# Patient Record
Sex: FEMALE | Race: WHITE | Employment: OTHER
[De-identification: names, ages, dates, MRNs, and addresses within clinical notes are randomized per-mention and may not be internally consistent; named-entity substitution may affect disease eponyms.]

---

## 2023-03-13 ENCOUNTER — OFFICE VISIT (OUTPATIENT)
Dept: INTERNAL MEDICINE CLINIC | Facility: CLINIC | Age: 83
End: 2023-03-13
Payer: COMMERCIAL

## 2023-03-13 VITALS
DIASTOLIC BLOOD PRESSURE: 70 MMHG | BODY MASS INDEX: 39.54 KG/M2 | HEIGHT: 60 IN | SYSTOLIC BLOOD PRESSURE: 130 MMHG | WEIGHT: 201.4 LBS | HEART RATE: 63 BPM

## 2023-03-13 DIAGNOSIS — K21.9 GASTROESOPHAGEAL REFLUX DISEASE, UNSPECIFIED WHETHER ESOPHAGITIS PRESENT: ICD-10-CM

## 2023-03-13 DIAGNOSIS — M81.0 AGE-RELATED OSTEOPOROSIS WITHOUT CURRENT PATHOLOGICAL FRACTURE: ICD-10-CM

## 2023-03-13 DIAGNOSIS — K29.40 CHRONIC EROSIVE GASTRITIS: ICD-10-CM

## 2023-03-13 DIAGNOSIS — G25.81 RLS (RESTLESS LEGS SYNDROME): ICD-10-CM

## 2023-03-13 DIAGNOSIS — I10 HYPERTENSION, ESSENTIAL: ICD-10-CM

## 2023-03-13 DIAGNOSIS — I35.0 AORTIC VALVE STENOSIS, ETIOLOGY OF CARDIAC VALVE DISEASE UNSPECIFIED: ICD-10-CM

## 2023-03-13 DIAGNOSIS — M15.9 PRIMARY OSTEOARTHRITIS INVOLVING MULTIPLE JOINTS: ICD-10-CM

## 2023-03-13 DIAGNOSIS — K63.5 POLYP OF COLON, UNSPECIFIED PART OF COLON, UNSPECIFIED TYPE: ICD-10-CM

## 2023-03-13 DIAGNOSIS — F33.42 RECURRENT MAJOR DEPRESSIVE DISORDER, IN FULL REMISSION (HCC): ICD-10-CM

## 2023-03-13 DIAGNOSIS — J45.51 SEVERE PERSISTENT ASTHMA WITH ACUTE EXACERBATION: Primary | ICD-10-CM

## 2023-03-13 DIAGNOSIS — E66.01 SEVERE OBESITY (HCC): ICD-10-CM

## 2023-03-13 DIAGNOSIS — E03.9 HYPOTHYROIDISM (ACQUIRED): ICD-10-CM

## 2023-03-13 DIAGNOSIS — D50.0 IRON DEFICIENCY ANEMIA DUE TO CHRONIC BLOOD LOSS: ICD-10-CM

## 2023-03-13 PROBLEM — D50.9 IRON DEFICIENCY ANEMIA: Status: ACTIVE | Noted: 2023-03-13

## 2023-03-13 PROBLEM — M19.90 OSTEOARTHRITIS: Status: ACTIVE | Noted: 2023-03-13

## 2023-03-13 PROBLEM — F32.A DEPRESSION: Status: ACTIVE | Noted: 2023-03-13

## 2023-03-13 PROBLEM — M15.0 PRIMARY OSTEOARTHRITIS INVOLVING MULTIPLE JOINTS: Status: ACTIVE | Noted: 2023-03-13

## 2023-03-13 PROBLEM — J45.909 ASTHMA: Status: ACTIVE | Noted: 2023-03-13

## 2023-03-13 PROCEDURE — 99205 OFFICE O/P NEW HI 60 MIN: CPT | Performed by: INTERNAL MEDICINE

## 2023-03-13 PROCEDURE — 3075F SYST BP GE 130 - 139MM HG: CPT | Performed by: INTERNAL MEDICINE

## 2023-03-13 PROCEDURE — 3078F DIAST BP <80 MM HG: CPT | Performed by: INTERNAL MEDICINE

## 2023-03-13 PROCEDURE — 1123F ACP DISCUSS/DSCN MKR DOCD: CPT | Performed by: INTERNAL MEDICINE

## 2023-03-13 RX ORDER — LISINOPRIL AND HYDROCHLOROTHIAZIDE 20; 12.5 MG/1; MG/1
1 TABLET ORAL DAILY
COMMUNITY
End: 2023-03-13 | Stop reason: SDUPTHER

## 2023-03-13 RX ORDER — OMEPRAZOLE 40 MG/1
40 CAPSULE, DELAYED RELEASE ORAL DAILY
Qty: 90 CAPSULE | Refills: 3 | Status: SHIPPED | OUTPATIENT
Start: 2023-03-13

## 2023-03-13 RX ORDER — CITALOPRAM 40 MG/1
40 TABLET ORAL DAILY
COMMUNITY
End: 2023-03-13 | Stop reason: SDUPTHER

## 2023-03-13 RX ORDER — LEVOTHYROXINE SODIUM 137 UG/1
137 TABLET ORAL DAILY
Qty: 90 TABLET | Refills: 1 | Status: SHIPPED | OUTPATIENT
Start: 2023-03-13

## 2023-03-13 RX ORDER — FLUTICASONE PROPIONATE AND SALMETEROL 500; 50 UG/1; UG/1
1 POWDER RESPIRATORY (INHALATION) EVERY 12 HOURS
COMMUNITY
End: 2023-03-13

## 2023-03-13 RX ORDER — LEVOTHYROXINE SODIUM 137 UG/1
137 TABLET ORAL DAILY
COMMUNITY
End: 2023-03-13 | Stop reason: SDUPTHER

## 2023-03-13 RX ORDER — GABAPENTIN 100 MG/1
CAPSULE ORAL
COMMUNITY
Start: 2023-02-22 | End: 2023-03-13

## 2023-03-13 RX ORDER — PREDNISONE 20 MG/1
TABLET ORAL
Qty: 18 TABLET | Refills: 1 | Status: SHIPPED | OUTPATIENT
Start: 2023-03-13

## 2023-03-13 RX ORDER — PREDNISONE 20 MG/1
20 TABLET ORAL DAILY PRN
COMMUNITY
End: 2023-03-13

## 2023-03-13 RX ORDER — SOFT LENS DISINFECTANT
SOLUTION, NON-ORAL MISCELLANEOUS
COMMUNITY

## 2023-03-13 RX ORDER — OMEPRAZOLE 40 MG/1
40 CAPSULE, DELAYED RELEASE ORAL DAILY
COMMUNITY
End: 2023-03-13 | Stop reason: SDUPTHER

## 2023-03-13 RX ORDER — CITALOPRAM 40 MG/1
40 TABLET ORAL DAILY
Qty: 90 TABLET | Refills: 1 | Status: SHIPPED | OUTPATIENT
Start: 2023-03-13

## 2023-03-13 RX ORDER — GABAPENTIN 300 MG/1
CAPSULE ORAL
Qty: 60 CAPSULE | Refills: 2 | Status: SHIPPED | OUTPATIENT
Start: 2023-03-13 | End: 2024-03-13

## 2023-03-13 RX ORDER — PRAMIPEXOLE DIHYDROCHLORIDE 0.75 MG/1
0.75 TABLET ORAL NIGHTLY
COMMUNITY
End: 2023-03-13

## 2023-03-13 RX ORDER — LISINOPRIL AND HYDROCHLOROTHIAZIDE 20; 12.5 MG/1; MG/1
1 TABLET ORAL DAILY
Qty: 90 TABLET | Refills: 1 | Status: SHIPPED | OUTPATIENT
Start: 2023-03-13

## 2023-03-13 SDOH — ECONOMIC STABILITY: INCOME INSECURITY: HOW HARD IS IT FOR YOU TO PAY FOR THE VERY BASICS LIKE FOOD, HOUSING, MEDICAL CARE, AND HEATING?: NOT HARD AT ALL

## 2023-03-13 SDOH — ECONOMIC STABILITY: FOOD INSECURITY: WITHIN THE PAST 12 MONTHS, THE FOOD YOU BOUGHT JUST DIDN'T LAST AND YOU DIDN'T HAVE MONEY TO GET MORE.: NEVER TRUE

## 2023-03-13 SDOH — ECONOMIC STABILITY: HOUSING INSECURITY
IN THE LAST 12 MONTHS, WAS THERE A TIME WHEN YOU DID NOT HAVE A STEADY PLACE TO SLEEP OR SLEPT IN A SHELTER (INCLUDING NOW)?: NO

## 2023-03-13 SDOH — ECONOMIC STABILITY: FOOD INSECURITY: WITHIN THE PAST 12 MONTHS, YOU WORRIED THAT YOUR FOOD WOULD RUN OUT BEFORE YOU GOT MONEY TO BUY MORE.: NEVER TRUE

## 2023-03-13 ASSESSMENT — PATIENT HEALTH QUESTIONNAIRE - PHQ9
SUM OF ALL RESPONSES TO PHQ9 QUESTIONS 1 & 2: 2
SUM OF ALL RESPONSES TO PHQ QUESTIONS 1-9: 2
1. LITTLE INTEREST OR PLEASURE IN DOING THINGS: 0
2. FEELING DOWN, DEPRESSED OR HOPELESS: 2
SUM OF ALL RESPONSES TO PHQ QUESTIONS 1-9: 2

## 2023-03-13 ASSESSMENT — ENCOUNTER SYMPTOMS
VOICE CHANGE: 0
RECTAL PAIN: 0
EYE PAIN: 0
STRIDOR: 0

## 2023-03-13 NOTE — PROGRESS NOTES
FOLLOWUP VISIT    Subjective:    Ms. Emmanuel Bragg is a [de-identified] y.o., female,   Chief Complaint   Patient presents with    [facility]    Asthma       HPI:    Ms. Emmanuel Bragg is a [de-identified] y.o., female who presents today for a new patient appointment. Their previous primary provider was Dr. Van Preston in [place]. Old records will be requested. The patient has severe persistent asthma. Triggers include upper respiratory illnesses and pollen and exposure to dogs and cats. Despite her current Qvar and frequent albuterol she is experiencing wheezing. Her former primary care physician gave her fairly large dose prednisone to use on an as-needed basis tapered over 10 to 14 days. She has not seen an asthma specialist.    She also has had chronic or recurrent iron deficiency anemia felt secondary to chronic GI blood loss due to chronic erosive gastritis. She has had both upper and lower endoscopies performed in [place]. A 2020 colonoscopy revealed 1 small tubular adenoma which was removed. She did not have a small bowel capsule endoscopy. She states she is intolerant of oral iron and instead receives IV iron infusions as needed. Problems as outlined below. The patient has hypertension. The patient has been on an attempted low sodium diet and has been trying to exercise and maintain a healthy weight. The patient reports good compliance with the blood pressure medications. The patient has GERD. The patient has been on attempted therapeutic lifestyle change including smaller more frequent meals and avoiding caffeine. The patient states that the GERD symptoms have been well controlled on current treatment and denies any dysphagia. The patient has hypothyroidism. The patient denies any symptoms of hypo- or hyperthyroidism on the current dose of levothyroxine. Her depression is controlled on current citalopram.      She has restless leg syndrome. Mirapex did not help.   Gabapentin 100 mg at bedtime did not help.      She reports a history of iron deficiency. Cannot tolerate oral iron. Has received IV iron infusions in the past.      Other chronic problems as outlined below. The following portions of the patient's history were reviewed and updated as appropriate:      Past Medical History:   Diagnosis Date    Aortic stenosis     Asthma     Chronic erosive gastritis     Colon polyps     Depression     GERD (gastroesophageal reflux disease)     Hypertension     Hypothyroidism     Iron deficiency anemia     Osteoarthritis     Osteoporosis     RLS (restless legs syndrome)     Severe obesity (HCC)        Past Surgical History:   Procedure Laterality Date    CATARACT EXTRACTION, BILATERAL      CHOLECYSTECTOMY      SHOULDER ARTHROPLASTY      UMBILICAL HERNIA REPAIR         Family History   Problem Relation Age of Onset    Alcohol Abuse Mother     Heart Disease Father        Social History     Socioeconomic History    Marital status:      Spouse name: Not on file    Number of children: Not on file    Years of education: Not on file    Highest education level: Not on file   Occupational History     Comment: retired realtor   Tobacco Use    Smoking status: Former     Packs/day: 0.50     Years: 4.00     Pack years: 2.00     Types: Cigarettes     Quit date: 1965     Years since quittin.2    Smokeless tobacco: Never   Substance and Sexual Activity    Alcohol use: Yes     Comment: occasional social    Drug use: Never    Sexual activity: Not on file   Other Topics Concern    Not on file   Social History Narrative    Not on file     Social Determinants of Health     Financial Resource Strain: Low Risk     Difficulty of Paying Living Expenses: Not hard at all   Food Insecurity: No Food Insecurity    Worried About Running Out of Food in the Last Year: Never true    Ran Out of Food in the Last Year: Never true   Transportation Needs: Unknown    Lack of Transportation (Medical):  Not on file    Lack of Transportation (Non-Medical): No   Physical Activity: Not on file   Stress: Not on file   Social Connections: Not on file   Intimate Partner Violence: Not on file   Housing Stability: Unknown    Unable to Pay for Housing in the Last Year: Not on file    Number of Places Lived in the Last Year: Not on file    Unstable Housing in the Last Year: No       Current Outpatient Medications   Medication Sig Dispense Refill    albuterol sulfate (PROAIR RESPICLICK) 796 (90 Base) MCG/ACT aerosol powder inhalation Inhale 2 puffs into the lungs every 4 hours as needed for Wheezing or Shortness of Breath      beclomethasone (QVAR REDIHALER) 80 MCG/ACT AERB inhaler Inhale 1 puff into the lungs 2 times daily      omeprazole (PRILOSEC) 40 MG delayed release capsule Take 40 mg by mouth daily      citalopram (CELEXA) 40 MG tablet Take 40 mg by mouth daily      lisinopril-hydroCHLOROthiazide (PRINZIDE;ZESTORETIC) 20-12.5 MG per tablet Take 1 tablet by mouth daily      levothyroxine (SYNTHROID) 137 MCG tablet Take 137 mcg by mouth Daily      Respiratory Therapy Supplies (NEBULIZER) HOMER by Does not apply route      Calcium Carbonate-Vitamin D (CALTRATE 600+D PO) Take by mouth 2 times daily                     No current facility-administered medications for this visit. Allergies as of 03/13/2023    (No Known Allergies)       Review of Systems   Constitutional:  Negative for activity change and appetite change. HENT:  Negative for drooling and voice change. Eyes:  Negative for pain. Respiratory:  Negative for stridor. Gastrointestinal:  Negative for rectal pain. Endocrine: Negative for polydipsia and polyphagia. Genitourinary:  Negative for dyspareunia and enuresis. Musculoskeletal:  Negative for gait problem and neck stiffness. Skin:  Negative for pallor. Neurological:  Negative for facial asymmetry and speech difficulty. Hematological:  Does not bruise/bleed easily. Psychiatric/Behavioral:  Negative for self-injury.  The patient is not hyperactive. Patient Care Team:  Liu Mario MD as PCP - General (Internal Medicine)    Objective:    /70 (Site: Left Upper Arm, Position: Sitting)   Pulse 63   Ht 5' (1.524 m)   Wt 201 lb 6.4 oz (91.4 kg)   BMI 39.33 kg/m²     Physical Exam  Vitals reviewed. Constitutional:       General: She is not in acute distress. Appearance: She is not toxic-appearing. HENT:      Head: Normocephalic and atraumatic. Right Ear: Tympanic membrane, ear canal and external ear normal.      Left Ear: Tympanic membrane, ear canal and external ear normal.      Nose: Nose normal.      Mouth/Throat:      Mouth: Mucous membranes are moist.      Pharynx: Oropharynx is clear. Eyes:      General: No scleral icterus. Extraocular Movements: Extraocular movements intact. Pupils: Pupils are equal, round, and reactive to light. Cardiovascular:      Rate and Rhythm: Normal rate and regular rhythm. Pulses: Normal pulses. Heart sounds: Normal heart sounds. Pulmonary:      Effort: Pulmonary effort is normal. No respiratory distress. Breath sounds: No stridor. Comments: Faint diffuse expiratory wheezes present. No dullness or egophony. Abdominal:      General: Abdomen is flat. Bowel sounds are normal.      Palpations: Abdomen is soft. There is no mass. Tenderness: There is no guarding or rebound. Musculoskeletal:         General: Normal range of motion. Cervical back: Normal range of motion and neck supple. Skin:     General: Skin is warm and dry. Coloration: Skin is not jaundiced. Neurological:      Mental Status: She is alert and oriented to person, place, and time. Mental status is at baseline. Psychiatric:         Behavior: Behavior normal.         Thought Content: Thought content normal.            No results found for any previous visit. Assessent & Plan:        1. Severe persistent asthma with acute exacerbation  Overview:   The patient has severe persistent asthma and is currently having a mild exacerbation. Continue Qvar and albuterol. She states her physicians in [place] would administer prednisone starting at 60 mg daily and taper down over 10 to 14 days. We will give her prednisone 60 mg daily x3 days then 40 mg daily x3 days then 20 mg daily x3 days then stop. Will refer to pulmonary. Old records state that she was previously considered a candidate for Nucala but did not obtain due to expense. Orders:  -     predniSONE (DELTASONE) 20 MG tablet; Take 60 mg p.o. daily x3 days then 40 mg p.o. daily x3 days then 20 mg p.o. daily x3 days. , Disp-18 tablet, R-1Normal  -     [facility] Pulmonary and Critical Care  2. Severe obesity ([address]  Overview:  Reviewed the patient's BMI. Discussed the need to lose weight in order to avoid many preventable illnesses. Discussed diet, exercise, and weight loss strategies. 3. RLS (restless legs syndrome)  Overview:  The patient has had restless leg syndrome apparently even despite iron replacement. Mirapex 0.75 mg p.o. nightly as needed did not help. Gabapentin 100 mg p.o. nightly did not help. Will increase dose gabapentin to 300 mg take 1 or 2 tablets daily at bedtime as needed. Orders:  -     gabapentin (NEURONTIN) 300 MG capsule; Take 1 or 2 capsules at bedtime as needed for restless leg syndrome., Disp-60 capsule, R-2Normal  4. Age-related osteoporosis without current pathological fracture  Overview:  Patient was previously treated with various medications over period of years including IV Reclast and possibly Prolia. She is currently on a \"drug holiday. \"  She may continue over-the-counter calcium and vitamin D as well as weightbearing exercise as tolerated and fall avoidance. 5. Primary osteoarthritis involving multiple joints  Overview:  May use over-the-counter Tylenol as needed.   She has been advised to avoid all nonsteroidal anti-inflammatory medications due to her erosive gastritis. 6. Iron deficiency anemia due to chronic blood loss  Overview:  Patient has had chronic/recurrent iron deficiency anemia due to chronic bleeding due to chronic erosive gastritis. She cannot tolerate oral iron. Continue omeprazole and IV iron as needed. Per old records from [place] her previous evaluation included an EGD which confirmed chronic erosive gastritis. Colonoscopy revealed 1 small tubular adenoma which was not felt to be the cause of her iron deficiency. She did not have small bowel capsule endoscopy. Update labs. Orders:  -     CBC with Auto Differential; Future  -     Ferritin; Future  7. Hypothyroidism (acquired)  Overview:  Continue levothyroxine 137 mcg daily. Update labs. Orders:  -     TSH; Future  8. Hypertension, essential  Overview:  Blood pressure is well controlled on current lisinopril HCTZ. We will continue current medication. Orders:  -     Comprehensive Metabolic Panel; Future  9. Gastroesophageal reflux disease, unspecified whether esophagitis present  10. Recurrent major depressive disorder, in full remission ([facility] [address]  Overview:  Symptoms in remission on current citalopram.  She is on a fairly large dose of citalopram and typically experts suggest avoiding doses greater than 20 mg daily in patients older than 65. However she appears to be doing well and tolerating this dose so we will continue for the time being. 11. Polyp of colon, unspecified part of colon, unspecified type  Overview:  2020 colonoscopy reportedly revealed 1 small tubular adenoma which was removed. Given age and severe asthma will likely defer repeat colonoscopy. 12. Chronic erosive gastritis  Overview:  Per old records, prior EGD while living in [place] revealed chronic erosive gastritis. Patient has been maintained on chronic PPI therapy with omeprazole 40 mg daily.   13. Aortic valve stenosis, etiology of cardiac valve disease unspecified  Overview:  3/10/22 echo [facility]) -normal LV size and function with ejection fraction 60 to 65%. Normal RV size and function. Moderate to severe left atrial enlargement. Mild to moderate aortic stenosis. Mild to moderate mitral stenosis. Mild mitral regurgitation. The patient and/or patient representative voiced understanding and agreement with the current diagnoses, recommendations, and possible side effects. Return in about 1 month (around 4/13/2023) for follow up of chronic medical problems, review labs. Spent greater than 60 minutes on this patient's care today.

## 2023-04-27 DIAGNOSIS — J45.51 SEVERE PERSISTENT ASTHMA WITH ACUTE EXACERBATION: Primary | ICD-10-CM

## 2023-06-20 NOTE — PROGRESS NOTES
Name:  Alonzo Tirado  YOB: 1940   MRN: [id number]      Office Visit: 2023        ASSESSMENT AND PLAN:  (Medical Decision Making)    Impression: [de-identified] y.o. female with severe persistent asthma, diagnosed at [facility] in [place] about 20 years ago. Has had worsening asthma symptoms since moving to the [facility] in .      1. Severe persistent asthma without complication  --Spirometry reveals moderately severe obstruction and FeNO is elevated consistent with poorly controlled asthma. This is undertreated. We will stop her Qvar and changed to Trelegy 100, 1 puff once daily followed by mouth care. Patient is given verbal instructions on proper use of prescribed inhaler and is able to give adequate return demonstration. Inhaler education was indicated due to new medication. -- Add Singulair nightly. -- I will give her a prescription for prednisone to keep on hand. She is to call us or message me in my chart to let us know if she has a flareup and has to start this. -- Eosinophil count was elevated at 0.4K/L in . Biologics were recommended in the past at [facility] but at the time these were not covered under her insurance. We may need to consider these in the future if she cannot be controlled with maximum therapy. - Spirometry Without Bronchodilator  - NITRIC OXIDE  GAS DETERMINATION  - DEMO &/OR EVAL,PT USE,AEROSOL DEVICE  - fluticasone-umeclidin-vilant (TRELEGY ELLIPTA) 100-62.5-25 MCG/ACT AEPB inhaler; Inhale 1 puff into the lungs daily  Dispense: 1 each; Refill: 11  - montelukast (SINGULAIR) 10 MG tablet; Take 1 tablet by mouth daily  Dispense: 30 tablet; Refill: 11  - predniSONE (DELTASONE) 20 MG tablet; 2 tabs po qd x 3 days, 1.5 tabs po qd x 3 days, 1 tab po qd x 3 days, 1/2 tab po qd x 3 days. Dispense: 15 tablet; Refill: 0    2. Allergic rhinitis, unspecified seasonality, unspecified trigger  --Had allergy testing years ago while living in [place].

## 2023-06-21 ENCOUNTER — OFFICE VISIT (OUTPATIENT)
Dept: PULMONOLOGY | Age: 83
End: 2023-06-21
Payer: MEDICARE

## 2023-06-21 ENCOUNTER — HOSPITAL ENCOUNTER (OUTPATIENT)
Dept: GENERAL RADIOLOGY | Age: 83
Discharge: HOME | End: 2023-06-24
Payer: MEDICARE

## 2023-06-21 VITALS
BODY MASS INDEX: 39.66 KG/M2 | RESPIRATION RATE: 18 BRPM | DIASTOLIC BLOOD PRESSURE: 80 MMHG | SYSTOLIC BLOOD PRESSURE: 128 MMHG | TEMPERATURE: 97.1 F | HEART RATE: 88 BPM | HEIGHT: 60 IN | OXYGEN SATURATION: 98 % | WEIGHT: 202 LBS

## 2023-06-21 DIAGNOSIS — E66.9 OBESITY, CLASS II, BMI 35-39.9: ICD-10-CM

## 2023-06-21 DIAGNOSIS — J45.51 SEVERE PERSISTENT ASTHMA WITH ACUTE EXACERBATION: ICD-10-CM

## 2023-06-21 DIAGNOSIS — G47.33 OSA (OBSTRUCTIVE SLEEP APNEA): ICD-10-CM

## 2023-06-21 DIAGNOSIS — J30.9 ALLERGIC RHINITIS, UNSPECIFIED SEASONALITY, UNSPECIFIED TRIGGER: ICD-10-CM

## 2023-06-21 DIAGNOSIS — J45.50 SEVERE PERSISTENT ASTHMA WITHOUT COMPLICATION: Primary | ICD-10-CM

## 2023-06-21 LAB
EXPIRATORY TIME: NORMAL
FEF 25-75% %PRED-PRE: NORMAL
FEF 25-75% PRED: NORMAL
FEF 25-75%-PRE: NORMAL
FENO: 57 PPB
FEV1 %PRED-PRE: 53 %
FEV1 PRED: NORMAL
FEV1/FVC %PRED-PRE: NORMAL
FEV1/FVC PRED: NORMAL
FEV1/FVC: 58 %
FEV1: 0.84 L
FVC %PRED-PRE: 68 %
FVC PRED: NORMAL
FVC: 1.44 L
PEF %PRED-PRE: NORMAL
PEF PRED: NORMAL
PEF-PRE: NORMAL

## 2023-06-21 PROCEDURE — G8417 CALC BMI ABV UP PARAM F/U: HCPCS | Performed by: NURSE PRACTITIONER

## 2023-06-21 PROCEDURE — G8427 DOCREV CUR MEDS BY ELIG CLIN: HCPCS | Performed by: NURSE PRACTITIONER

## 2023-06-21 PROCEDURE — 3074F SYST BP LT 130 MM HG: CPT | Performed by: NURSE PRACTITIONER

## 2023-06-21 PROCEDURE — G8400 PT W/DXA NO RESULTS DOC: HCPCS | Performed by: NURSE PRACTITIONER

## 2023-06-21 PROCEDURE — 94664 DEMO&/EVAL PT USE INHALER: CPT | Performed by: NURSE PRACTITIONER

## 2023-06-21 PROCEDURE — 94010 BREATHING CAPACITY TEST: CPT | Performed by: NURSE PRACTITIONER

## 2023-06-21 PROCEDURE — 99205 OFFICE O/P NEW HI 60 MIN: CPT | Performed by: NURSE PRACTITIONER

## 2023-06-21 PROCEDURE — 1123F ACP DISCUSS/DSCN MKR DOCD: CPT | Performed by: NURSE PRACTITIONER

## 2023-06-21 PROCEDURE — 3079F DIAST BP 80-89 MM HG: CPT | Performed by: NURSE PRACTITIONER

## 2023-06-21 PROCEDURE — 1090F PRES/ABSN URINE INCON ASSESS: CPT | Performed by: NURSE PRACTITIONER

## 2023-06-21 PROCEDURE — 1036F TOBACCO NON-USER: CPT | Performed by: NURSE PRACTITIONER

## 2023-06-21 PROCEDURE — 71046 X-RAY EXAM CHEST 2 VIEWS: CPT

## 2023-06-21 RX ORDER — PREDNISONE 20 MG/1
TABLET ORAL
Qty: 15 TABLET | Refills: 0 | Status: SHIPPED | OUTPATIENT
Start: 2023-06-21

## 2023-06-21 RX ORDER — FLUTICASONE FUROATE, UMECLIDINIUM BROMIDE AND VILANTEROL TRIFENATATE 100; 62.5; 25 UG/1; UG/1; UG/1
1 POWDER RESPIRATORY (INHALATION) DAILY
Qty: 1 EACH | Refills: 11 | Status: SHIPPED | OUTPATIENT
Start: 2023-06-21

## 2023-06-21 RX ORDER — ALBUTEROL SULFATE 2.5 MG/3ML
2.5 SOLUTION RESPIRATORY (INHALATION) EVERY 4 HOURS PRN
COMMUNITY

## 2023-06-21 RX ORDER — MONTELUKAST SODIUM 10 MG/1
10 TABLET ORAL DAILY
Qty: 30 TABLET | Refills: 11 | Status: SHIPPED | OUTPATIENT
Start: 2023-06-21

## 2023-06-21 ASSESSMENT — PULMONARY FUNCTION TESTS
FEV1: 0.84
FENO: 57
FEV1/FVC: 58
FVC_PERCENT_PREDICTED_PRE: 68
FEV1_PERCENT_PREDICTED_PRE: 53
FVC: 1.44

## 2023-11-01 NOTE — PROGRESS NOTES
No results found for this or any previous visit. No results found for this or any previous visit. FeNO:   Results for orders placed or performed in visit on 23   NITRIC OXIDE  GAS DETERMINATION   Result Value Ref Range    FeNO 57 ppb     FeNO and Likelihood of Eosinophilic Asthma   Unlikely Intermediate Likely   <25 ppb 25-50 ppb >50ppb     Exercise Oximetry:    Echo: No results found for this or any previous visit from the past 3650 days.       [facility] Reference Info:                                                                                                                  Immunization History   Administered Date(s) Administered    COVID-19, MODERNA BLUE border, Primary or Immunocompromised, (age 12y+), IM, 100 mcg/0.5mL 2021, 2021, 10/31/2021    COVID-19, MODERNA Bivalent, (age 12y+), IM, 50 mcg/0.5 mL 2022    COVID-19, MODERNA Booster BLUE border, (age 18y+), IM, 50mcg/0.25mL 10/31/2021    Influenza Virus Vaccine 2018    Influenza, Naoma Patee (age 72 y+), High Dose, 0.7mL 2010, 2014, 2014, 10/01/2022    Pneumococcal, PCV-13, PREVNAR 15, (age 6w+), IM, 0.5mL 2015    Pneumococcal, PPSV23, PNEUMOVAX 23, (age 2y+), SC/IM, 0.5mL 10/21/2008    TD 5LF, Cleopatra Rudy, (age 7y+), IM, 0.5mL 1996, 2006    TDaP, ADACEL (age 6y-58y), BOOSTRIX (age 10y+), IM, 0.5mL 2016    Zoster Live (Zostavax) 2009     Past Medical History:   Diagnosis Date    Aortic stenosis     Asthma     Chronic erosive gastritis     Colon polyps     Depression     GERD (gastroesophageal reflux disease)     Hypertension     Hypothyroidism     Iron deficiency anemia     Osteoarthritis     Osteoporosis     RLS (restless legs syndrome)     Severe obesity (HCC)         Tobacco Use      Smoking status: Former        Packs/day: 0.50        Years: 4.00        Additional pack years: 0.00        Total pack years: 2.00        Types: Cigarettes        Quit date: 1965

## 2023-11-02 ENCOUNTER — OFFICE VISIT (OUTPATIENT)
Dept: PULMONOLOGY | Age: 83
End: 2023-11-02
Payer: MEDICARE

## 2023-11-02 VITALS
TEMPERATURE: 97.2 F | BODY MASS INDEX: 35.99 KG/M2 | WEIGHT: 183.3 LBS | DIASTOLIC BLOOD PRESSURE: 70 MMHG | RESPIRATION RATE: 19 BRPM | HEART RATE: 70 BPM | OXYGEN SATURATION: 98 % | SYSTOLIC BLOOD PRESSURE: 122 MMHG | HEIGHT: 60 IN

## 2023-11-02 DIAGNOSIS — J45.50 SEVERE PERSISTENT ASTHMA WITHOUT COMPLICATION: Primary | ICD-10-CM

## 2023-11-02 DIAGNOSIS — E66.9 OBESITY, CLASS II, BMI 35-39.9: ICD-10-CM

## 2023-11-02 DIAGNOSIS — G47.33 OSA (OBSTRUCTIVE SLEEP APNEA): ICD-10-CM

## 2023-11-02 DIAGNOSIS — J30.9 ALLERGIC RHINITIS, UNSPECIFIED SEASONALITY, UNSPECIFIED TRIGGER: ICD-10-CM

## 2023-11-02 PROCEDURE — 1123F ACP DISCUSS/DSCN MKR DOCD: CPT | Performed by: NURSE PRACTITIONER

## 2023-11-02 PROCEDURE — G8484 FLU IMMUNIZE NO ADMIN: HCPCS | Performed by: NURSE PRACTITIONER

## 2023-11-02 PROCEDURE — G8427 DOCREV CUR MEDS BY ELIG CLIN: HCPCS | Performed by: NURSE PRACTITIONER

## 2023-11-02 PROCEDURE — 1036F TOBACCO NON-USER: CPT | Performed by: NURSE PRACTITIONER

## 2023-11-02 PROCEDURE — 3078F DIAST BP <80 MM HG: CPT | Performed by: NURSE PRACTITIONER

## 2023-11-02 PROCEDURE — 99214 OFFICE O/P EST MOD 30 MIN: CPT | Performed by: NURSE PRACTITIONER

## 2023-11-02 PROCEDURE — G8400 PT W/DXA NO RESULTS DOC: HCPCS | Performed by: NURSE PRACTITIONER

## 2023-11-02 PROCEDURE — 3074F SYST BP LT 130 MM HG: CPT | Performed by: NURSE PRACTITIONER

## 2023-11-02 PROCEDURE — G8417 CALC BMI ABV UP PARAM F/U: HCPCS | Performed by: NURSE PRACTITIONER

## 2023-11-02 PROCEDURE — 1090F PRES/ABSN URINE INCON ASSESS: CPT | Performed by: NURSE PRACTITIONER

## 2023-11-02 RX ORDER — ALBUTEROL SULFATE 90 UG/1
2 AEROSOL, METERED RESPIRATORY (INHALATION) EVERY 4 HOURS PRN
Qty: 3 EACH | Refills: 3 | Status: SHIPPED | OUTPATIENT
Start: 2023-11-02

## 2023-11-11 ENCOUNTER — APPOINTMENT (OUTPATIENT)
Dept: GENERAL RADIOLOGY | Age: 83
DRG: 189 | End: 2023-11-11
Payer: MEDICARE

## 2023-11-11 ENCOUNTER — HOSPITAL ENCOUNTER (INPATIENT)
Age: 83
LOS: 3 days | Discharge: ANOTHER HOSPITAL | DRG: 189 | End: 2023-11-14
Attending: EMERGENCY MEDICINE | Admitting: FAMILY MEDICINE
Payer: MEDICARE

## 2023-11-11 ENCOUNTER — APPOINTMENT (OUTPATIENT)
Dept: NON INVASIVE DIAGNOSTICS | Age: 83
DRG: 189 | End: 2023-11-11
Attending: FAMILY MEDICINE
Payer: MEDICARE

## 2023-11-11 DIAGNOSIS — R09.02 HYPOXIA: ICD-10-CM

## 2023-11-11 DIAGNOSIS — R06.03 RESPIRATORY DISTRESS: ICD-10-CM

## 2023-11-11 DIAGNOSIS — R79.89 ELEVATED BRAIN NATRIURETIC PEPTIDE (BNP) LEVEL: ICD-10-CM

## 2023-11-11 DIAGNOSIS — J45.901 SEVERE ASTHMA WITH ACUTE EXACERBATION, UNSPECIFIED WHETHER PERSISTENT: Primary | ICD-10-CM

## 2023-11-11 PROBLEM — I10 HYPERTENSION, ESSENTIAL: Chronic | Status: ACTIVE | Noted: 2023-03-13

## 2023-11-11 PROBLEM — J96.01 ACUTE RESPIRATORY FAILURE WITH HYPOXIA (HCC): Status: ACTIVE | Noted: 2023-11-11

## 2023-11-11 PROBLEM — J45.51 SEVERE PERSISTENT ASTHMA WITH ACUTE EXACERBATION: Status: ACTIVE | Noted: 2023-11-11

## 2023-11-11 PROBLEM — E87.21 ACUTE LACTIC ACIDOSIS: Status: ACTIVE | Noted: 2023-11-11

## 2023-11-11 PROBLEM — J45.51 SEVERE PERSISTENT ASTHMA WITH ACUTE EXACERBATION: Status: RESOLVED | Noted: 2023-03-13 | Resolved: 2023-11-11

## 2023-11-11 PROBLEM — E66.812 CLASS 2 SEVERE OBESITY DUE TO EXCESS CALORIES WITH SERIOUS COMORBIDITY AND BODY MASS INDEX (BMI) OF 35.0 TO 35.9 IN ADULT: Chronic | Status: ACTIVE | Noted: 2023-03-13

## 2023-11-11 PROBLEM — E66.01 CLASS 2 SEVERE OBESITY DUE TO EXCESS CALORIES WITH SERIOUS COMORBIDITY AND BODY MASS INDEX (BMI) OF 35.0 TO 35.9 IN ADULT (HCC): Chronic | Status: ACTIVE | Noted: 2023-03-13

## 2023-11-11 PROBLEM — D50.9 IRON DEFICIENCY ANEMIA: Chronic | Status: ACTIVE | Noted: 2023-03-13

## 2023-11-11 LAB
ANION GAP SERPL CALC-SCNC: 8 MMOL/L (ref 2–11)
APPEARANCE UR: CLEAR
B PERT DNA SPEC QL NAA+PROBE: NOT DETECTED
BACTERIA URNS QL MICRO: NEGATIVE /HPF
BASOPHILS # BLD: 0 K/UL (ref 0–0.2)
BASOPHILS NFR BLD: 0 % (ref 0–2)
BILIRUB UR QL: NEGATIVE
BORDETELLA PARAPERTUSSIS BY PCR: NOT DETECTED
BUN SERPL-MCNC: 21 MG/DL (ref 8–23)
C PNEUM DNA SPEC QL NAA+PROBE: NOT DETECTED
CALCIUM SERPL-MCNC: 10.4 MG/DL (ref 8.3–10.4)
CASTS URNS QL MICRO: ABNORMAL /LPF
CHLORIDE SERPL-SCNC: 104 MMOL/L (ref 101–110)
CO2 SERPL-SCNC: 26 MMOL/L (ref 21–32)
COLOR UR: ABNORMAL
CREAT SERPL-MCNC: 0.92 MG/DL (ref 0.6–1)
DIFFERENTIAL METHOD BLD: ABNORMAL
ECHO AO ASC DIAM: 2.5 CM
ECHO AO ASCENDING AORTA INDEX: 1.4 CM/M2
ECHO AO ROOT DIAM: 2.7 CM
ECHO AO ROOT INDEX: 1.52 CM/M2
ECHO AV AREA PEAK VELOCITY: 1.6 CM2
ECHO AV AREA VTI: 1.5 CM2
ECHO AV AREA/BSA PEAK VELOCITY: 0.9 CM2/M2
ECHO AV AREA/BSA VTI: 0.8 CM2/M2
ECHO AV MEAN GRADIENT: 24 MMHG
ECHO AV MEAN VELOCITY: 2.3 M/S
ECHO AV PEAK GRADIENT: 46 MMHG
ECHO AV PEAK VELOCITY: 3.4 M/S
ECHO AV VELOCITY RATIO: 0.65
ECHO AV VTI: 64.7 CM
ECHO BSA: 1.86 M2
ECHO EST RA PRESSURE: 8 MMHG
ECHO IVC EXP: 2.5 CM
ECHO LA AREA 2C: 14.8 CM2
ECHO LA AREA 4C: 22.7 CM2
ECHO LA DIAMETER INDEX: 2.47 CM/M2
ECHO LA DIAMETER: 4.4 CM
ECHO LA MAJOR AXIS: 6.2 CM
ECHO LA MINOR AXIS: 5.7 CM
ECHO LA TO AORTIC ROOT RATIO: 1.63
ECHO LA VOL BP: 48 ML (ref 22–52)
ECHO LA VOL MOD A2C: 32 ML (ref 22–52)
ECHO LA VOL MOD A4C: 67 ML (ref 22–52)
ECHO LA VOL/BSA BIPLANE: 27 ML/M2 (ref 16–34)
ECHO LA VOLUME INDEX MOD A2C: 18 ML/M2 (ref 16–34)
ECHO LA VOLUME INDEX MOD A4C: 38 ML/M2 (ref 16–34)
ECHO LV E' LATERAL VELOCITY: 9 CM/S
ECHO LV E' SEPTAL VELOCITY: 7 CM/S
ECHO LV EDV A2C: 88 ML
ECHO LV EDV A4C: 58 ML
ECHO LV EDV INDEX A4C: 33 ML/M2
ECHO LV EDV NDEX A2C: 49 ML/M2
ECHO LV EJECTION FRACTION A2C: 76 %
ECHO LV EJECTION FRACTION A4C: 66 %
ECHO LV EJECTION FRACTION BIPLANE: 73 % (ref 55–100)
ECHO LV ESV A2C: 21 ML
ECHO LV ESV A4C: 20 ML
ECHO LV ESV INDEX A2C: 12 ML/M2
ECHO LV ESV INDEX A4C: 11 ML/M2
ECHO LV FRACTIONAL SHORTENING: 49 % (ref 28–44)
ECHO LV INTERNAL DIMENSION DIASTOLE INDEX: 2.3 CM/M2
ECHO LV INTERNAL DIMENSION DIASTOLIC: 4.1 CM (ref 3.9–5.3)
ECHO LV INTERNAL DIMENSION SYSTOLIC INDEX: 1.18 CM/M2
ECHO LV INTERNAL DIMENSION SYSTOLIC: 2.1 CM
ECHO LV IVSD: 1.1 CM (ref 0.6–0.9)
ECHO LV MASS 2D: 132.1 G (ref 67–162)
ECHO LV MASS INDEX 2D: 74.2 G/M2 (ref 43–95)
ECHO LV POSTERIOR WALL DIASTOLIC: 0.9 CM (ref 0.6–0.9)
ECHO LV RELATIVE WALL THICKNESS RATIO: 0.44
ECHO LVOT AREA: 2.5 CM2
ECHO LVOT AV VTI INDEX: 0.58
ECHO LVOT DIAM: 1.8 CM
ECHO LVOT MEAN GRADIENT: 11 MMHG
ECHO LVOT PEAK GRADIENT: 19 MMHG
ECHO LVOT PEAK VELOCITY: 2.2 M/S
ECHO LVOT STROKE VOLUME INDEX: 53.6 ML/M2
ECHO LVOT SV: 95.4 ML
ECHO LVOT VTI: 37.5 CM
ECHO MV A VELOCITY: 2.26 M/S
ECHO MV AREA VTI: 1.5 CM2
ECHO MV E DECELERATION TIME (DT): 544 MS
ECHO MV E VELOCITY: 1.36 M/S
ECHO MV E/A RATIO: 0.6
ECHO MV E/E' LATERAL: 15.11
ECHO MV LVOT VTI INDEX: 1.74
ECHO MV MAX VELOCITY: 2.3 M/S
ECHO MV MEAN GRADIENT: 10 MMHG
ECHO MV MEAN VELOCITY: 1.5 M/S
ECHO MV PEAK GRADIENT: 22 MMHG
ECHO MV VTI: 65.1 CM
ECHO PV ACCELERATION TIME (AT): 106 MS
ECHO PV MAX VELOCITY: 1.8 M/S
ECHO PV PEAK GRADIENT: 13 MMHG
ECHO RIGHT VENTRICULAR SYSTOLIC PRESSURE (RVSP): 51 MMHG
ECHO RV BASAL DIMENSION: 4.3 CM
ECHO RV FREE WALL PEAK S': 18 CM/S
ECHO RV TAPSE: 2.7 CM (ref 1.7–?)
ECHO TV REGURGITANT MAX VELOCITY: 3.28 M/S
ECHO TV REGURGITANT PEAK GRADIENT: 43 MMHG
EOSINOPHIL # BLD: 0 K/UL (ref 0–0.8)
EOSINOPHIL NFR BLD: 0 % (ref 0.5–7.8)
EPI CELLS #/AREA URNS HPF: ABNORMAL /HPF
ERYTHROCYTE [DISTWIDTH] IN BLOOD BY AUTOMATED COUNT: 15.2 % (ref 11.9–14.6)
FLUAV SUBTYP SPEC NAA+PROBE: NOT DETECTED
FLUBV RNA SPEC QL NAA+PROBE: NOT DETECTED
GLUCOSE SERPL-MCNC: 143 MG/DL (ref 65–100)
GLUCOSE UR STRIP.AUTO-MCNC: NEGATIVE MG/DL
HADV DNA SPEC QL NAA+PROBE: NOT DETECTED
HCOV 229E RNA SPEC QL NAA+PROBE: NOT DETECTED
HCOV HKU1 RNA SPEC QL NAA+PROBE: NOT DETECTED
HCOV NL63 RNA SPEC QL NAA+PROBE: NOT DETECTED
HCOV OC43 RNA SPEC QL NAA+PROBE: NOT DETECTED
HCT VFR BLD AUTO: 46.8 % (ref 35.8–46.3)
HGB BLD-MCNC: 14.9 G/DL (ref 11.7–15.4)
HGB UR QL STRIP: NEGATIVE
HMPV RNA SPEC QL NAA+PROBE: NOT DETECTED
HPIV1 RNA SPEC QL NAA+PROBE: NOT DETECTED
HPIV2 RNA SPEC QL NAA+PROBE: NOT DETECTED
HPIV3 RNA SPEC QL NAA+PROBE: NOT DETECTED
HPIV4 RNA SPEC QL NAA+PROBE: NOT DETECTED
IMM GRANULOCYTES # BLD AUTO: 0.1 K/UL (ref 0–0.5)
IMM GRANULOCYTES NFR BLD AUTO: 1 % (ref 0–5)
KETONES UR QL STRIP.AUTO: NEGATIVE MG/DL
LACTATE SERPL-SCNC: 0.9 MMOL/L (ref 0.4–2)
LACTATE SERPL-SCNC: 2.4 MMOL/L (ref 0.4–2)
LACTATE SERPL-SCNC: 3.1 MMOL/L (ref 0.4–2)
LACTATE SERPL-SCNC: 3.8 MMOL/L (ref 0.4–2)
LACTATE SERPL-SCNC: 4 MMOL/L (ref 0.4–2)
LEUKOCYTE ESTERASE UR QL STRIP.AUTO: ABNORMAL
LYMPHOCYTES # BLD: 2.1 K/UL (ref 0.5–4.6)
LYMPHOCYTES NFR BLD: 18 % (ref 13–44)
M PNEUMO DNA SPEC QL NAA+PROBE: NOT DETECTED
MAGNESIUM SERPL-MCNC: 2.2 MG/DL (ref 1.8–2.4)
MCH RBC QN AUTO: 30.7 PG (ref 26.1–32.9)
MCHC RBC AUTO-ENTMCNC: 31.8 G/DL (ref 31.4–35)
MCV RBC AUTO: 96.3 FL (ref 82–102)
MONOCYTES # BLD: 1.2 K/UL (ref 0.1–1.3)
MONOCYTES NFR BLD: 10 % (ref 4–12)
NEUTS SEG # BLD: 8.3 K/UL (ref 1.7–8.2)
NEUTS SEG NFR BLD: 71 % (ref 43–78)
NITRITE UR QL STRIP.AUTO: NEGATIVE
NRBC # BLD: 0 K/UL (ref 0–0.2)
NT PRO BNP: 944 PG/ML
PH UR STRIP: 6.5 (ref 5–9)
PLATELET # BLD AUTO: 280 K/UL (ref 150–450)
PMV BLD AUTO: 10.3 FL (ref 9.4–12.3)
POTASSIUM SERPL-SCNC: 3.9 MMOL/L (ref 3.5–5.1)
PROT UR STRIP-MCNC: NEGATIVE MG/DL
RBC # BLD AUTO: 4.86 M/UL (ref 4.05–5.2)
RBC #/AREA URNS HPF: ABNORMAL /HPF
RSV RNA SPEC QL NAA+PROBE: NOT DETECTED
RV+EV RNA SPEC QL NAA+PROBE: NOT DETECTED
SARS-COV-2 RNA RESP QL NAA+PROBE: NOT DETECTED
SODIUM SERPL-SCNC: 138 MMOL/L (ref 133–143)
SP GR UR REFRACTOMETRY: 1.02 (ref 1–1.02)
TROPONIN I SERPL HS-MCNC: 11.1 PG/ML (ref 0–14)
UROBILINOGEN UR QL STRIP.AUTO: 0.2 EU/DL (ref 0.2–1)
WBC # BLD AUTO: 11.8 K/UL (ref 4.3–11.1)
WBC URNS QL MICRO: ABNORMAL /HPF

## 2023-11-11 PROCEDURE — 2500000003 HC RX 250 WO HCPCS: Performed by: FAMILY MEDICINE

## 2023-11-11 PROCEDURE — 2700000000 HC OXYGEN THERAPY PER DAY

## 2023-11-11 PROCEDURE — 6360000002 HC RX W HCPCS: Performed by: EMERGENCY MEDICINE

## 2023-11-11 PROCEDURE — 94645 CONT INHLJ TX EACH ADDL HOUR: CPT

## 2023-11-11 PROCEDURE — 94644 CONT INHLJ TX 1ST HOUR: CPT

## 2023-11-11 PROCEDURE — 81001 URINALYSIS AUTO W/SCOPE: CPT

## 2023-11-11 PROCEDURE — 87040 BLOOD CULTURE FOR BACTERIA: CPT

## 2023-11-11 PROCEDURE — 87899 AGENT NOS ASSAY W/OPTIC: CPT

## 2023-11-11 PROCEDURE — 83880 ASSAY OF NATRIURETIC PEPTIDE: CPT

## 2023-11-11 PROCEDURE — 6360000002 HC RX W HCPCS: Performed by: FAMILY MEDICINE

## 2023-11-11 PROCEDURE — 97535 SELF CARE MNGMENT TRAINING: CPT

## 2023-11-11 PROCEDURE — 93306 TTE W/DOPPLER COMPLETE: CPT

## 2023-11-11 PROCEDURE — 84484 ASSAY OF TROPONIN QUANT: CPT

## 2023-11-11 PROCEDURE — 83605 ASSAY OF LACTIC ACID: CPT

## 2023-11-11 PROCEDURE — 96365 THER/PROPH/DIAG IV INF INIT: CPT

## 2023-11-11 PROCEDURE — 71045 X-RAY EXAM CHEST 1 VIEW: CPT

## 2023-11-11 PROCEDURE — 6360000002 HC RX W HCPCS

## 2023-11-11 PROCEDURE — 6370000000 HC RX 637 (ALT 250 FOR IP): Performed by: FAMILY MEDICINE

## 2023-11-11 PROCEDURE — 85025 COMPLETE CBC W/AUTO DIFF WBC: CPT

## 2023-11-11 PROCEDURE — 94762 N-INVAS EAR/PLS OXIMTRY CONT: CPT

## 2023-11-11 PROCEDURE — 97161 PT EVAL LOW COMPLEX 20 MIN: CPT

## 2023-11-11 PROCEDURE — 2000000000 HC ICU R&B

## 2023-11-11 PROCEDURE — 2580000003 HC RX 258: Performed by: FAMILY MEDICINE

## 2023-11-11 PROCEDURE — 0202U NFCT DS 22 TRGT SARS-COV-2: CPT

## 2023-11-11 PROCEDURE — 97165 OT EVAL LOW COMPLEX 30 MIN: CPT

## 2023-11-11 PROCEDURE — 6370000000 HC RX 637 (ALT 250 FOR IP): Performed by: EMERGENCY MEDICINE

## 2023-11-11 PROCEDURE — 83735 ASSAY OF MAGNESIUM: CPT

## 2023-11-11 PROCEDURE — 93306 TTE W/DOPPLER COMPLETE: CPT | Performed by: INTERNAL MEDICINE

## 2023-11-11 PROCEDURE — 99285 EMERGENCY DEPT VISIT HI MDM: CPT

## 2023-11-11 PROCEDURE — 80048 BASIC METABOLIC PNL TOTAL CA: CPT

## 2023-11-11 PROCEDURE — 94760 N-INVAS EAR/PLS OXIMETRY 1: CPT

## 2023-11-11 PROCEDURE — 86738 MYCOPLASMA ANTIBODY: CPT

## 2023-11-11 PROCEDURE — 94761 N-INVAS EAR/PLS OXIMETRY MLT: CPT

## 2023-11-11 PROCEDURE — 2580000003 HC RX 258: Performed by: EMERGENCY MEDICINE

## 2023-11-11 PROCEDURE — 94640 AIRWAY INHALATION TREATMENT: CPT

## 2023-11-11 PROCEDURE — 87449 NOS EACH ORGANISM AG IA: CPT

## 2023-11-11 PROCEDURE — 97530 THERAPEUTIC ACTIVITIES: CPT

## 2023-11-11 PROCEDURE — 93005 ELECTROCARDIOGRAM TRACING: CPT | Performed by: EMERGENCY MEDICINE

## 2023-11-11 RX ORDER — SODIUM CHLORIDE 9 MG/ML
INJECTION, SOLUTION INTRAVENOUS PRN
Status: DISCONTINUED | OUTPATIENT
Start: 2023-11-11 | End: 2023-11-14 | Stop reason: HOSPADM

## 2023-11-11 RX ORDER — ALBUTEROL SULFATE 2.5 MG/3ML
10 SOLUTION RESPIRATORY (INHALATION) ONCE
Status: COMPLETED | OUTPATIENT
Start: 2023-11-11 | End: 2023-11-11

## 2023-11-11 RX ORDER — ALBUTEROL SULFATE 2.5 MG/3ML
SOLUTION RESPIRATORY (INHALATION)
Status: COMPLETED
Start: 2023-11-11 | End: 2023-11-11

## 2023-11-11 RX ORDER — SODIUM CHLORIDE, SODIUM LACTATE, POTASSIUM CHLORIDE, AND CALCIUM CHLORIDE .6; .31; .03; .02 G/100ML; G/100ML; G/100ML; G/100ML
250 INJECTION, SOLUTION INTRAVENOUS ONCE
Status: COMPLETED | OUTPATIENT
Start: 2023-11-11 | End: 2023-11-11

## 2023-11-11 RX ORDER — ONDANSETRON 4 MG/1
4 TABLET, ORALLY DISINTEGRATING ORAL EVERY 8 HOURS PRN
Status: DISCONTINUED | OUTPATIENT
Start: 2023-11-11 | End: 2023-11-14 | Stop reason: HOSPADM

## 2023-11-11 RX ORDER — ENOXAPARIN SODIUM 100 MG/ML
40 INJECTION SUBCUTANEOUS DAILY
Status: DISCONTINUED | OUTPATIENT
Start: 2023-11-11 | End: 2023-11-12

## 2023-11-11 RX ORDER — PREDNISONE 20 MG/1
40 TABLET ORAL DAILY
Status: DISCONTINUED | OUTPATIENT
Start: 2023-11-13 | End: 2023-11-13

## 2023-11-11 RX ORDER — DILTIAZEM HYDROCHLORIDE 5 MG/ML
10 INJECTION INTRAVENOUS ONCE
Status: DISCONTINUED | OUTPATIENT
Start: 2023-11-11 | End: 2023-11-11

## 2023-11-11 RX ORDER — LANOLIN ALCOHOL/MO/W.PET/CERES
3 CREAM (GRAM) TOPICAL NIGHTLY
Status: DISCONTINUED | OUTPATIENT
Start: 2023-11-11 | End: 2023-11-14 | Stop reason: HOSPADM

## 2023-11-11 RX ORDER — ALBUTEROL SULFATE 2.5 MG/3ML
2.5 SOLUTION RESPIRATORY (INHALATION)
Status: DISCONTINUED | OUTPATIENT
Start: 2023-11-11 | End: 2023-11-14 | Stop reason: HOSPADM

## 2023-11-11 RX ORDER — ACETAMINOPHEN 325 MG/1
650 TABLET ORAL EVERY 6 HOURS PRN
Status: DISCONTINUED | OUTPATIENT
Start: 2023-11-11 | End: 2023-11-14 | Stop reason: HOSPADM

## 2023-11-11 RX ORDER — GABAPENTIN 400 MG/1
400 CAPSULE ORAL NIGHTLY
Status: DISCONTINUED | OUTPATIENT
Start: 2023-11-11 | End: 2023-11-14 | Stop reason: HOSPADM

## 2023-11-11 RX ORDER — LEVALBUTEROL INHALATION SOLUTION 0.63 MG/3ML
0.63 SOLUTION RESPIRATORY (INHALATION) EVERY 4 HOURS PRN
Status: DISCONTINUED | OUTPATIENT
Start: 2023-11-11 | End: 2023-11-12

## 2023-11-11 RX ORDER — SODIUM CHLORIDE 0.9 % (FLUSH) 0.9 %
5-40 SYRINGE (ML) INJECTION EVERY 12 HOURS SCHEDULED
Status: DISCONTINUED | OUTPATIENT
Start: 2023-11-11 | End: 2023-11-14 | Stop reason: HOSPADM

## 2023-11-11 RX ORDER — 0.9 % SODIUM CHLORIDE 0.9 %
1000 INTRAVENOUS SOLUTION INTRAVENOUS ONCE
Status: COMPLETED | OUTPATIENT
Start: 2023-11-11 | End: 2023-11-11

## 2023-11-11 RX ORDER — FLUTICASONE PROPIONATE 110 UG/1
2 AEROSOL, METERED RESPIRATORY (INHALATION)
Status: DISCONTINUED | OUTPATIENT
Start: 2023-11-11 | End: 2023-11-11 | Stop reason: ALTCHOICE

## 2023-11-11 RX ORDER — SODIUM CHLORIDE, SODIUM LACTATE, POTASSIUM CHLORIDE, CALCIUM CHLORIDE 600; 310; 30; 20 MG/100ML; MG/100ML; MG/100ML; MG/100ML
INJECTION, SOLUTION INTRAVENOUS CONTINUOUS
Status: DISCONTINUED | OUTPATIENT
Start: 2023-11-11 | End: 2023-11-12

## 2023-11-11 RX ORDER — TRAZODONE HYDROCHLORIDE 50 MG/1
50 TABLET ORAL NIGHTLY
Status: DISCONTINUED | OUTPATIENT
Start: 2023-11-11 | End: 2023-11-14 | Stop reason: HOSPADM

## 2023-11-11 RX ORDER — POLYETHYLENE GLYCOL 3350 17 G/17G
17 POWDER, FOR SOLUTION ORAL DAILY PRN
Status: DISCONTINUED | OUTPATIENT
Start: 2023-11-11 | End: 2023-11-14 | Stop reason: HOSPADM

## 2023-11-11 RX ORDER — ALBUTEROL SULFATE 2.5 MG/3ML
2.5 SOLUTION RESPIRATORY (INHALATION)
Status: DISCONTINUED | OUTPATIENT
Start: 2023-11-11 | End: 2023-11-11

## 2023-11-11 RX ORDER — SODIUM CHLORIDE 0.9 % (FLUSH) 0.9 %
5-40 SYRINGE (ML) INJECTION PRN
Status: DISCONTINUED | OUTPATIENT
Start: 2023-11-11 | End: 2023-11-14 | Stop reason: HOSPADM

## 2023-11-11 RX ORDER — BUDESONIDE AND FORMOTEROL FUMARATE DIHYDRATE 160; 4.5 UG/1; UG/1
2 AEROSOL RESPIRATORY (INHALATION)
Status: DISCONTINUED | OUTPATIENT
Start: 2023-11-11 | End: 2023-11-11 | Stop reason: ALTCHOICE

## 2023-11-11 RX ORDER — ONDANSETRON 2 MG/ML
4 INJECTION INTRAMUSCULAR; INTRAVENOUS EVERY 6 HOURS PRN
Status: DISCONTINUED | OUTPATIENT
Start: 2023-11-11 | End: 2023-11-14 | Stop reason: HOSPADM

## 2023-11-11 RX ORDER — ACETAMINOPHEN 650 MG/1
650 SUPPOSITORY RECTAL EVERY 6 HOURS PRN
Status: DISCONTINUED | OUTPATIENT
Start: 2023-11-11 | End: 2023-11-14 | Stop reason: HOSPADM

## 2023-11-11 RX ORDER — DILTIAZEM HYDROCHLORIDE 5 MG/ML
INJECTION INTRAVENOUS
Status: DISCONTINUED
Start: 2023-11-11 | End: 2023-11-11 | Stop reason: WASHOUT

## 2023-11-11 RX ORDER — MAGNESIUM SULFATE IN WATER 40 MG/ML
2000 INJECTION, SOLUTION INTRAVENOUS ONCE
Status: COMPLETED | OUTPATIENT
Start: 2023-11-11 | End: 2023-11-11

## 2023-11-11 RX ORDER — IPRATROPIUM BROMIDE AND ALBUTEROL SULFATE 2.5; .5 MG/3ML; MG/3ML
1 SOLUTION RESPIRATORY (INHALATION)
Status: COMPLETED | OUTPATIENT
Start: 2023-11-11 | End: 2023-11-11

## 2023-11-11 RX ORDER — GABAPENTIN 300 MG/1
900 CAPSULE ORAL NIGHTLY
Status: DISCONTINUED | OUTPATIENT
Start: 2023-11-11 | End: 2023-11-11

## 2023-11-11 RX ADMIN — ENOXAPARIN SODIUM 40 MG: 100 INJECTION SUBCUTANEOUS at 08:45

## 2023-11-11 RX ADMIN — LEVALBUTEROL HYDROCHLORIDE 0.63 MG: 0.63 SOLUTION RESPIRATORY (INHALATION) at 19:55

## 2023-11-11 RX ADMIN — SODIUM CHLORIDE, POTASSIUM CHLORIDE, SODIUM LACTATE AND CALCIUM CHLORIDE: 600; 310; 30; 20 INJECTION, SOLUTION INTRAVENOUS at 22:07

## 2023-11-11 RX ADMIN — WATER 40 MG: 1 INJECTION INTRAMUSCULAR; INTRAVENOUS; SUBCUTANEOUS at 20:26

## 2023-11-11 RX ADMIN — TUBERCULIN PURIFIED PROTEIN DERIVATIVE 5 UNITS: 5 INJECTION, SOLUTION INTRADERMAL at 08:45

## 2023-11-11 RX ADMIN — SODIUM CHLORIDE, PRESERVATIVE FREE 10 ML: 5 INJECTION INTRAVENOUS at 08:48

## 2023-11-11 RX ADMIN — Medication 3 MG: at 19:16

## 2023-11-11 RX ADMIN — ALBUTEROL SULFATE 10 MG: 2.5 SOLUTION RESPIRATORY (INHALATION) at 04:14

## 2023-11-11 RX ADMIN — ACETAMINOPHEN 650 MG: 325 TABLET, FILM COATED ORAL at 19:16

## 2023-11-11 RX ADMIN — IPRATROPIUM BROMIDE AND ALBUTEROL SULFATE 1 DOSE: .5; 3 SOLUTION RESPIRATORY (INHALATION) at 04:03

## 2023-11-11 RX ADMIN — ALBUTEROL SULFATE 2.5 MG: 2.5 SOLUTION RESPIRATORY (INHALATION) at 12:50

## 2023-11-11 RX ADMIN — GABAPENTIN 400 MG: 400 CAPSULE ORAL at 20:30

## 2023-11-11 RX ADMIN — WATER 40 MG: 1 INJECTION INTRAMUSCULAR; INTRAVENOUS; SUBCUTANEOUS at 16:13

## 2023-11-11 RX ADMIN — SODIUM BICARBONATE: 84 INJECTION, SOLUTION INTRAVENOUS at 06:04

## 2023-11-11 RX ADMIN — SODIUM CHLORIDE, POTASSIUM CHLORIDE, SODIUM LACTATE AND CALCIUM CHLORIDE 250 ML: 600; 310; 30; 20 INJECTION, SOLUTION INTRAVENOUS at 12:48

## 2023-11-11 RX ADMIN — LEVOTHYROXINE SODIUM 137 MCG: 0.11 TABLET ORAL at 08:37

## 2023-11-11 RX ADMIN — MAGNESIUM SULFATE HEPTAHYDRATE 2000 MG: 40 INJECTION, SOLUTION INTRAVENOUS at 04:01

## 2023-11-11 RX ADMIN — SODIUM CHLORIDE, PRESERVATIVE FREE 10 ML: 5 INJECTION INTRAVENOUS at 19:31

## 2023-11-11 RX ADMIN — WATER 40 MG: 1 INJECTION INTRAMUSCULAR; INTRAVENOUS; SUBCUTANEOUS at 08:41

## 2023-11-11 RX ADMIN — CEFTRIAXONE 1000 MG: 1 INJECTION, POWDER, FOR SOLUTION INTRAMUSCULAR; INTRAVENOUS at 05:26

## 2023-11-11 RX ADMIN — ACETAMINOPHEN 650 MG: 325 TABLET, FILM COATED ORAL at 12:53

## 2023-11-11 RX ADMIN — SODIUM CHLORIDE, POTASSIUM CHLORIDE, SODIUM LACTATE AND CALCIUM CHLORIDE: 600; 310; 30; 20 INJECTION, SOLUTION INTRAVENOUS at 11:11

## 2023-11-11 RX ADMIN — TRAZODONE HYDROCHLORIDE 50 MG: 50 TABLET ORAL at 20:27

## 2023-11-11 RX ADMIN — ALBUTEROL SULFATE 10 MG/HR: 2.5 SOLUTION RESPIRATORY (INHALATION) at 09:37

## 2023-11-11 RX ADMIN — SODIUM CHLORIDE 1000 ML: 9 INJECTION, SOLUTION INTRAVENOUS at 05:25

## 2023-11-11 ASSESSMENT — PAIN DESCRIPTION - DESCRIPTORS
DESCRIPTORS: ACHING
DESCRIPTORS: ACHING

## 2023-11-11 ASSESSMENT — ENCOUNTER SYMPTOMS
SHORTNESS OF BREATH: 1
GASTROINTESTINAL NEGATIVE: 1
BACK PAIN: 0
COUGH: 1

## 2023-11-11 ASSESSMENT — PAIN SCALES - GENERAL
PAINLEVEL_OUTOF10: 0
PAINLEVEL_OUTOF10: 3
PAINLEVEL_OUTOF10: 0
PAINLEVEL_OUTOF10: 0
PAINLEVEL_OUTOF10: 3
PAINLEVEL_OUTOF10: 1

## 2023-11-11 ASSESSMENT — LIFESTYLE VARIABLES
HOW OFTEN DO YOU HAVE A DRINK CONTAINING ALCOHOL: NEVER
HOW MANY STANDARD DRINKS CONTAINING ALCOHOL DO YOU HAVE ON A TYPICAL DAY: PATIENT DOES NOT DRINK

## 2023-11-11 ASSESSMENT — PAIN - FUNCTIONAL ASSESSMENT: PAIN_FUNCTIONAL_ASSESSMENT: ACTIVITIES ARE NOT PREVENTED

## 2023-11-11 ASSESSMENT — PAIN DESCRIPTION - LOCATION
LOCATION: JAW
LOCATION: HEAD

## 2023-11-11 ASSESSMENT — PAIN DESCRIPTION - ORIENTATION: ORIENTATION: RIGHT;LEFT;LOWER

## 2023-11-11 NOTE — ED NOTES
Per Dr. Cheyenne Deras Respiratory panel to be cancelled.  Called Jack Cardenas in Lab to confirm cancellation      Tunde Don RN  11/11/23 2217

## 2023-11-11 NOTE — PROGRESS NOTES
Echocardiogram showed right atrial mass, unclear etiology and moderate to severe mitral stenosis. Discussed with cardiology who recommend cardiac MRI for better evaluation for mass. Recommend outpatient follow-up for MS monitoring. Cardiac MRI ordered.

## 2023-11-11 NOTE — ED PROVIDER NOTES
Emergency Department Provider Note       PCP: Yarely Vo MD   Age: 82 y.o. Sex: female     [address]    1. Severe asthma with acute exacerbation, unspecified whether persistent  J45.901       2. Hypoxia  R09.02           Medical Decision Making     Complexity of Problems Addressed:  1 or more chronic illnesses with a severe exacerbation or progression. 1 or more acute illnesses that pose a threat to life or bodily function. Data Reviewed and Analyzed:   I independently ordered and reviewed each unique test.  I reviewed external records: provider visit note from PCP. The patients assessment required an independent historian: EMS. The reason they were needed is important historical information not provided by the patient. I independently ordered and interpreted the ED EKG in the absence of a Cardiologist.    See below       I interpreted the X-rays chest x-ray without consolidation, infiltrate, pulmonary edema. I interpreted the labs. Discussion of management or test interpretation. On 4 L nasal cannula. Still have significant respiratory distress. We will given another dose of DuoNeb. Need to consider Airvo for additional positive pressure ventilation. We will give 2 g of IV magnesium sulfate over 20 minutes. Will likely need 1 hour continuous albuterol after. She is on 4 L now and is satting between 94 to 96%. Does not exam fluid overload. Will obtain labs, chest x-ray. We will also obtain respiratory viral panel for assessment. 5:10 AM  She is appear better. However still require supplemental oxygen. Appear much better on heated high flow nasal cannula. No signs of pneumonia.  however no other signs of CHF. Has history of aortic stenosis. Possibly contributing to elevated BNP.   Given her overall condition, hypoxia, shortness of breath secondary to severe asthma requiring significant treatment and I have discussed admission with the hospitalist hours as needed for Wheezing    BECLOMETHASONE (QVAR REDIHALER) 80 MCG/ACT AERB INHALER    Inhale 1 puff into the lungs in the morning and 1 puff in the evening. CALCIUM CARBONATE-VITAMIN D (CALTRATE 600+D PO)    Take by mouth 2 times daily    CITALOPRAM (CELEXA) 40 MG TABLET    Take 1 tablet by mouth daily    GABAPENTIN (NEURONTIN) 300 MG CAPSULE    Take 1 or 2 capsules at bedtime as needed for restless leg syndrome. LEVOTHYROXINE (SYNTHROID) 137 MCG TABLET    Take 1 tablet by mouth Daily    LISINOPRIL-HYDROCHLOROTHIAZIDE (PRINZIDE;ZESTORETIC) 20-12.5 MG PER TABLET    Take 1 tablet by mouth daily    MONTELUKAST (SINGULAIR) 10 MG TABLET    Take 1 tablet by mouth daily    OMEPRAZOLE (PRILOSEC) 40 MG DELAYED RELEASE CAPSULE    Take 1 capsule by mouth daily    RESPIRATORY THERAPY SUPPLIES (NEBULIZER) HOMER    by Does not apply route        No results found for any visits on 11/11/23. XR CHEST PORTABLE    (Results Pending)                     Voice dictation software was used during the making of this note. This software is not perfect and grammatical and other typographical errors may be present. This note has not been completely proofread for errors.      Lucas Burt MD  11/11/23 0242       Lucas Burt MD  11/11/23 [address] [facility], [address] Diego Bailey MD  11/11/23 7083

## 2023-11-11 NOTE — PLAN OF CARE
Problem: Discharge Planning  Goal: Discharge to home or other facility with appropriate resources  Recent Flowsheet Documentation  Taken 11/11/2023 0800 by Israel Haskins RN  Discharge to home or other facility with appropriate resources:   Identify barriers to discharge with patient and caregiver   Arrange for needed discharge resources and transportation as appropriate   Identify discharge learning needs (meds, wound care, etc)   Refer to discharge planning if patient needs post-hospital services based on physician order or complex needs related to functional status, cognitive ability or social support system     Problem: Pain  Goal: Verbalizes/displays adequate comfort level or baseline comfort level  Recent Flowsheet Documentation  Taken 11/11/2023 0749 by Israel Haskins RN  Verbalizes/displays adequate comfort level or baseline comfort level:   Encourage patient to monitor pain and request assistance   Assess pain using appropriate pain scale   Administer analgesics based on type and severity of pain and evaluate response   Implement non-pharmacological measures as appropriate and evaluate response   Consider cultural and social influences on pain and pain management   Notify Licensed Independent Practitioner if interventions unsuccessful or patient reports new pain

## 2023-11-11 NOTE — THERAPY EVALUATION
Care  Education Method: Verbal  Barriers to Learning: None  Education Outcome: Verbalized understanding;Demonstrated understanding    TIME IN/OUT:  Time In: 1055  Time Out: [address]  Minutes: 106 Monique Arshad PT

## 2023-11-11 NOTE — PROGRESS NOTES
Patient admitted earlier this morning with severe persistent asthma with acute exacerbation. Seen and examined at the bedside. On Airvo. Reports feeling better. Shortness of breath improved. We will continue current management at this time. Lactic acid 3.1, continue IV fluids.

## 2023-11-11 NOTE — CARE COORDINATION
11/11/23 1241   Service Assessment   Patient Orientation Alert and Oriented   Cognition Alert   History Provided By Patient   Primary Caregiver Self   PCP Verified by CM Yes  [facility])   Prior Functional Level Independent in ADLs/IADLs   Current Functional Level Independent in ADLs/IADLs   Can patient return to prior living arrangement Yes   Family able to assist with home care needs: Yes   Would you like for me to discuss the discharge plan with any other family members/significant others, and if so, who? No     SW met with patient to complete initial assessment. [facility] lives at [facility] with her  (Jb Tinsley, [phone]). Per patient, she requires no assistive DME and is independent with ADLs. Patient has a nebulizer that she uses as needed. Patient is without any current home health services. Patient states that her  will be available for support at discharge. PT/OT evaluations ordered. PPD ordered. SW will continue to follow.     KIRSTY Rogers, [address] [facility]   [phone]

## 2023-11-11 NOTE — ED NOTES
TRANSFER - OUT REPORT:    Verbal report given to Eduardo on Yoni Chong  being transferred to [facility] for routine progression of patient care       Report consisted of patient's Situation, Background, Assessment and   Recommendations(SBAR). Information from the following report(s) Nurse Handoff Report, ED Encounter Summary, ED SBAR, and [facility] was reviewed with the receiving nurse. Lines:   Peripheral IV 11/11/23 Left;Proximal Forearm (Active)       Peripheral IV 11/11/23 Right Antecubital (Active)   Site Assessment Clean, dry & intact 11/11/23 8080        Opportunity for questions and clarification was provided.       Patient transported with:  Monitor and Registered Nurse      Rachel Gabriel RN  11/11/23 6742

## 2023-11-11 NOTE — ED TRIAGE NOTES
Pt. Claus Manley off EMS stretcher to bed. Pt. From Assisted living. Per EMS pt c/o SOB starting yesterday. Pt. Seen by PCP who ordered xray and gave steroids. Pt. States taking rescue inhaler and nebulizer w/o relief,. Pt. States 90 on room air per EMS. Pt. Receive two albuterol treatment and 125 mg solumedrol en route  Pt.  Placed on 4L O2 NC per EMS

## 2023-11-11 NOTE — PLAN OF CARE
Problem: Discharge Planning  Goal: Discharge to home or other facility with appropriate resources  11/11/2023 1036 by Dwight Allison RN  Outcome: Progressing  11/11/2023 1035 by Dwight Allison RN  Outcome: Progressing  Flowsheets (Taken 11/11/2023 0800)  Discharge to home or other facility with appropriate resources:   Identify barriers to discharge with patient and caregiver   Arrange for needed discharge resources and transportation as appropriate   Identify discharge learning needs (meds, wound care, etc)   Refer to discharge planning if patient needs post-hospital services based on physician order or complex needs related to functional status, cognitive ability or social support system     Problem: Pain  Goal: Verbalizes/displays adequate comfort level or baseline comfort level  11/11/2023 1036 by Dwight Allison RN  Outcome: Progressing  11/11/2023 1035 by Dwight Allison RN  Outcome: Progressing  Flowsheets (Taken 11/11/2023 0749)  Verbalizes/displays adequate comfort level or baseline comfort level:   Encourage patient to monitor pain and request assistance   Assess pain using appropriate pain scale   Administer analgesics based on type and severity of pain and evaluate response   Implement non-pharmacological measures as appropriate and evaluate response   Consider cultural and social influences on pain and pain management   Notify Licensed Independent Practitioner if interventions unsuccessful or patient reports new pain     Problem: Skin/Tissue Integrity  Goal: Absence of new skin breakdown  Description: 1. Monitor for areas of redness and/or skin breakdown  2. Assess vascular access sites hourly  3. Every 4-6 hours minimum:  Change oxygen saturation probe site  4. Every 4-6 hours:  If on nasal continuous positive airway pressure, respiratory therapy assess nares and determine need for appliance change or resting period.   11/11/2023 1036 by Dwight Allison RN  Outcome:

## 2023-11-11 NOTE — H&P
Hospitalist History and Physical   Admit Date:  2023  3:47 AM   Name:  Madhavi Carmona   Age:  82 y.o. Sex:  female  :  1940   MRN:  [id number]   Room:  [facility]/    Presenting/Chief Complaint: Shortness of Breath (/)     Reason(s) for Admission: Severe persistent asthma with acute exacerbation [J45.51]     History of Present Illness:   Madhavi Carmona is a 82 y.o. female with medical history of asthma, hypertension, anemia who presented with severe shortness of breath. She recently had some teeth pulled which provoked severe anxiety. In the past he has often had as identified by anxiety. She is also had some allergy versus cold symptoms and seen primary care doctor. She presented to the emergency department with pursed breathing, high respiratory rate and severe wheezing. Her O2 sats were in the 80s she was placed on 4 L nasal cannula with improvement. She continued to have severe wheezing and tachypnea. She was transition to [employer]. Chest pain for further evaluation management. Assessment & Plan:   Severe persistent asthma with acute exacerbation  First severe attack in 18 month, CXR clear  -methylprednisolone > prednisone  -Mg 2g  -levalbuterol  -budesonide-formoterol  -pna labs    Acute respiratory failure with hypoxia  Required Airvo in ED  -O2, maintain O2sat > 92  -NIPPV titration, wean as able    Hypothyroidism (acquired)  -levothyroxine    Recurrent major depressive disorder, in full remission   -citalopram    Elevated brain natriuretic peptide (BNP) level  No prior BNP on file, 944 in ED  -echocardiogram  -tele    Acute lactic acidosis  Admission LA 3.8, bolus given ED  -bicarb @ 125    Class 2 severe obesity due to excess calories with serious comorbidity and body mass index (BMI) of 35.0 to 35.9 in adult   Increased risk of all cause mortality, complicating care  - healthy lifestyle at discharge    Patient is critically ill.   Without intervention, there is a high probability

## 2023-11-12 ENCOUNTER — APPOINTMENT (OUTPATIENT)
Dept: GENERAL RADIOLOGY | Age: 83
DRG: 189 | End: 2023-11-12
Payer: MEDICARE

## 2023-11-12 PROBLEM — I51.89 RIGHT ATRIAL MASS: Status: ACTIVE | Noted: 2023-11-12

## 2023-11-12 PROBLEM — I48.91 ATRIAL FIBRILLATION WITH RAPID VENTRICULAR RESPONSE (HCC): Status: ACTIVE | Noted: 2023-11-12

## 2023-11-12 PROBLEM — I05.0 MODERATE MITRAL VALVE STENOSIS: Status: ACTIVE | Noted: 2023-11-12

## 2023-11-12 LAB
ALBUMIN SERPL-MCNC: 3.1 G/DL (ref 3.2–4.6)
ALBUMIN/GLOB SERPL: 1.2 (ref 0.4–1.6)
ALP SERPL-CCNC: 55 U/L (ref 50–136)
ALT SERPL-CCNC: 17 U/L (ref 12–65)
ANION GAP SERPL CALC-SCNC: 3 MMOL/L (ref 2–11)
AST SERPL-CCNC: 12 U/L (ref 15–37)
BASOPHILS # BLD: 0 K/UL (ref 0–0.2)
BASOPHILS NFR BLD: 0 % (ref 0–2)
BILIRUB SERPL-MCNC: 0.3 MG/DL (ref 0.2–1.1)
BUN SERPL-MCNC: 11 MG/DL (ref 8–23)
CALCIUM SERPL-MCNC: 9 MG/DL (ref 8.3–10.4)
CHLORIDE SERPL-SCNC: 110 MMOL/L (ref 101–110)
CHOLEST SERPL-MCNC: 218 MG/DL
CO2 SERPL-SCNC: 31 MMOL/L (ref 21–32)
CREAT SERPL-MCNC: 0.54 MG/DL (ref 0.6–1)
DIFFERENTIAL METHOD BLD: ABNORMAL
EKG ATRIAL RATE: 0 BPM
EKG ATRIAL RATE: 0 BPM
EKG DIAGNOSIS: NORMAL
EKG DIAGNOSIS: NORMAL
EKG Q-T INTERVAL: 232 MS
EKG Q-T INTERVAL: 263 MS
EKG QRS DURATION: 82 MS
EKG QRS DURATION: 88 MS
EKG QTC CALCULATION (BAZETT): 384 MS
EKG QTC CALCULATION (BAZETT): 412 MS
EKG VENTRICULAR RATE: 147 BPM
EKG VENTRICULAR RATE: 164 BPM
EOSINOPHIL # BLD: 0 K/UL (ref 0–0.8)
EOSINOPHIL NFR BLD: 0 % (ref 0.5–7.8)
ERYTHROCYTE [DISTWIDTH] IN BLOOD BY AUTOMATED COUNT: 15.8 % (ref 11.9–14.6)
EST. AVERAGE GLUCOSE BLD GHB EST-MCNC: 105 MG/DL
GLOBULIN SER CALC-MCNC: 2.6 G/DL (ref 2.8–4.5)
GLUCOSE SERPL-MCNC: 168 MG/DL (ref 65–100)
HBA1C MFR BLD: 5.3 % (ref 4.8–5.6)
HCT VFR BLD AUTO: 40.1 % (ref 35.8–46.3)
HDLC SERPL-MCNC: 77 MG/DL (ref 40–60)
HDLC SERPL: 2.8
HGB BLD-MCNC: 12.7 G/DL (ref 11.7–15.4)
IMM GRANULOCYTES # BLD AUTO: 0.1 K/UL (ref 0–0.5)
IMM GRANULOCYTES NFR BLD AUTO: 1 % (ref 0–5)
INR PPP: 1
LDLC SERPL CALC-MCNC: 128.8 MG/DL
LYMPHOCYTES # BLD: 0.7 K/UL (ref 0.5–4.6)
LYMPHOCYTES NFR BLD: 5 % (ref 13–44)
MAGNESIUM SERPL-MCNC: 2.4 MG/DL (ref 1.8–2.4)
MCH RBC QN AUTO: 30.9 PG (ref 26.1–32.9)
MCHC RBC AUTO-ENTMCNC: 31.7 G/DL (ref 31.4–35)
MCV RBC AUTO: 97.6 FL (ref 82–102)
MM INDURATION, POC: 0 MM (ref 0–5)
MONOCYTES # BLD: 0.5 K/UL (ref 0.1–1.3)
MONOCYTES NFR BLD: 4 % (ref 4–12)
NEUTS SEG # BLD: 10.8 K/UL (ref 1.7–8.2)
NEUTS SEG NFR BLD: 89 % (ref 43–78)
NRBC # BLD: 0 K/UL (ref 0–0.2)
PHOSPHATE SERPL-MCNC: 3.4 MG/DL (ref 2.3–3.7)
PLATELET # BLD AUTO: 228 K/UL (ref 150–450)
PMV BLD AUTO: 10 FL (ref 9.4–12.3)
POTASSIUM SERPL-SCNC: 4 MMOL/L (ref 3.5–5.1)
PPD, POC: NEGATIVE
PROCALCITONIN SERPL-MCNC: <0.05 NG/ML (ref 0–0.49)
PROT SERPL-MCNC: 5.7 G/DL (ref 6.3–8.2)
PROTHROMBIN TIME: 13.5 SEC (ref 12.6–14.3)
RBC # BLD AUTO: 4.11 M/UL (ref 4.05–5.2)
SODIUM SERPL-SCNC: 144 MMOL/L (ref 133–143)
TRIGL SERPL-MCNC: 61 MG/DL (ref 35–150)
TSH W FREE THYROID IF ABNORMAL: 0.48 UIU/ML (ref 0.36–3.74)
VLDLC SERPL CALC-MCNC: 12.2 MG/DL (ref 6–23)
WBC # BLD AUTO: 12 K/UL (ref 4.3–11.1)

## 2023-11-12 PROCEDURE — 2700000000 HC OXYGEN THERAPY PER DAY

## 2023-11-12 PROCEDURE — 83036 HEMOGLOBIN GLYCOSYLATED A1C: CPT

## 2023-11-12 PROCEDURE — 6360000002 HC RX W HCPCS: Performed by: FAMILY MEDICINE

## 2023-11-12 PROCEDURE — 2500000003 HC RX 250 WO HCPCS: Performed by: FAMILY MEDICINE

## 2023-11-12 PROCEDURE — 94640 AIRWAY INHALATION TREATMENT: CPT

## 2023-11-12 PROCEDURE — 94761 N-INVAS EAR/PLS OXIMETRY MLT: CPT

## 2023-11-12 PROCEDURE — 71045 X-RAY EXAM CHEST 1 VIEW: CPT

## 2023-11-12 PROCEDURE — 99222 1ST HOSP IP/OBS MODERATE 55: CPT | Performed by: INTERNAL MEDICINE

## 2023-11-12 PROCEDURE — 93010 ELECTROCARDIOGRAM REPORT: CPT | Performed by: INTERNAL MEDICINE

## 2023-11-12 PROCEDURE — 80061 LIPID PANEL: CPT

## 2023-11-12 PROCEDURE — 80053 COMPREHEN METABOLIC PANEL: CPT

## 2023-11-12 PROCEDURE — 2000000000 HC ICU R&B

## 2023-11-12 PROCEDURE — 6360000002 HC RX W HCPCS: Performed by: INTERNAL MEDICINE

## 2023-11-12 PROCEDURE — 84145 PROCALCITONIN (PCT): CPT

## 2023-11-12 PROCEDURE — 6370000000 HC RX 637 (ALT 250 FOR IP): Performed by: INTERNAL MEDICINE

## 2023-11-12 PROCEDURE — 85610 PROTHROMBIN TIME: CPT

## 2023-11-12 PROCEDURE — 84100 ASSAY OF PHOSPHORUS: CPT

## 2023-11-12 PROCEDURE — 2580000003 HC RX 258: Performed by: FAMILY MEDICINE

## 2023-11-12 PROCEDURE — 6370000000 HC RX 637 (ALT 250 FOR IP): Performed by: FAMILY MEDICINE

## 2023-11-12 PROCEDURE — 83735 ASSAY OF MAGNESIUM: CPT

## 2023-11-12 PROCEDURE — 85025 COMPLETE CBC W/AUTO DIFF WBC: CPT

## 2023-11-12 PROCEDURE — 93005 ELECTROCARDIOGRAM TRACING: CPT | Performed by: FAMILY MEDICINE

## 2023-11-12 PROCEDURE — 84443 ASSAY THYROID STIM HORMONE: CPT

## 2023-11-12 RX ORDER — ENOXAPARIN SODIUM 100 MG/ML
80 INJECTION SUBCUTANEOUS 2 TIMES DAILY
Status: DISCONTINUED | OUTPATIENT
Start: 2023-11-12 | End: 2023-11-14 | Stop reason: HOSPADM

## 2023-11-12 RX ORDER — SODIUM CHLORIDE, SODIUM LACTATE, POTASSIUM CHLORIDE, AND CALCIUM CHLORIDE .6; .31; .03; .02 G/100ML; G/100ML; G/100ML; G/100ML
500 INJECTION, SOLUTION INTRAVENOUS ONCE
Status: DISCONTINUED | OUTPATIENT
Start: 2023-11-12 | End: 2023-11-14 | Stop reason: HOSPADM

## 2023-11-12 RX ORDER — LEVALBUTEROL INHALATION SOLUTION 0.63 MG/3ML
0.63 SOLUTION RESPIRATORY (INHALATION)
Status: DISCONTINUED | OUTPATIENT
Start: 2023-11-12 | End: 2023-11-14 | Stop reason: HOSPADM

## 2023-11-12 RX ORDER — WARFARIN SODIUM 5 MG/1
5 TABLET ORAL DAILY
Status: DISCONTINUED | OUTPATIENT
Start: 2023-11-12 | End: 2023-11-14 | Stop reason: HOSPADM

## 2023-11-12 RX ORDER — FUROSEMIDE 10 MG/ML
20 INJECTION INTRAMUSCULAR; INTRAVENOUS ONCE
Status: COMPLETED | OUTPATIENT
Start: 2023-11-12 | End: 2023-11-12

## 2023-11-12 RX ORDER — DILTIAZEM HYDROCHLORIDE 5 MG/ML
10 INJECTION INTRAVENOUS ONCE
Status: COMPLETED | OUTPATIENT
Start: 2023-11-12 | End: 2023-11-12

## 2023-11-12 RX ORDER — DILTIAZEM HYDROCHLORIDE 5 MG/ML
INJECTION INTRAVENOUS
Status: DISPENSED
Start: 2023-11-12 | End: 2023-11-12

## 2023-11-12 RX ORDER — MAGNESIUM SULFATE IN WATER 40 MG/ML
2000 INJECTION, SOLUTION INTRAVENOUS ONCE
Status: COMPLETED | OUTPATIENT
Start: 2023-11-12 | End: 2023-11-12

## 2023-11-12 RX ORDER — DILTIAZEM HYDROCHLORIDE 180 MG/1
180 CAPSULE, COATED, EXTENDED RELEASE ORAL DAILY
Status: DISCONTINUED | OUTPATIENT
Start: 2023-11-12 | End: 2023-11-13

## 2023-11-12 RX ORDER — ENOXAPARIN SODIUM 100 MG/ML
40 INJECTION SUBCUTANEOUS
Status: COMPLETED | OUTPATIENT
Start: 2023-11-12 | End: 2023-11-12

## 2023-11-12 RX ORDER — LEVALBUTEROL INHALATION SOLUTION 0.63 MG/3ML
0.63 SOLUTION RESPIRATORY (INHALATION) EVERY 4 HOURS
Status: DISCONTINUED | OUTPATIENT
Start: 2023-11-12 | End: 2023-11-12

## 2023-11-12 RX ADMIN — LEVALBUTEROL HYDROCHLORIDE 0.63 MG: 0.63 SOLUTION RESPIRATORY (INHALATION) at 11:18

## 2023-11-12 RX ADMIN — FUROSEMIDE 20 MG: 10 INJECTION, SOLUTION INTRAMUSCULAR; INTRAVENOUS at 09:26

## 2023-11-12 RX ADMIN — Medication 3 MG: at 21:32

## 2023-11-12 RX ADMIN — SODIUM CHLORIDE 5 MG/HR: 900 INJECTION, SOLUTION INTRAVENOUS at 03:43

## 2023-11-12 RX ADMIN — ENOXAPARIN SODIUM 80 MG: 100 INJECTION SUBCUTANEOUS at 23:27

## 2023-11-12 RX ADMIN — SODIUM CHLORIDE, PRESERVATIVE FREE 15 ML: 5 INJECTION INTRAVENOUS at 19:10

## 2023-11-12 RX ADMIN — ACETAMINOPHEN 650 MG: 325 TABLET, FILM COATED ORAL at 15:19

## 2023-11-12 RX ADMIN — GABAPENTIN 400 MG: 400 CAPSULE ORAL at 21:30

## 2023-11-12 RX ADMIN — SODIUM CHLORIDE, POTASSIUM CHLORIDE, SODIUM LACTATE AND CALCIUM CHLORIDE: 600; 310; 30; 20 INJECTION, SOLUTION INTRAVENOUS at 03:44

## 2023-11-12 RX ADMIN — WATER 40 MG: 1 INJECTION INTRAMUSCULAR; INTRAVENOUS; SUBCUTANEOUS at 08:06

## 2023-11-12 RX ADMIN — WATER 40 MG: 1 INJECTION INTRAMUSCULAR; INTRAVENOUS; SUBCUTANEOUS at 15:19

## 2023-11-12 RX ADMIN — DILTIAZEM HYDROCHLORIDE 10 MG: 5 INJECTION, SOLUTION INTRAVENOUS at 00:45

## 2023-11-12 RX ADMIN — ENOXAPARIN SODIUM 40 MG: 100 INJECTION SUBCUTANEOUS at 08:05

## 2023-11-12 RX ADMIN — SODIUM CHLORIDE, SODIUM LACTATE, POTASSIUM CHLORIDE, AND CALCIUM CHLORIDE 500 ML: .6; .31; .03; .02 INJECTION, SOLUTION INTRAVENOUS at 01:33

## 2023-11-12 RX ADMIN — WARFARIN SODIUM 5 MG: 5 TABLET ORAL at 18:01

## 2023-11-12 RX ADMIN — SODIUM CHLORIDE, PRESERVATIVE FREE 10 ML: 5 INJECTION INTRAVENOUS at 08:06

## 2023-11-12 RX ADMIN — LEVALBUTEROL HYDROCHLORIDE 0.63 MG: 0.63 SOLUTION RESPIRATORY (INHALATION) at 15:14

## 2023-11-12 RX ADMIN — ACETAMINOPHEN 650 MG: 325 TABLET, FILM COATED ORAL at 04:11

## 2023-11-12 RX ADMIN — LEVALBUTEROL HYDROCHLORIDE 0.63 MG: 0.63 SOLUTION RESPIRATORY (INHALATION) at 23:53

## 2023-11-12 RX ADMIN — LEVOTHYROXINE SODIUM 137 MCG: 0.11 TABLET ORAL at 08:04

## 2023-11-12 RX ADMIN — ENOXAPARIN SODIUM 40 MG: 100 INJECTION SUBCUTANEOUS at 12:03

## 2023-11-12 RX ADMIN — ACETAMINOPHEN 650 MG: 325 TABLET, FILM COATED ORAL at 21:32

## 2023-11-12 RX ADMIN — WATER 40 MG: 1 INJECTION INTRAMUSCULAR; INTRAVENOUS; SUBCUTANEOUS at 21:32

## 2023-11-12 RX ADMIN — MAGNESIUM SULFATE HEPTAHYDRATE 2000 MG: 40 INJECTION, SOLUTION INTRAVENOUS at 12:02

## 2023-11-12 RX ADMIN — WATER 40 MG: 1 INJECTION INTRAMUSCULAR; INTRAVENOUS; SUBCUTANEOUS at 03:18

## 2023-11-12 RX ADMIN — TRAZODONE HYDROCHLORIDE 50 MG: 50 TABLET ORAL at 21:32

## 2023-11-12 RX ADMIN — LEVALBUTEROL HYDROCHLORIDE 0.63 MG: 0.63 SOLUTION RESPIRATORY (INHALATION) at 19:20

## 2023-11-12 RX ADMIN — LEVALBUTEROL HYDROCHLORIDE 0.63 MG: 0.63 SOLUTION RESPIRATORY (INHALATION) at 03:59

## 2023-11-12 RX ADMIN — SODIUM CHLORIDE, PRESERVATIVE FREE 15 ML: 5 INJECTION INTRAVENOUS at 23:28

## 2023-11-12 ASSESSMENT — PAIN SCALES - GENERAL
PAINLEVEL_OUTOF10: 3
PAINLEVEL_OUTOF10: 5
PAINLEVEL_OUTOF10: 0
PAINLEVEL_OUTOF10: 3
PAINLEVEL_OUTOF10: 0
PAINLEVEL_OUTOF10: 1
PAINLEVEL_OUTOF10: 3
PAINLEVEL_OUTOF10: 0
PAINLEVEL_OUTOF10: 0

## 2023-11-12 ASSESSMENT — PAIN DESCRIPTION - LOCATION
LOCATION: HEAD
LOCATION: OTHER (COMMENT)
LOCATION: CHEST;STERNUM

## 2023-11-12 ASSESSMENT — PAIN DESCRIPTION - ORIENTATION: ORIENTATION: MID

## 2023-11-12 ASSESSMENT — PAIN DESCRIPTION - DESCRIPTORS
DESCRIPTORS: ACHING
DESCRIPTORS: PRESSURE

## 2023-11-12 ASSESSMENT — PAIN - FUNCTIONAL ASSESSMENT: PAIN_FUNCTIONAL_ASSESSMENT: ACTIVITIES ARE NOT PREVENTED

## 2023-11-12 NOTE — PROGRESS NOTES
Physical Therapy Note:    Attempted to see patient this AM for physical therapy treatment  session. Patient with new onset A-fib stared on lovenox, pt already up to chair, pt mod I with bed mobility and transfers, still on Airvo and unable to increase ambulation distance at this time, will hold pt today and check back tomorrow. Pt and nsg aware and agree. Thank you.     Prosper Oakes, PT     Rehab Caseload Tracker

## 2023-11-12 NOTE — SIGNIFICANT EVENT
Again in AF RVR. Added diltiazem 10 mg IV. Returned to NSR. Again in AF RVR. Started diltiazem gtt titration.

## 2023-11-12 NOTE — PROGRESS NOTES
rashes and normal coloration. Warm and dry. Neuro:  CN II-XII grossly intact. Psych:  Normal mood and affect.       I have personally reviewed labs and tests:  Recent Labs:  Recent Results (from the past 48 hour(s))   Basic Metabolic Panel    Collection Time: 11/11/23  4:12 AM   Result Value Ref Range    Sodium 138 133 - 143 mmol/L    Potassium 3.9 3.5 - 5.1 mmol/L    Chloride 104 101 - 110 mmol/L    CO2 26 21 - 32 mmol/L    Anion Gap 8 2 - 11 mmol/L    Glucose 143 (H) 65 - 100 mg/dL    BUN 21 8 - 23 MG/DL    Creatinine 0.92 0.6 - 1.0 MG/DL    Est, Glom Filt Rate >60 >60 ml/min/1.73m2    Calcium 10.4 8.3 - 10.4 MG/DL   CBC with Auto Differential    Collection Time: 11/11/23  4:12 AM   Result Value Ref Range    WBC 11.8 (H) 4.3 - 11.1 K/uL    RBC 4.86 4.05 - 5.2 M/uL    Hemoglobin 14.9 11.7 - 15.4 g/dL    Hematocrit 46.8 (H) 35.8 - 46.3 %    MCV 96.3 82.0 - 102.0 FL    MCH 30.7 26.1 - 32.9 PG    MCHC 31.8 31.4 - 35.0 g/dL    RDW 15.2 (H) 11.9 - 14.6 %    Platelets 414 747 - 559 K/uL    MPV 10.3 9.4 - 12.3 FL    nRBC 0.00 0.0 - 0.2 K/uL    Differential Type AUTOMATED      Neutrophils % 71 43 - 78 %    Lymphocytes % 18 13 - 44 %    Monocytes % 10 4.0 - 12.0 %    Eosinophils % 0 (L) 0.5 - 7.8 %    Basophils % 0 0.0 - 2.0 %    Immature Granulocytes 1 0.0 - 5.0 %    Neutrophils Absolute 8.3 (H) 1.7 - 8.2 K/UL    Lymphocytes Absolute 2.1 0.5 - 4.6 K/UL    Monocytes Absolute 1.2 0.1 - 1.3 K/UL    Eosinophils Absolute 0.0 0.0 - 0.8 K/UL    Basophils Absolute 0.0 0.0 - 0.2 K/UL    Absolute Immature Granulocyte 0.1 0.0 - 0.5 K/UL   Troponin    Collection Time: 11/11/23  4:12 AM   Result Value Ref Range    Troponin, High Sensitivity 11.1 0 - 14 pg/mL   Magnesium    Collection Time: 11/11/23  4:12 AM   Result Value Ref Range    Magnesium 2.2 1.8 - 2.4 mg/dL   Brain Natriuretic Peptide    Collection Time: 11/11/23  4:12 AM   Result Value Ref Range    NT Pro- (H) <450 PG/ML   Blood Culture 1    Collection Time: capsule 400 mg  400 mg Oral Nightly    traZODone (DESYREL) tablet 50 mg  50 mg Oral Nightly    melatonin tablet 3 mg  3 mg Oral Nightly       Signed:  Manda Santiago MD    Part of this note may have been written by using a voice dictation software. The note has been proof read but may still contain some grammatical/other typographical errors.

## 2023-11-12 NOTE — PLAN OF CARE
Problem: Respiratory - Adult  Goal: Achieves optimal ventilation and oxygenation  11/12/2023 0827 by Arturo Carvajal RCP  Outcome: Progressing  11/11/2023 2047 by Ameena Ludwig RCP  Outcome: Progressing  Flowsheets (Taken 11/11/2023 2047)  Achieves optimal ventilation and oxygenation:   Assess for changes in respiratory status   Assess for changes in mentation and behavior   Position to facilitate oxygenation and minimize respiratory effort   Oxygen supplementation based on oxygen saturation or arterial blood gases   Initiate smoking cessation protocol as indicated   Encourage broncho-pulmonary hygiene including cough, deep breathe, incentive spirometry   Assess the need for suctioning and aspirate as needed   Assess and instruct to report shortness of breath or any respiratory difficulty   Respiratory therapy support as indicated

## 2023-11-12 NOTE — CONSULTS
[facility] Cardiology Initial Cardiac Evaluation      Date of  Admission: 11/11/2023  3:47 AM     Primary Care Physician: Xavier Spring MD  Primary Cardiologist: none   Referring Physician: Dr. Jo-Ann Madrid  Supervising Physician: Dr. Franklin Palm    CC/Reason for evaluation: Afib RVR, Cardiac mass    HPI:  Kye Washington is a 82 y.o. female with prior medical history of HTN, asthma, anxiety. She presented to [facility] ED with worsening SOB for several days. EMS was called and she was 92% on RA, placed on 4L NC and was given IV steroids. Workup in the ED revealed: pt w/ diffuse wheezing and increased WOB. , LA 3.8, WBC 11.8, K 3.9, EKG in NSR, CXR w/ no acute changes. She was given nebs, breathing treatments, abx then placed on Airvo and hospitalist services admitted for her asthma exacerbation. 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 Then on 11/12 around 0100 she went back into Afib with RVR per records. She was given 10mg IV Dilt, converted to NSR, then went back into a supraventricular tachycardia and placed on IV diltiazem drip. Patient reports she felt elevated heart rate after practicing holding her breath this afternoon subsequently found to be in SVT. Improved on diltiazem drip currently in sinus rhythm with frequent PACs. Patient reports continued shortness of breath and wheezing. Denies prior history of cardiovascular medical problems. No chest pain complaints abdominal pain, nausea vomiting at this time. No other alleviating or aggravating factors identified. No other associated symptoms. Symptoms are moderate to severe at times. Recent Cardiac Synopsis:  11/11/23 TTE  Interpretation Summary    Left Ventricle: Hyperdynamic left ventricular systolic function. EF by 2D Simpsons Biplane is 73%. Left ventricle size is normal. Mildly increased wall thickness. Findings consistent with concentric remodeling.  Normal wall status improves to better characterize right atrial mass. Given location myxoma possible, discussed plan for imaging with the patient and her  today. GERD (gastroesophageal reflux disease)  - PPI      Recurrent major depressive disorder, in full remission ([address])  - mgmt per primary      Acute lactic acidosis  - resolved      Class 2 severe obesity due to excess calories with serious comorbidity and body mass index (BMI) of 35.0 to 35.9 in adult ([address])  - BMI 36.2, patient would benefit from lifestyle modifications such as diet, exercise and weight loss to decrease overall mortality associated with obesity      Hypothyroidism (acquired)  - continue home synthroid    Medical decision making as per assessment and plan above.   Jett Koenig, DO

## 2023-11-12 NOTE — PROGRESS NOTES
Patient admitted with acute hypoxic respiratory failure. Echo shows moderate to severe MS and right atrial mass of unclear etiology. Pending CMR for further assessment. Also with new onset afib with RVR. Will need to start warfarin given her MS. Recommend therapeutic Lovenox for now while bridging to warfarin. Start diltiazem 180mg now that off diltiazem gtt. Full consult to follow tomorrow.

## 2023-11-12 NOTE — PLAN OF CARE
Problem: Discharge Planning  Goal: Discharge to home or other facility with appropriate resources  Outcome: Progressing  Flowsheets (Taken 11/12/2023 0745)  Discharge to home or other facility with appropriate resources:   Identify barriers to discharge with patient and caregiver   Arrange for needed discharge resources and transportation as appropriate   Identify discharge learning needs (meds, wound care, etc)   Refer to discharge planning if patient needs post-hospital services based on physician order or complex needs related to functional status, cognitive ability or social support system     Problem: Pain  Goal: Verbalizes/displays adequate comfort level or baseline comfort level  Outcome: Progressing  Flowsheets (Taken 11/12/2023 0745)  Verbalizes/displays adequate comfort level or baseline comfort level:   Encourage patient to monitor pain and request assistance   Assess pain using appropriate pain scale   Administer analgesics based on type and severity of pain and evaluate response   Implement non-pharmacological measures as appropriate and evaluate response   Consider cultural and social influences on pain and pain management   Notify Licensed Independent Practitioner if interventions unsuccessful or patient reports new pain     Problem: Skin/Tissue Integrity  Goal: Absence of new skin breakdown  Description: 1. Monitor for areas of redness and/or skin breakdown  2. Assess vascular access sites hourly  3. Every 4-6 hours minimum:  Change oxygen saturation probe site  4. Every 4-6 hours:  If on nasal continuous positive airway pressure, respiratory therapy assess nares and determine need for appliance change or resting period.   Outcome: Progressing     Problem: Respiratory - Adult  Goal: Achieves optimal ventilation and oxygenation  11/12/2023 1148 by Slade Jaime RN  Outcome: Progressing  11/12/2023 0827 by Gabbi Christopher RCP  Outcome: Progressing  Flowsheets (Taken 11/12/2023 0745 by

## 2023-11-12 NOTE — PLAN OF CARE
Problem: Discharge Planning  Goal: Discharge to home or other facility with appropriate resources  Outcome: Progressing  Flowsheets (Taken 11/12/2023 8473)  Discharge to home or other facility with appropriate resources:   Identify barriers to discharge with patient and caregiver   Arrange for needed discharge resources and transportation as appropriate   Identify discharge learning needs (meds, wound care, etc)   Refer to discharge planning if patient needs post-hospital services based on physician order or complex needs related to functional status, cognitive ability or social support system

## 2023-11-12 NOTE — PROGRESS NOTES
Patient given first dose of coumadin. Education provided regarding coumadin medication, INR testing, S/S for medication and INR testing, and diet restrictions. Educational handouts given printed from [employer]. Patient verbalized understanding.

## 2023-11-12 NOTE — PLAN OF CARE
Problem: Respiratory - Adult  Goal: Achieves optimal ventilation and oxygenation  11/11/2023 2047 by Juan Alberto Almaguer RCP  Outcome: Progressing  Flowsheets (Taken 11/11/2023 2047)  Achieves optimal ventilation and oxygenation:   Assess for changes in respiratory status   Assess for changes in mentation and behavior   Position to facilitate oxygenation and minimize respiratory effort   Oxygen supplementation based on oxygen saturation or arterial blood gases   Initiate smoking cessation protocol as indicated   Encourage broncho-pulmonary hygiene including cough, deep breathe, incentive spirometry   Assess the need for suctioning and aspirate as needed   Assess and instruct to report shortness of breath or any respiratory difficulty   Respiratory therapy support as indicated  11/11/2023 1036 by Prince Rocha RN  Outcome: Progressing  11/11/2023 1035 by Prince Rocha RN  Outcome: Progressing

## 2023-11-12 NOTE — PROGRESS NOTES
Patient HR 80 and BP 96/81. Notified MD, and questioned if MD still wanted cardizem administered. MD gave telephone order to hold this dose. Dose held and charted.  Will cont to monitor cardiac status

## 2023-11-12 NOTE — PROGRESS NOTES
Heart rate shift summary    1942 - Patient with rhythm change with rate in the 140-150s, vagal maneuvers ineffective. 1947 - 12-lead obtained  Mahi Alvarez MD in unit, orders placed  1951 - patient spontaneously returned to NSR before orders could be implemented, orders discontinued    0031 - Patient again with rhythm change with rate in the 140-150s  0038 - MSD notified, orders for 10mg IV push diltiazem received. 0045 - diltiazem administered, patient converted to NSR at dino 0048.    0122 - Patient again with rhythm change with rate in the 140-150s. Hypotension noted. 0123 - Orders for diltiazem infusion and 500mL LR bolus received  0126 - patient spontaneously returned to NSR prior to infusion administration. 8043 - Patient again with rhythm change with rate in the 140-160s.  0348 - Diltiazem infusion initiated at 5mg/hr per orders  0407 - Patient with continued heart rate in the 140's now with moderate mid chest pain described as pressure. Repeat 12-lead EKG obtained. MD notified  8100 - Patient returned to NSR on continuous infusion of diltiazem at 5mg/hr with resolution of chest pain  0419 - MD notified of conversion and resolution of chest pain.

## 2023-11-12 NOTE — PROGRESS NOTES
Warfarin dosing per pharmacist    Soila Ca is a 82 y.o. female. Lab Results   Component Value Date/Time    INR 1.0 11/12/2023 04:36 PM       Indication:  Afib    Goal INR:  2 - 3    Home dose:  new start    Risk factors or significant drug interactions:  Levothyroxine, Methylprednisolone, Prednisone (may enhance the AC effect of Coumadin) and Traodone (may diminish the AC effect of Coumadin)    Other anticoagulants:  Lovenox (bridge)    Daily Monitoring  Date  INR     Warfarin dose HGB              Notes  11/12/23 1.0  (5 mg)  ---        New start of Coumadin for Afib. INR is sub-therapeutic. Will start Coumadin at dose of 5 mg every evening. Daily INRs ordered. Pharmacy will follow and adjust doses as indicated.       Thank you,    Ismael Mccoy, PharmD

## 2023-11-13 LAB
ALBUMIN SERPL-MCNC: 3.2 G/DL (ref 3.2–4.6)
ALBUMIN/GLOB SERPL: 1.2 (ref 0.4–1.6)
ALP SERPL-CCNC: 57 U/L (ref 50–136)
ALT SERPL-CCNC: 19 U/L (ref 12–65)
ANION GAP SERPL CALC-SCNC: 2 MMOL/L (ref 2–11)
AST SERPL-CCNC: 11 U/L (ref 15–37)
BILIRUB SERPL-MCNC: 0.3 MG/DL (ref 0.2–1.1)
BUN SERPL-MCNC: 19 MG/DL (ref 8–23)
CALCIUM SERPL-MCNC: 9 MG/DL (ref 8.3–10.4)
CHLORIDE SERPL-SCNC: 109 MMOL/L (ref 101–110)
CO2 SERPL-SCNC: 31 MMOL/L (ref 21–32)
CREAT SERPL-MCNC: 0.6 MG/DL (ref 0.6–1)
EKG ATRIAL RATE: 88 BPM
EKG DIAGNOSIS: NORMAL
EKG P AXIS: 91 DEGREES
EKG P-R INTERVAL: 160 MS
EKG Q-T INTERVAL: 372 MS
EKG QRS DURATION: 92 MS
EKG QTC CALCULATION (BAZETT): 450 MS
EKG R AXIS: 2 DEGREES
EKG T AXIS: 37 DEGREES
EKG VENTRICULAR RATE: 88 BPM
ERYTHROCYTE [DISTWIDTH] IN BLOOD BY AUTOMATED COUNT: 15.9 % (ref 11.9–14.6)
GLOBULIN SER CALC-MCNC: 2.7 G/DL (ref 2.8–4.5)
GLUCOSE SERPL-MCNC: 163 MG/DL (ref 65–100)
HCT VFR BLD AUTO: 40.4 % (ref 35.8–46.3)
HGB BLD-MCNC: 12.8 G/DL (ref 11.7–15.4)
INR PPP: 1.1
MAGNESIUM SERPL-MCNC: 2.7 MG/DL (ref 1.8–2.4)
MCH RBC QN AUTO: 31.2 PG (ref 26.1–32.9)
MCHC RBC AUTO-ENTMCNC: 31.7 G/DL (ref 31.4–35)
MCV RBC AUTO: 98.5 FL (ref 82–102)
MM INDURATION, POC: 0 MM (ref 0–5)
NRBC # BLD: 0 K/UL (ref 0–0.2)
PHOSPHATE SERPL-MCNC: 3.2 MG/DL (ref 2.3–3.7)
PLATELET # BLD AUTO: 220 K/UL (ref 150–450)
PMV BLD AUTO: 10.2 FL (ref 9.4–12.3)
POTASSIUM SERPL-SCNC: 3.9 MMOL/L (ref 3.5–5.1)
PPD, POC: NEGATIVE
PROCALCITONIN SERPL-MCNC: <0.05 NG/ML (ref 0–0.49)
PROT SERPL-MCNC: 5.9 G/DL (ref 6.3–8.2)
PROTHROMBIN TIME: 14.2 SEC (ref 12.6–14.3)
RBC # BLD AUTO: 4.1 M/UL (ref 4.05–5.2)
SODIUM SERPL-SCNC: 142 MMOL/L (ref 133–143)
WBC # BLD AUTO: 11.9 K/UL (ref 4.3–11.1)

## 2023-11-13 PROCEDURE — 94761 N-INVAS EAR/PLS OXIMETRY MLT: CPT

## 2023-11-13 PROCEDURE — 2000000000 HC ICU R&B

## 2023-11-13 PROCEDURE — 94640 AIRWAY INHALATION TREATMENT: CPT

## 2023-11-13 PROCEDURE — 6370000000 HC RX 637 (ALT 250 FOR IP): Performed by: FAMILY MEDICINE

## 2023-11-13 PROCEDURE — 2500000003 HC RX 250 WO HCPCS: Performed by: FAMILY MEDICINE

## 2023-11-13 PROCEDURE — 2700000000 HC OXYGEN THERAPY PER DAY

## 2023-11-13 PROCEDURE — 84100 ASSAY OF PHOSPHORUS: CPT

## 2023-11-13 PROCEDURE — 93005 ELECTROCARDIOGRAM TRACING: CPT | Performed by: INTERNAL MEDICINE

## 2023-11-13 PROCEDURE — 83735 ASSAY OF MAGNESIUM: CPT

## 2023-11-13 PROCEDURE — 85027 COMPLETE CBC AUTOMATED: CPT

## 2023-11-13 PROCEDURE — 6360000002 HC RX W HCPCS: Performed by: FAMILY MEDICINE

## 2023-11-13 PROCEDURE — 80053 COMPREHEN METABOLIC PANEL: CPT

## 2023-11-13 PROCEDURE — 6370000000 HC RX 637 (ALT 250 FOR IP): Performed by: INTERNAL MEDICINE

## 2023-11-13 PROCEDURE — 84145 PROCALCITONIN (PCT): CPT

## 2023-11-13 PROCEDURE — 2580000003 HC RX 258: Performed by: FAMILY MEDICINE

## 2023-11-13 PROCEDURE — 93010 ELECTROCARDIOGRAM REPORT: CPT | Performed by: INTERNAL MEDICINE

## 2023-11-13 PROCEDURE — 6360000002 HC RX W HCPCS: Performed by: INTERNAL MEDICINE

## 2023-11-13 PROCEDURE — 85610 PROTHROMBIN TIME: CPT

## 2023-11-13 PROCEDURE — 36415 COLL VENOUS BLD VENIPUNCTURE: CPT

## 2023-11-13 RX ORDER — SODIUM CHLORIDE FOR INHALATION 3 %
4 VIAL, NEBULIZER (ML) INHALATION 2 TIMES DAILY
Status: DISCONTINUED | OUTPATIENT
Start: 2023-11-13 | End: 2023-11-14 | Stop reason: HOSPADM

## 2023-11-13 RX ORDER — DILTIAZEM HYDROCHLORIDE 240 MG/1
240 CAPSULE, COATED, EXTENDED RELEASE ORAL DAILY
Status: DISCONTINUED | OUTPATIENT
Start: 2023-11-14 | End: 2023-11-14 | Stop reason: HOSPADM

## 2023-11-13 RX ORDER — FUROSEMIDE 40 MG/1
20 TABLET ORAL DAILY
Status: DISCONTINUED | OUTPATIENT
Start: 2023-11-13 | End: 2023-11-14 | Stop reason: HOSPADM

## 2023-11-13 RX ORDER — GUAIFENESIN 600 MG/1
600 TABLET, EXTENDED RELEASE ORAL 2 TIMES DAILY
Status: DISCONTINUED | OUTPATIENT
Start: 2023-11-13 | End: 2023-11-14 | Stop reason: HOSPADM

## 2023-11-13 RX ADMIN — WATER 40 MG: 1 INJECTION INTRAMUSCULAR; INTRAVENOUS; SUBCUTANEOUS at 23:58

## 2023-11-13 RX ADMIN — MOMETASONE FUROATE AND FORMOTEROL FUMARATE DIHYDRATE 2 PUFF: 200; 5 AEROSOL RESPIRATORY (INHALATION) at 07:29

## 2023-11-13 RX ADMIN — Medication 3 MG: at 22:10

## 2023-11-13 RX ADMIN — ACETAMINOPHEN 650 MG: 325 TABLET, FILM COATED ORAL at 09:46

## 2023-11-13 RX ADMIN — WATER 40 MG: 1 INJECTION INTRAMUSCULAR; INTRAVENOUS; SUBCUTANEOUS at 15:04

## 2023-11-13 RX ADMIN — LEVALBUTEROL HYDROCHLORIDE 0.63 MG: 0.63 SOLUTION RESPIRATORY (INHALATION) at 11:19

## 2023-11-13 RX ADMIN — LEVALBUTEROL HYDROCHLORIDE 0.63 MG: 0.63 SOLUTION RESPIRATORY (INHALATION) at 07:29

## 2023-11-13 RX ADMIN — MOMETASONE FUROATE AND FORMOTEROL FUMARATE DIHYDRATE 2 PUFF: 200; 5 AEROSOL RESPIRATORY (INHALATION) at 20:17

## 2023-11-13 RX ADMIN — SODIUM CHLORIDE, PRESERVATIVE FREE 10 ML: 5 INJECTION INTRAVENOUS at 22:11

## 2023-11-13 RX ADMIN — SODIUM CHLORIDE SOLN NEBU 3% 4 ML: 3 NEBU SOLN at 11:19

## 2023-11-13 RX ADMIN — GUAIFENESIN 600 MG: 600 TABLET, EXTENDED RELEASE ORAL at 10:46

## 2023-11-13 RX ADMIN — WATER 40 MG: 1 INJECTION INTRAMUSCULAR; INTRAVENOUS; SUBCUTANEOUS at 02:54

## 2023-11-13 RX ADMIN — WATER 40 MG: 1 INJECTION INTRAMUSCULAR; INTRAVENOUS; SUBCUTANEOUS at 07:39

## 2023-11-13 RX ADMIN — ENOXAPARIN SODIUM 80 MG: 100 INJECTION SUBCUTANEOUS at 22:10

## 2023-11-13 RX ADMIN — SODIUM CHLORIDE 5 MG/HR: 900 INJECTION, SOLUTION INTRAVENOUS at 12:11

## 2023-11-13 RX ADMIN — DILTIAZEM HYDROCHLORIDE 180 MG: 180 CAPSULE, COATED, EXTENDED RELEASE ORAL at 07:38

## 2023-11-13 RX ADMIN — WARFARIN SODIUM 5 MG: 5 TABLET ORAL at 16:43

## 2023-11-13 RX ADMIN — LEVALBUTEROL HYDROCHLORIDE 0.63 MG: 0.63 SOLUTION RESPIRATORY (INHALATION) at 15:45

## 2023-11-13 RX ADMIN — LEVOTHYROXINE SODIUM 137 MCG: 0.11 TABLET ORAL at 06:24

## 2023-11-13 RX ADMIN — TRAZODONE HYDROCHLORIDE 50 MG: 50 TABLET ORAL at 22:10

## 2023-11-13 RX ADMIN — LEVALBUTEROL HYDROCHLORIDE 0.63 MG: 0.63 SOLUTION RESPIRATORY (INHALATION) at 20:14

## 2023-11-13 RX ADMIN — GABAPENTIN 400 MG: 400 CAPSULE ORAL at 22:10

## 2023-11-13 RX ADMIN — FUROSEMIDE 20 MG: 40 TABLET ORAL at 11:32

## 2023-11-13 RX ADMIN — SODIUM CHLORIDE SOLN NEBU 3% 4 ML: 3 NEBU SOLN at 20:14

## 2023-11-13 RX ADMIN — ENOXAPARIN SODIUM 80 MG: 100 INJECTION SUBCUTANEOUS at 09:47

## 2023-11-13 RX ADMIN — LEVALBUTEROL HYDROCHLORIDE 0.63 MG: 0.63 SOLUTION RESPIRATORY (INHALATION) at 03:17

## 2023-11-13 RX ADMIN — GUAIFENESIN 600 MG: 600 TABLET, EXTENDED RELEASE ORAL at 22:11

## 2023-11-13 RX ADMIN — SODIUM CHLORIDE, PRESERVATIVE FREE 10 ML: 5 INJECTION INTRAVENOUS at 07:45

## 2023-11-13 ASSESSMENT — PAIN SCALES - GENERAL
PAINLEVEL_OUTOF10: 0
PAINLEVEL_OUTOF10: 2

## 2023-11-13 ASSESSMENT — ENCOUNTER SYMPTOMS
GASTROINTESTINAL NEGATIVE: 1
COUGH: 1
SHORTNESS OF BREATH: 1
WHEEZING: 1

## 2023-11-13 ASSESSMENT — PAIN DESCRIPTION - DESCRIPTORS: DESCRIPTORS: ACHING

## 2023-11-13 ASSESSMENT — PAIN - FUNCTIONAL ASSESSMENT: PAIN_FUNCTIONAL_ASSESSMENT: ACTIVITIES ARE NOT PREVENTED

## 2023-11-13 ASSESSMENT — PAIN DESCRIPTION - LOCATION: LOCATION: HEAD

## 2023-11-13 NOTE — PROGRESS NOTES
Warfarin dosing per pharmacist    Bea Otero is a 82 y.o. female. Lab Results   Component Value Date/Time    INR 1.1 11/13/2023 02:56 AM       Indication:  Afib    Goal INR:  2 - 3    Home dose:  new start    Risk factors or significant drug interactions:  Levothyroxine, Methylprednisolone, Prednisone (may enhance the AC effect of Coumadin) and Traodone (may diminish the AC effect of Coumadin)    Other anticoagulants:  Lovenox (bridge)    Daily Monitoring  Date  INR     Warfarin dose HGB              Notes  11/12/23 1.0  5 mg  ---    11/13/23          1.1                  (5 mg)      New start of Coumadin for Afib. INR is sub-therapeutic. Continue Coumadin. Daily INRs ordered. Pharmacy will follow and adjust doses as indicated.       Thank you,    Saul Antonio, PharmD

## 2023-11-13 NOTE — PROGRESS NOTES
Patient HR dropping to 65 and appearing to go in and out of 2nd degree HB. This RN attempting to get EKG to capture episode when patient HR jumpted to 180, patient reported chest pain and increased SOB with episode. Again was able to capture episode on monitor, but EKG option not working properly at this time. Maintenance order entered. Dr Kolby Iqbal and Dr Rosina Willingham notified through [employer]. Patient re-started on cardizem gtt at this time at 5 per order. Patient HR decreased from 180 to 87. Dr Kolby Iqbal came to bedside to evaluate patient.  Will cont to monitor cardiac status Ear Star Wedge Flap Text: The defect edges were debeveled with a #15 blade scalpel.  Given the location of the defect and the proximity to free margins (helical rim) an ear star wedge flap was deemed most appropriate.  Using a sterile surgical marker, the appropriate flap was drawn incorporating the defect and placing the expected incisions between the helical rim and antihelix where possible.  The area thus outlined was incised through and through with a #15 scalpel blade.

## 2023-11-13 NOTE — PLAN OF CARE
Problem: Respiratory - Adult  Goal: Achieves optimal ventilation and oxygenation  11/12/2023 1939 by Taya Shetty RCP  Outcome: Progressing  Flowsheets (Taken 11/12/2023 1939)  Achieves optimal ventilation and oxygenation:   Assess for changes in respiratory status   Assess for changes in mentation and behavior   Position to facilitate oxygenation and minimize respiratory effort   Oxygen supplementation based on oxygen saturation or arterial blood gases   Initiate smoking cessation protocol as indicated   Encourage broncho-pulmonary hygiene including cough, deep breathe, incentive spirometry   Assess the need for suctioning and aspirate as needed   Assess and instruct to report shortness of breath or any respiratory difficulty  11/12/2023 1148 by Romina Kim RN  Outcome: Progressing  11/12/2023 0827 by Edilberto Headley RCP  Outcome: Progressing  Flowsheets (Taken 11/12/2023 0745 by Romina Kim, RN)  Achieves optimal ventilation and oxygenation:   Assess for changes in respiratory status   Assess for changes in mentation and behavior   Position to facilitate oxygenation and minimize respiratory effort   Oxygen supplementation based on oxygen saturation or arterial blood gases   Assess the need for suctioning and aspirate as needed   Assess and instruct to report shortness of breath or any respiratory difficulty   Respiratory therapy support as indicated

## 2023-11-13 NOTE — CARE COORDINATION
Discharge planning was discussed with the Critical Care Interdisciplinary Team. Discharge planning is pending the patient's clinical course in the hospital.

## 2023-11-13 NOTE — PROGRESS NOTES
.Physical Therapy Note:    Attempted to see patient this PM for physical therapy treatment  session. Patient is having a high heart rate. Nurse said hold today, until cardio see her. . Will follow and re-attempt as schedule permits/patient available.  Thank you,    Kelly Chawla, JORGE     Rehab Caseload Tracker

## 2023-11-13 NOTE — PROGRESS NOTES
AIRVO settings increased from [address] / 50% to 60L / 80%       11/13/23 2944   Oxygen Therapy/Pulse Ox   O2 Device Heated high flow cannula   O2 Flow Rate (L/min) 60 L/min   FiO2  80 %   Pulse 83   SpO2 94 %

## 2023-11-13 NOTE — PROGRESS NOTES
FiO2 increased from 60% to 80%:       11/13/23 0807   Oxygen Therapy/Pulse Ox   O2 Device Heated high flow cannula   O2 Flow Rate (L/min) 60 L/min   FiO2  80 %   Pulse 92   Respirations 26   SpO2 93 %

## 2023-11-13 NOTE — PROGRESS NOTES
Patient HR jumped to 185 despite cardizem gtt at 5, patient reported cp and SOB. Cardizem increased from 5 to 7.5 per titration orders. Episode lasted approx 3 min. Dr Azael Calvo notified of episode. Patient /55. Will cont to monitor cardiac status.

## 2023-11-13 NOTE — PROGRESS NOTES
Patient's AIRVO weaned from 60L/80% to 50L/50%:    Patient tolerating well at this time - will continue to monitor closely.        11/13/23 1057   Oxygen Therapy/Pulse Ox   O2 Device Heated high flow cannula   O2 Flow Rate (L/min) 50 L/min   FiO2  50 %   Pulse 67   Respirations 19   SpO2 92 %

## 2023-11-13 NOTE — PLAN OF CARE
Problem: Discharge Planning  Goal: Discharge to home or other facility with appropriate resources  11/13/2023 1105 by Chely Pressley RN  Outcome: Progressing  Flowsheets (Taken 11/13/2023 3576)  Discharge to home or other facility with appropriate resources:   Identify barriers to discharge with patient and caregiver   Arrange for needed discharge resources and transportation as appropriate   Identify discharge learning needs (meds, wound care, etc)   Refer to discharge planning if patient needs post-hospital services based on physician order or complex needs related to functional status, cognitive ability or social support system  11/13/2023 0732 by Arun Penn RN  Outcome: Progressing  Flowsheets (Taken 11/13/2023 0700)  Discharge to home or other facility with appropriate resources: Identify barriers to discharge with patient and caregiver     Problem: Pain  Goal: Verbalizes/displays adequate comfort level or baseline comfort level  11/13/2023 1105 by Chely Pressley RN  Outcome: Progressing  11/13/2023 0732 by Arun Penn RN  Outcome: Progressing  Flowsheets (Taken 11/13/2023 1272)  Verbalizes/displays adequate comfort level or baseline comfort level: Encourage patient to monitor pain and request assistance     Problem: Skin/Tissue Integrity  Goal: Absence of new skin breakdown  Description: 1. Monitor for areas of redness and/or skin breakdown  2. Assess vascular access sites hourly  3. Every 4-6 hours minimum:  Change oxygen saturation probe site  4. Every 4-6 hours:  If on nasal continuous positive airway pressure, respiratory therapy assess nares and determine need for appliance change or resting period.   11/13/2023 1105 by Chely Pressley RN  Outcome: Progressing  11/13/2023 0732 by Arun Penn RN  Outcome: Progressing     Problem: Respiratory - Adult  Goal: Achieves optimal ventilation and oxygenation  11/13/2023 1105 by Chely Pressley RN  Outcome: Progressing  Flowsheets (Taken

## 2023-11-14 ENCOUNTER — APPOINTMENT (OUTPATIENT)
Dept: GENERAL RADIOLOGY | Age: 83
DRG: 189 | End: 2023-11-14
Attending: HOSPITALIST
Payer: MEDICARE

## 2023-11-14 ENCOUNTER — HOSPITAL ENCOUNTER (INPATIENT)
Age: 83
LOS: 7 days | Discharge: HOME | DRG: 189 | End: 2023-11-21
Attending: HOSPITALIST | Admitting: STUDENT IN AN ORGANIZED HEALTH CARE EDUCATION/TRAINING PROGRAM
Payer: MEDICARE

## 2023-11-14 VITALS
HEART RATE: 77 BPM | BODY MASS INDEX: 36.57 KG/M2 | DIASTOLIC BLOOD PRESSURE: 59 MMHG | WEIGHT: 186.3 LBS | SYSTOLIC BLOOD PRESSURE: 118 MMHG | OXYGEN SATURATION: 93 % | HEIGHT: 60 IN | TEMPERATURE: 98 F | RESPIRATION RATE: 22 BRPM

## 2023-11-14 DIAGNOSIS — I38 ENDOCARDITIS: ICD-10-CM

## 2023-11-14 DIAGNOSIS — I51.89 RIGHT ATRIAL MASS: Primary | ICD-10-CM

## 2023-11-14 LAB
ALBUMIN SERPL-MCNC: 3 G/DL (ref 3.2–4.6)
ALBUMIN/GLOB SERPL: 1.3 (ref 0.4–1.6)
ALP SERPL-CCNC: 55 U/L (ref 50–136)
ALT SERPL-CCNC: 20 U/L (ref 12–65)
ANION GAP SERPL CALC-SCNC: 6 MMOL/L (ref 2–11)
AST SERPL-CCNC: 13 U/L (ref 15–37)
BILIRUB SERPL-MCNC: 0.3 MG/DL (ref 0.2–1.1)
BUN SERPL-MCNC: 30 MG/DL (ref 8–23)
CALCIUM SERPL-MCNC: 8.9 MG/DL (ref 8.3–10.4)
CHLORIDE SERPL-SCNC: 107 MMOL/L (ref 101–110)
CO2 SERPL-SCNC: 29 MMOL/L (ref 21–32)
CREAT SERPL-MCNC: 0.76 MG/DL (ref 0.6–1)
ERYTHROCYTE [DISTWIDTH] IN BLOOD BY AUTOMATED COUNT: 15.8 % (ref 11.9–14.6)
FLUID CULTURE: NORMAL
GLOBULIN SER CALC-MCNC: 2.4 G/DL (ref 2.8–4.5)
GLUCOSE SERPL-MCNC: 179 MG/DL (ref 65–100)
HCT VFR BLD AUTO: 39.7 % (ref 35.8–46.3)
HGB BLD-MCNC: 12.5 G/DL (ref 11.7–15.4)
INR PPP: 1.4
L PNEUMO1 AG UR QL IA: NEGATIVE
Lab: NORMAL
M PNEUMO IGG SER IA-ACNC: 431 U/ML (ref 0–99)
M PNEUMO IGM SER IA-ACNC: <770 U/ML (ref 0–769)
MCH RBC QN AUTO: 30.6 PG (ref 26.1–32.9)
MCHC RBC AUTO-ENTMCNC: 31.5 G/DL (ref 31.4–35)
MCV RBC AUTO: 97.3 FL (ref 82–102)
NRBC # BLD: 0 K/UL (ref 0–0.2)
NT PRO BNP: 1183 PG/ML
ORGANISM ID: NORMAL
PHOSPHATE SERPL-MCNC: 3.2 MG/DL (ref 2.3–3.7)
PLATELET # BLD AUTO: 203 K/UL (ref 150–450)
PMV BLD AUTO: 10.1 FL (ref 9.4–12.3)
POTASSIUM SERPL-SCNC: 3.7 MMOL/L (ref 3.5–5.1)
PROCALCITONIN SERPL-MCNC: <0.05 NG/ML (ref 0–0.49)
PROT SERPL-MCNC: 5.4 G/DL (ref 6.3–8.2)
PROTHROMBIN TIME: 17.4 SEC (ref 12.6–14.3)
RBC # BLD AUTO: 4.08 M/UL (ref 4.05–5.2)
S PNEUM AG SPEC QL LA: NEGATIVE
SODIUM SERPL-SCNC: 142 MMOL/L (ref 133–143)
SPECIMEN SOURCE: NORMAL
SPECIMEN SOURCE: NORMAL
SPECIMEN: NORMAL
WBC # BLD AUTO: 8.8 K/UL (ref 4.3–11.1)

## 2023-11-14 PROCEDURE — 6370000000 HC RX 637 (ALT 250 FOR IP): Performed by: FAMILY MEDICINE

## 2023-11-14 PROCEDURE — 2580000003 HC RX 258: Performed by: NURSE PRACTITIONER

## 2023-11-14 PROCEDURE — 2500000003 HC RX 250 WO HCPCS: Performed by: FAMILY MEDICINE

## 2023-11-14 PROCEDURE — 85027 COMPLETE CBC AUTOMATED: CPT

## 2023-11-14 PROCEDURE — 84100 ASSAY OF PHOSPHORUS: CPT

## 2023-11-14 PROCEDURE — 6360000002 HC RX W HCPCS: Performed by: FAMILY MEDICINE

## 2023-11-14 PROCEDURE — 85610 PROTHROMBIN TIME: CPT

## 2023-11-14 PROCEDURE — 99232 SBSQ HOSP IP/OBS MODERATE 35: CPT | Performed by: INTERNAL MEDICINE

## 2023-11-14 PROCEDURE — 6360000002 HC RX W HCPCS: Performed by: NURSE PRACTITIONER

## 2023-11-14 PROCEDURE — 87070 CULTURE OTHR SPECIMN AEROBIC: CPT

## 2023-11-14 PROCEDURE — 6360000002 HC RX W HCPCS: Performed by: INTERNAL MEDICINE

## 2023-11-14 PROCEDURE — 2700000000 HC OXYGEN THERAPY PER DAY

## 2023-11-14 PROCEDURE — 83880 ASSAY OF NATRIURETIC PEPTIDE: CPT

## 2023-11-14 PROCEDURE — 94761 N-INVAS EAR/PLS OXIMETRY MLT: CPT

## 2023-11-14 PROCEDURE — 2580000003 HC RX 258: Performed by: FAMILY MEDICINE

## 2023-11-14 PROCEDURE — 84145 PROCALCITONIN (PCT): CPT

## 2023-11-14 PROCEDURE — 99223 1ST HOSP IP/OBS HIGH 75: CPT | Performed by: INTERNAL MEDICINE

## 2023-11-14 PROCEDURE — 36415 COLL VENOUS BLD VENIPUNCTURE: CPT

## 2023-11-14 PROCEDURE — 87205 SMEAR GRAM STAIN: CPT

## 2023-11-14 PROCEDURE — 71045 X-RAY EXAM CHEST 1 VIEW: CPT

## 2023-11-14 PROCEDURE — 94640 AIRWAY INHALATION TREATMENT: CPT

## 2023-11-14 PROCEDURE — 94760 N-INVAS EAR/PLS OXIMETRY 1: CPT

## 2023-11-14 PROCEDURE — 80053 COMPREHEN METABOLIC PANEL: CPT

## 2023-11-14 PROCEDURE — 2140000000 HC CCU INTERMEDIATE R&B

## 2023-11-14 RX ORDER — ALBUTEROL SULFATE 2.5 MG/3ML
2.5 SOLUTION RESPIRATORY (INHALATION)
Status: CANCELLED | OUTPATIENT
Start: 2023-11-14

## 2023-11-14 RX ORDER — ONDANSETRON 2 MG/ML
4 INJECTION INTRAMUSCULAR; INTRAVENOUS EVERY 6 HOURS PRN
Status: CANCELLED | OUTPATIENT
Start: 2023-11-14

## 2023-11-14 RX ORDER — ACETAMINOPHEN 325 MG/1
650 TABLET ORAL EVERY 6 HOURS PRN
Status: DISCONTINUED | OUTPATIENT
Start: 2023-11-14 | End: 2023-11-21 | Stop reason: HOSPADM

## 2023-11-14 RX ORDER — GABAPENTIN 400 MG/1
400 CAPSULE ORAL NIGHTLY
Status: CANCELLED | OUTPATIENT
Start: 2023-11-14

## 2023-11-14 RX ORDER — ONDANSETRON 4 MG/1
4 TABLET, ORALLY DISINTEGRATING ORAL EVERY 8 HOURS PRN
Status: DISCONTINUED | OUTPATIENT
Start: 2023-11-14 | End: 2023-11-21 | Stop reason: HOSPADM

## 2023-11-14 RX ORDER — SODIUM CHLORIDE FOR INHALATION 3 %
4 VIAL, NEBULIZER (ML) INHALATION
Status: DISCONTINUED | OUTPATIENT
Start: 2023-11-14 | End: 2023-11-14

## 2023-11-14 RX ORDER — BUDESONIDE 0.5 MG/2ML
0.5 INHALANT ORAL
Status: DISCONTINUED | OUTPATIENT
Start: 2023-11-14 | End: 2023-11-21 | Stop reason: HOSPADM

## 2023-11-14 RX ORDER — LANOLIN ALCOHOL/MO/W.PET/CERES
3 CREAM (GRAM) TOPICAL NIGHTLY
Status: DISCONTINUED | OUTPATIENT
Start: 2023-11-14 | End: 2023-11-21 | Stop reason: HOSPADM

## 2023-11-14 RX ORDER — FUROSEMIDE 20 MG/1
20 TABLET ORAL DAILY
Status: DISCONTINUED | OUTPATIENT
Start: 2023-11-14 | End: 2023-11-21 | Stop reason: HOSPADM

## 2023-11-14 RX ORDER — SODIUM CHLORIDE 0.9 % (FLUSH) 0.9 %
5-40 SYRINGE (ML) INJECTION EVERY 12 HOURS SCHEDULED
Status: CANCELLED | OUTPATIENT
Start: 2023-11-14

## 2023-11-14 RX ORDER — DILTIAZEM HYDROCHLORIDE 240 MG/1
240 CAPSULE, COATED, EXTENDED RELEASE ORAL DAILY
Status: DISCONTINUED | OUTPATIENT
Start: 2023-11-14 | End: 2023-11-21 | Stop reason: HOSPADM

## 2023-11-14 RX ORDER — BUDESONIDE 0.5 MG/2ML
0.5 INHALANT ORAL
Status: DISCONTINUED | OUTPATIENT
Start: 2023-11-14 | End: 2023-11-14 | Stop reason: SDUPTHER

## 2023-11-14 RX ORDER — FUROSEMIDE 40 MG/1
20 TABLET ORAL DAILY
Status: CANCELLED | OUTPATIENT
Start: 2023-11-14

## 2023-11-14 RX ORDER — ACETAMINOPHEN 325 MG/1
650 TABLET ORAL EVERY 6 HOURS PRN
Status: CANCELLED | OUTPATIENT
Start: 2023-11-14

## 2023-11-14 RX ORDER — POLYETHYLENE GLYCOL 3350 17 G/17G
17 POWDER, FOR SOLUTION ORAL DAILY PRN
Status: DISCONTINUED | OUTPATIENT
Start: 2023-11-14 | End: 2023-11-21 | Stop reason: HOSPADM

## 2023-11-14 RX ORDER — GUAIFENESIN 600 MG/1
600 TABLET, EXTENDED RELEASE ORAL 2 TIMES DAILY
Status: CANCELLED | OUTPATIENT
Start: 2023-11-14

## 2023-11-14 RX ORDER — ONDANSETRON 4 MG/1
4 TABLET, ORALLY DISINTEGRATING ORAL EVERY 8 HOURS PRN
Status: CANCELLED | OUTPATIENT
Start: 2023-11-14

## 2023-11-14 RX ORDER — GABAPENTIN 400 MG/1
400 CAPSULE ORAL NIGHTLY
Status: DISCONTINUED | OUTPATIENT
Start: 2023-11-14 | End: 2023-11-21 | Stop reason: HOSPADM

## 2023-11-14 RX ORDER — ALBUTEROL SULFATE 2.5 MG/3ML
2.5 SOLUTION RESPIRATORY (INHALATION)
Status: DISCONTINUED | OUTPATIENT
Start: 2023-11-14 | End: 2023-11-21 | Stop reason: HOSPADM

## 2023-11-14 RX ORDER — ACETAMINOPHEN 650 MG/1
650 SUPPOSITORY RECTAL EVERY 6 HOURS PRN
Status: CANCELLED | OUTPATIENT
Start: 2023-11-14

## 2023-11-14 RX ORDER — SODIUM CHLORIDE FOR INHALATION 3 %
4 VIAL, NEBULIZER (ML) INHALATION 2 TIMES DAILY
Status: CANCELLED | OUTPATIENT
Start: 2023-11-14

## 2023-11-14 RX ORDER — TRAZODONE HYDROCHLORIDE 50 MG/1
50 TABLET ORAL NIGHTLY
Status: CANCELLED | OUTPATIENT
Start: 2023-11-14

## 2023-11-14 RX ORDER — LEVALBUTEROL INHALATION SOLUTION 0.63 MG/3ML
0.63 SOLUTION RESPIRATORY (INHALATION)
Status: CANCELLED | OUTPATIENT
Start: 2023-11-14

## 2023-11-14 RX ORDER — SODIUM CHLORIDE FOR INHALATION 3 %
4 VIAL, NEBULIZER (ML) INHALATION
Status: DISCONTINUED | OUTPATIENT
Start: 2023-11-14 | End: 2023-11-21 | Stop reason: HOSPADM

## 2023-11-14 RX ORDER — ONDANSETRON 2 MG/ML
4 INJECTION INTRAMUSCULAR; INTRAVENOUS EVERY 6 HOURS PRN
Status: DISCONTINUED | OUTPATIENT
Start: 2023-11-14 | End: 2023-11-21 | Stop reason: HOSPADM

## 2023-11-14 RX ORDER — SODIUM CHLORIDE 0.9 % (FLUSH) 0.9 %
5-40 SYRINGE (ML) INJECTION PRN
Status: CANCELLED | OUTPATIENT
Start: 2023-11-14

## 2023-11-14 RX ORDER — TRAZODONE HYDROCHLORIDE 50 MG/1
50 TABLET ORAL NIGHTLY
Status: DISCONTINUED | OUTPATIENT
Start: 2023-11-14 | End: 2023-11-21 | Stop reason: HOSPADM

## 2023-11-14 RX ORDER — WARFARIN SODIUM 5 MG/1
5 TABLET ORAL DAILY
Status: CANCELLED | OUTPATIENT
Start: 2023-11-14

## 2023-11-14 RX ORDER — GUAIFENESIN 600 MG/1
600 TABLET, EXTENDED RELEASE ORAL 2 TIMES DAILY
Status: DISCONTINUED | OUTPATIENT
Start: 2023-11-14 | End: 2023-11-21 | Stop reason: HOSPADM

## 2023-11-14 RX ORDER — LEVALBUTEROL INHALATION SOLUTION 0.63 MG/3ML
0.63 SOLUTION RESPIRATORY (INHALATION)
Status: DISCONTINUED | OUTPATIENT
Start: 2023-11-14 | End: 2023-11-17

## 2023-11-14 RX ORDER — SODIUM CHLORIDE 0.9 % (FLUSH) 0.9 %
5-40 SYRINGE (ML) INJECTION PRN
Status: DISCONTINUED | OUTPATIENT
Start: 2023-11-14 | End: 2023-11-21 | Stop reason: HOSPADM

## 2023-11-14 RX ORDER — WARFARIN SODIUM 5 MG/1
5 TABLET ORAL DAILY
Status: DISCONTINUED | OUTPATIENT
Start: 2023-11-14 | End: 2023-11-16

## 2023-11-14 RX ORDER — SODIUM CHLORIDE 0.9 % (FLUSH) 0.9 %
5-40 SYRINGE (ML) INJECTION EVERY 12 HOURS SCHEDULED
Status: DISCONTINUED | OUTPATIENT
Start: 2023-11-14 | End: 2023-11-21 | Stop reason: HOSPADM

## 2023-11-14 RX ORDER — POLYETHYLENE GLYCOL 3350 17 G/17G
17 POWDER, FOR SOLUTION ORAL DAILY PRN
Status: CANCELLED | OUTPATIENT
Start: 2023-11-14

## 2023-11-14 RX ORDER — SODIUM CHLORIDE 9 MG/ML
INJECTION, SOLUTION INTRAVENOUS PRN
Status: CANCELLED | OUTPATIENT
Start: 2023-11-14

## 2023-11-14 RX ORDER — DILTIAZEM HYDROCHLORIDE 240 MG/1
240 CAPSULE, COATED, EXTENDED RELEASE ORAL DAILY
Status: CANCELLED | OUTPATIENT
Start: 2023-11-14

## 2023-11-14 RX ORDER — ENOXAPARIN SODIUM 100 MG/ML
80 INJECTION SUBCUTANEOUS 2 TIMES DAILY
Status: DISCONTINUED | OUTPATIENT
Start: 2023-11-14 | End: 2023-11-16

## 2023-11-14 RX ORDER — SODIUM CHLORIDE 9 MG/ML
INJECTION, SOLUTION INTRAVENOUS PRN
Status: DISCONTINUED | OUTPATIENT
Start: 2023-11-14 | End: 2023-11-21 | Stop reason: HOSPADM

## 2023-11-14 RX ORDER — LANOLIN ALCOHOL/MO/W.PET/CERES
3 CREAM (GRAM) TOPICAL NIGHTLY
Status: CANCELLED | OUTPATIENT
Start: 2023-11-14

## 2023-11-14 RX ORDER — ENOXAPARIN SODIUM 100 MG/ML
80 INJECTION SUBCUTANEOUS 2 TIMES DAILY
Status: CANCELLED | OUTPATIENT
Start: 2023-11-14

## 2023-11-14 RX ADMIN — SODIUM CHLORIDE SOLN NEBU 3% 4 ML: 3 NEBU SOLN at 19:25

## 2023-11-14 RX ADMIN — LEVALBUTEROL HYDROCHLORIDE 0.63 MG: 0.63 SOLUTION RESPIRATORY (INHALATION) at 15:35

## 2023-11-14 RX ADMIN — DILTIAZEM HYDROCHLORIDE 240 MG: 240 CAPSULE, COATED, EXTENDED RELEASE ORAL at 12:56

## 2023-11-14 RX ADMIN — ENOXAPARIN SODIUM 80 MG: 100 INJECTION SUBCUTANEOUS at 12:37

## 2023-11-14 RX ADMIN — LEVOTHYROXINE SODIUM 137 MCG: 0.11 TABLET ORAL at 08:02

## 2023-11-14 RX ADMIN — SODIUM CHLORIDE SOLN NEBU 3% 4 ML: 3 NEBU SOLN at 12:54

## 2023-11-14 RX ADMIN — BUDESONIDE INHALATION 500 MCG: 0.5 SUSPENSION RESPIRATORY (INHALATION) at 12:59

## 2023-11-14 RX ADMIN — FUROSEMIDE 20 MG: 20 TABLET ORAL at 12:39

## 2023-11-14 RX ADMIN — GABAPENTIN 400 MG: 400 CAPSULE ORAL at 20:31

## 2023-11-14 RX ADMIN — GUAIFENESIN 600 MG: 600 TABLET ORAL at 20:31

## 2023-11-14 RX ADMIN — SODIUM CHLORIDE, PRESERVATIVE FREE 5 ML: 5 INJECTION INTRAVENOUS at 20:33

## 2023-11-14 RX ADMIN — LEVALBUTEROL HYDROCHLORIDE 0.63 MG: 0.63 SOLUTION RESPIRATORY (INHALATION) at 23:45

## 2023-11-14 RX ADMIN — SODIUM CHLORIDE 5 MG/HR: 900 INJECTION, SOLUTION INTRAVENOUS at 12:58

## 2023-11-14 RX ADMIN — WATER 40 MG: 1 INJECTION INTRAMUSCULAR; INTRAVENOUS; SUBCUTANEOUS at 08:04

## 2023-11-14 RX ADMIN — LEVALBUTEROL HYDROCHLORIDE 0.63 MG: 0.63 SOLUTION RESPIRATORY (INHALATION) at 19:25

## 2023-11-14 RX ADMIN — WARFARIN SODIUM 5 MG: 5 TABLET ORAL at 18:02

## 2023-11-14 RX ADMIN — Medication 3 MG: at 22:20

## 2023-11-14 RX ADMIN — AZITHROMYCIN MONOHYDRATE 500 MG: 500 INJECTION, POWDER, LYOPHILIZED, FOR SOLUTION INTRAVENOUS at 12:55

## 2023-11-14 RX ADMIN — TRAZODONE HYDROCHLORIDE 50 MG: 50 TABLET ORAL at 22:20

## 2023-11-14 RX ADMIN — LEVALBUTEROL HYDROCHLORIDE 0.63 MG: 0.63 SOLUTION RESPIRATORY (INHALATION) at 04:53

## 2023-11-14 RX ADMIN — ENOXAPARIN SODIUM 80 MG: 100 INJECTION SUBCUTANEOUS at 22:20

## 2023-11-14 RX ADMIN — GUAIFENESIN 600 MG: 600 TABLET ORAL at 12:39

## 2023-11-14 RX ADMIN — WATER 40 MG: 1 INJECTION INTRAMUSCULAR; INTRAVENOUS; SUBCUTANEOUS at 17:16

## 2023-11-14 RX ADMIN — LEVALBUTEROL HYDROCHLORIDE 0.63 MG: 0.63 SOLUTION RESPIRATORY (INHALATION) at 12:53

## 2023-11-14 RX ADMIN — LEVALBUTEROL HYDROCHLORIDE 0.63 MG: 0.63 SOLUTION RESPIRATORY (INHALATION) at 00:25

## 2023-11-14 RX ADMIN — BUDESONIDE INHALATION 500 MCG: 0.5 SUSPENSION RESPIRATORY (INHALATION) at 19:25

## 2023-11-14 RX ADMIN — SODIUM CHLORIDE, PRESERVATIVE FREE 10 ML: 5 INJECTION INTRAVENOUS at 12:59

## 2023-11-14 ASSESSMENT — PAIN SCALES - GENERAL
PAINLEVEL_OUTOF10: 0

## 2023-11-14 NOTE — PLAN OF CARE
Problem: Discharge Planning  Goal: Discharge to home or other facility with appropriate resources  Outcome: Progressing  Flowsheets (Taken 11/13/2023 1915)  Discharge to home or other facility with appropriate resources:   Identify barriers to discharge with patient and caregiver   Arrange for needed discharge resources and transportation as appropriate   Identify discharge learning needs (meds, wound care, etc)   Arrange for interpreters to assist at discharge as needed   Refer to discharge planning if patient needs post-hospital services based on physician order or complex needs related to functional status, cognitive ability or social support system     Problem: Pain  Goal: Verbalizes/displays adequate comfort level or baseline comfort level  Outcome: Progressing  Flowsheets (Taken 11/13/2023 1915)  Verbalizes/displays adequate comfort level or baseline comfort level:   Encourage patient to monitor pain and request assistance   Assess pain using appropriate pain scale   Administer analgesics based on type and severity of pain and evaluate response   Implement non-pharmacological measures as appropriate and evaluate response   Consider cultural and social influences on pain and pain management   Notify Licensed Independent Practitioner if interventions unsuccessful or patient reports new pain     Problem: Skin/Tissue Integrity  Goal: Absence of new skin breakdown  Description: 1. Monitor for areas of redness and/or skin breakdown  2. Assess vascular access sites hourly  3. Every 4-6 hours minimum:  Change oxygen saturation probe site  4. Every 4-6 hours:  If on nasal continuous positive airway pressure, respiratory therapy assess nares and determine need for appliance change or resting period.   Outcome: Progressing     Problem: Respiratory - Adult  Goal: Achieves optimal ventilation and oxygenation  Outcome: Progressing     Problem: Cardiovascular - Adult  Goal: Maintains optimal cardiac output and hemodynamic stability  Outcome: Progressing  Flowsheets (Taken 11/13/2023 1915)  Maintains optimal cardiac output and hemodynamic stability: Monitor blood pressure and heart rate     Problem: Infection - Adult  Goal: Absence of infection at discharge  Outcome: Progressing  Flowsheets (Taken 11/13/2023 1915)  Absence of infection at discharge:   Assess and monitor for signs and symptoms of infection   Monitor lab/diagnostic results     Problem: Metabolic/Fluid and Electrolytes - Adult  Goal: Electrolytes maintained within normal limits  Outcome: Progressing  Flowsheets (Taken 11/13/2023 1915)  Electrolytes maintained within normal limits:   Monitor labs and assess patient for signs and symptoms of electrolyte imbalances   Administer electrolyte replacement as ordered  Goal: Hemodynamic stability and optimal renal function maintained  Outcome: Progressing  Flowsheets (Taken 11/13/2023 1915)  Hemodynamic stability and optimal renal function maintained:   Monitor labs and assess for signs and symptoms of volume excess or deficit   Monitor intake, output and patient weight     Problem: Safety - Adult  Goal: Free from fall injury  Outcome: Progressing

## 2023-11-14 NOTE — CONSULTS
History and Physical Initial Visit NOTE           11/14/2023    Ajit Appiah                        Date of Admission:  (Not on file)    The patient's chart is reviewed and the patient is discussed with the staff. Subjective:     Patient is a 82 y.o.  female seen and evaluated at the request of Dr. Jag West for asthma exacerbation with acute respiratory failure. She has been followed by us in the office for severe asthma, RY noncompliant with CPAP and morbid obesity. She was last seen November 2, 2023. Her previous spirometry reveals moderately severe obstruction and FeNO is elevated consistent with poorly controlled asthma. She was placed on Trelegy but could not afford it and tried on Advair but this was too expensive as well. Does not qualify for patient assistance due to her income. She wants to go back to Qvar twice daily and using albuterol as needed. Discussion that we may need to consider biologic in the future, but she wants to see how she does on the Qvar on a routine basis as cost may be an issue with biologic. She has been followed by the [facility] prior to her move here. Eosinophil count was 0.4 k/UL on 4/3/23. Biologics were recommended by her pulmonologist at the [facility], but at that time these were not covered by her insurance. She has CPAP but has not been wearing at home for years. On 11/11 she was admitted to [facility] for SOB with asthma exacerbation. She was in and out of A fib RVR. Changed to xopenex. She required multiple fluid boluses for hypotension and she has required more oxygen over the duration of her stay. Echocardiogram showed right atrial mass, unclear etiology and moderate to severe mitral stenosis. Cardiology was consulted Mri was ordered. CXR 11/12 showed no effusions, hyperinflated lungs, and large hiatal hernia. She was given IV magnesium, IV solumedrol and continued nebs.  She was transferred downtown and we were consulted

## 2023-11-14 NOTE — CARE COORDINATION
11/14/23 0842   Service Assessment   Support Systems Spouse/Significant Other   Prior Functional Level Independent in ADLs/IADLs   Financial Resources Medicare   Social/Functional History   Lives With Spouse   ADL Assistance Independent   Discharge Planning   Type of Residence Independent Living   Living Arrangements Spouse/Significant Other   Potential Assistance Needed Other (Comment)  ([address] [place] [address])   DME Ordered? No   Potential Assistance Purchasing Medications No   Patient expects to be discharged to: Other (comment)  (Acute Hospital)   [address] Discharge   Transition of Care Consult (CM Consult) Discharge Planning   Services [address] Discharge [address]     The patient is transferring to [address] [place] [address] for continued hospitalization.

## 2023-11-14 NOTE — PROGRESS NOTES
Hospitalist Progress Note   Admit Date:  2023  3:47 AM   Name:  Madelaine Patel   Age:  82 y.o. Sex:  female  :  1940   MRN:  [id number]   Room:  [facility]    Presenting/Chief Complaint: Shortness of Breath (/)     Reason(s) for Admission: Respiratory distress [R06.03]  Hypoxia [R09.02]  Elevated brain natriuretic peptide (BNP) level [R79.89]  Severe persistent asthma with acute exacerbation [J45.51]  Severe asthma with acute exacerbation, unspecified whether persistent [J45.901]     Hospital Course:   Madelaine Patel is a 82 y.o. female with medical history of asthma, hypertension, anemia who presented with severe shortness of breath admitted with acute respiratory failure secondary to severe persistent asthma with acute exacerbation. On nebs treatment, steroids. On Airvo. Pulmonology consulted . Echocardiogram showed right atrial mass, unclear etiology and moderate to severe mitral stenosis. Cardiac MRI ordered but unable to get it done due to patient's respiratory status. Hospital course complicated with A-fib RVR , required diltiazem drip for small period of time, converted back spontaneously to sinus rhythm. Heart rate fluctuates between 40s to 180s and required Cardizem drip again on  evening. Cardiology consulted and plan for SARA once pulmonary status improved. Patient started on warfarin with Lovenox bridging due to MS and A-fib. Subjective & 24hr Events:   Patient seen and examined at bedside. Remains with shortness of breath and expiratory wheezings. Also complaining of cough. Oxygen requirement increased and currently on Airvo 60 L / 80%. I will consult pulmonology. Heart rate fluctuates between 40s to 180s in last 24 hours, required Cardizem drip overnight. Apparently cardiology has seen patient and plan for SARA once pulmonary status improved. No cardiology note available at this time to review.     Given patient respiratory status and possible need for 55 50 - 136 U/L    Total Protein 5.4 (L) 6.3 - 8.2 g/dL    Albumin 3.0 (L) 3.2 - 4.6 g/dL    Globulin 2.4 (L) 2.8 - 4.5 g/dL    Albumin/Globulin Ratio 1.3 0.4 - 1.6     Phosphorus    Collection Time: 11/14/23  3:19 AM   Result Value Ref Range    Phosphorus 3.2 2.3 - 3.7 MG/DL   CBC    Collection Time: 11/14/23  3:19 AM   Result Value Ref Range    WBC 8.8 4.3 - 11.1 K/uL    RBC 4.08 4.05 - 5.2 M/uL    Hemoglobin 12.5 11.7 - 15.4 g/dL    Hematocrit 39.7 35.8 - 46.3 %    MCV 97.3 82.0 - 102.0 FL    MCH 30.6 26.1 - 32.9 PG    MCHC 31.5 31.4 - 35.0 g/dL    RDW 15.8 (H) 11.9 - 14.6 %    Platelets 126 714 - 294 K/uL    MPV 10.1 9.4 - 12.3 FL    nRBC 0.00 0.0 - 0.2 K/uL   Procalcitonin    Collection Time: 11/14/23  3:19 AM   Result Value Ref Range    Procalcitonin <0.05 0.00 - 0.49 ng/mL   Protime-INR    Collection Time: 11/14/23  3:19 AM   Result Value Ref Range    Protime 17.4 (H) 12.6 - 14.3 sec    INR 1.4         Current Meds:  No current facility-administered medications for this encounter. No current outpatient medications on file.      Facility-Administered Medications Ordered in Other Encounters   Medication Dose Route Frequency    levalbuterol (XOPENEX) nebulization 0.63 mg  0.63 mg Nebulization Q4H RT    methylPREDNISolone sodium succ (SOLU-MEDROL) 40 mg in sterile water 1 mL injection  40 mg IntraVENous Q8H    mometasone-formoterol (DULERA) 200-5 MCG/ACT inhaler 2 puff  2 puff Inhalation BID RT    budesonide (PULMICORT) nebulizer suspension 500 mcg  0.5 mg Nebulization BID RT    azithromycin (ZITHROMAX) 500 mg in sodium chloride 0.9 % 250 mL IVPB (Lnad4Abq)  500 mg IntraVENous Q24H    0.9 % sodium chloride infusion   IntraVENous PRN    acetaminophen (TYLENOL) tablet 650 mg  650 mg Oral Q6H PRN    Or    acetaminophen (TYLENOL) suppository 650 mg  650 mg Rectal Q6H PRN    albuterol (PROVENTIL) (2.5 MG/3ML) 0.083% nebulizer solution 2.5 mg  2.5 mg Nebulization Q2H PRN    dilTIAZem (CARDIZEM CD) extended release

## 2023-11-14 NOTE — PROGRESS NOTES
TRANSFER - OUT REPORT:    Verbal report given to RONY HANSEN on Milka Johnson  being transferred to [facility] for urgent transfer       Report consisted of patient's Situation, Background, Assessment and   Recommendations(SBAR). Information from the following report(s) Nurse Handoff Report, Intake/Output, MAR, Recent Results, and Cardiac Rhythm SR with PACs, PVCs & bigeminy  was reviewed with the receiving nurse. Lines:   Peripheral IV 11/11/23 Left;Proximal Forearm (Active)   Site Assessment Clean, dry & intact 11/14/23 0715   Line Status Flushed;Normal saline locked 11/14/23 0715   Line Care Connections checked and tightened 11/14/23 0715   Phlebitis Assessment No symptoms 11/14/23 0715   Infiltration Assessment 0 11/14/23 0715   Alcohol Cap Used No 11/14/23 0715   Dressing Status Dry; Intact 11/14/23 0715   Dressing Type Transparent 11/14/23 0715       Peripheral IV 11/11/23 Right Antecubital (Active)   Site Assessment Dry; Intact 11/14/23 0715   Line Status Normal saline locked 11/14/23 0715   Line Care Connections checked and tightened 11/14/23 0715   Phlebitis Assessment No symptoms 11/14/23 0715   Infiltration Assessment 0 11/14/23 0715   Alcohol Cap Used No 11/14/23 0715   Dressing Status Clean, dry & intact 11/14/23 0715   Dressing Type Transparent 11/14/23 0715       Peripheral IV 11/12/23 Right Wrist (Active)   Site Assessment Clean, dry & intact 11/14/23 0715   Line Status Normal saline locked 11/14/23 0715   Line Care Connections checked and tightened 11/14/23 0715   Phlebitis Assessment No symptoms 11/14/23 0715   Infiltration Assessment 0 11/14/23 0715   Alcohol Cap Used No 11/14/23 0715   Dressing Status Clean, dry & intact 11/14/23 0715   Dressing Type Transparent 11/14/23 0715   Dressing Intervention New 11/12/23 0315        Opportunity for questions and clarification was provided.       Patient transported with:  Monitor and O2 @ 60lpm  Patient transfering with [employer] medics at this time

## 2023-11-14 NOTE — H&P
Patient is transferred to [address] from [facility] for further management and care. Please see today's progress note for the details.

## 2023-11-14 NOTE — DISCHARGE SUMMARY
Patient is being transferred to [address] for further management and care. Please see today's progress note for the details.

## 2023-11-15 PROBLEM — J45.41 MODERATE PERSISTENT ASTHMA WITH EXACERBATION: Status: ACTIVE | Noted: 2023-11-15

## 2023-11-15 PROBLEM — I48.91 ATRIAL FIBRILLATION (HCC): Status: ACTIVE | Noted: 2023-11-15

## 2023-11-15 PROBLEM — J96.90 RESPIRATORY FAILURE (HCC): Status: ACTIVE | Noted: 2023-11-15

## 2023-11-15 LAB
INR PPP: 1.6
PROTHROMBIN TIME: 19.9 SEC (ref 12.6–14.3)

## 2023-11-15 PROCEDURE — 2700000000 HC OXYGEN THERAPY PER DAY

## 2023-11-15 PROCEDURE — 6370000000 HC RX 637 (ALT 250 FOR IP): Performed by: FAMILY MEDICINE

## 2023-11-15 PROCEDURE — 97164 PT RE-EVAL EST PLAN CARE: CPT

## 2023-11-15 PROCEDURE — 99232 SBSQ HOSP IP/OBS MODERATE 35: CPT | Performed by: INTERNAL MEDICINE

## 2023-11-15 PROCEDURE — 94761 N-INVAS EAR/PLS OXIMETRY MLT: CPT

## 2023-11-15 PROCEDURE — 36415 COLL VENOUS BLD VENIPUNCTURE: CPT

## 2023-11-15 PROCEDURE — 6360000002 HC RX W HCPCS: Performed by: FAMILY MEDICINE

## 2023-11-15 PROCEDURE — 6360000002 HC RX W HCPCS: Performed by: NURSE PRACTITIONER

## 2023-11-15 PROCEDURE — 6360000002 HC RX W HCPCS: Performed by: INTERNAL MEDICINE

## 2023-11-15 PROCEDURE — 97530 THERAPEUTIC ACTIVITIES: CPT

## 2023-11-15 PROCEDURE — 2580000003 HC RX 258: Performed by: FAMILY MEDICINE

## 2023-11-15 PROCEDURE — 85610 PROTHROMBIN TIME: CPT

## 2023-11-15 PROCEDURE — 2580000003 HC RX 258: Performed by: NURSE PRACTITIONER

## 2023-11-15 PROCEDURE — 2140000000 HC CCU INTERMEDIATE R&B

## 2023-11-15 PROCEDURE — 94640 AIRWAY INHALATION TREATMENT: CPT

## 2023-11-15 RX ADMIN — GUAIFENESIN 600 MG: 600 TABLET ORAL at 20:50

## 2023-11-15 RX ADMIN — GUAIFENESIN 600 MG: 600 TABLET ORAL at 08:55

## 2023-11-15 RX ADMIN — LEVALBUTEROL HYDROCHLORIDE 0.63 MG: 0.63 SOLUTION RESPIRATORY (INHALATION) at 05:05

## 2023-11-15 RX ADMIN — DILTIAZEM HYDROCHLORIDE 240 MG: 240 CAPSULE, COATED, EXTENDED RELEASE ORAL at 08:55

## 2023-11-15 RX ADMIN — Medication 3 MG: at 22:15

## 2023-11-15 RX ADMIN — SODIUM CHLORIDE, PRESERVATIVE FREE 10 ML: 5 INJECTION INTRAVENOUS at 09:08

## 2023-11-15 RX ADMIN — BUDESONIDE INHALATION 500 MCG: 0.5 SUSPENSION RESPIRATORY (INHALATION) at 08:26

## 2023-11-15 RX ADMIN — LEVALBUTEROL HYDROCHLORIDE 0.63 MG: 0.63 SOLUTION RESPIRATORY (INHALATION) at 11:36

## 2023-11-15 RX ADMIN — SODIUM CHLORIDE, PRESERVATIVE FREE 10 ML: 5 INJECTION INTRAVENOUS at 20:51

## 2023-11-15 RX ADMIN — SODIUM CHLORIDE SOLN NEBU 3% 4 ML: 3 NEBU SOLN at 20:08

## 2023-11-15 RX ADMIN — GABAPENTIN 400 MG: 400 CAPSULE ORAL at 22:15

## 2023-11-15 RX ADMIN — TRAZODONE HYDROCHLORIDE 50 MG: 50 TABLET ORAL at 22:15

## 2023-11-15 RX ADMIN — ENOXAPARIN SODIUM 80 MG: 100 INJECTION SUBCUTANEOUS at 11:02

## 2023-11-15 RX ADMIN — WARFARIN SODIUM 5 MG: 5 TABLET ORAL at 17:20

## 2023-11-15 RX ADMIN — WATER 40 MG: 1 INJECTION INTRAMUSCULAR; INTRAVENOUS; SUBCUTANEOUS at 08:55

## 2023-11-15 RX ADMIN — SODIUM CHLORIDE SOLN NEBU 3% 4 ML: 3 NEBU SOLN at 08:26

## 2023-11-15 RX ADMIN — WATER 40 MG: 1 INJECTION INTRAMUSCULAR; INTRAVENOUS; SUBCUTANEOUS at 16:03

## 2023-11-15 RX ADMIN — AZITHROMYCIN MONOHYDRATE 500 MG: 500 INJECTION, POWDER, LYOPHILIZED, FOR SOLUTION INTRAVENOUS at 11:20

## 2023-11-15 RX ADMIN — FUROSEMIDE 20 MG: 20 TABLET ORAL at 08:55

## 2023-11-15 RX ADMIN — BUDESONIDE INHALATION 500 MCG: 0.5 SUSPENSION RESPIRATORY (INHALATION) at 20:08

## 2023-11-15 RX ADMIN — WATER 40 MG: 1 INJECTION INTRAMUSCULAR; INTRAVENOUS; SUBCUTANEOUS at 00:27

## 2023-11-15 RX ADMIN — LEVALBUTEROL HYDROCHLORIDE 0.63 MG: 0.63 SOLUTION RESPIRATORY (INHALATION) at 16:31

## 2023-11-15 RX ADMIN — LEVALBUTEROL HYDROCHLORIDE 0.63 MG: 0.63 SOLUTION RESPIRATORY (INHALATION) at 08:26

## 2023-11-15 RX ADMIN — LEVALBUTEROL HYDROCHLORIDE 0.63 MG: 0.63 SOLUTION RESPIRATORY (INHALATION) at 20:08

## 2023-11-15 ASSESSMENT — PAIN SCALES - GENERAL
PAINLEVEL_OUTOF10: 0

## 2023-11-15 NOTE — PLAN OF CARE
Problem: Discharge Planning  Goal: Discharge to home or other facility with appropriate resources  Outcome: Progressing     Problem: Safety - Adult  Goal: Free from fall injury  Outcome: Progressing     Problem: ABCDS Injury Assessment  Goal: Absence of physical injury  Outcome: Progressing     Problem: Respiratory - Adult  Goal: Achieves optimal ventilation and oxygenation  11/15/2023 1129 by Renate Boas, RN  Outcome: Progressing  11/15/2023 0830 by Barb Mchugh RCP  Outcome: Progressing  Flowsheets (Taken 11/15/2023 0830)  Achieves optimal ventilation and oxygenation:   Assess for changes in respiratory status   Position to facilitate oxygenation and minimize respiratory effort   Respiratory therapy support as indicated   Assess for changes in mentation and behavior   Oxygen supplementation based on oxygen saturation or arterial blood gases   Encourage broncho-pulmonary hygiene including cough, deep breathe, incentive spirometry   Assess and instruct to report shortness of breath or any respiratory difficulty

## 2023-11-15 NOTE — PLAN OF CARE
Problem: Respiratory - Adult  Goal: Achieves optimal ventilation and oxygenation  Outcome: Progressing  Flowsheets (Taken 11/15/2023 7230)  Achieves optimal ventilation and oxygenation:   Assess for changes in respiratory status   Position to facilitate oxygenation and minimize respiratory effort   Respiratory therapy support as indicated   Assess for changes in mentation and behavior   Oxygen supplementation based on oxygen saturation or arterial blood gases   Encourage broncho-pulmonary hygiene including cough, deep breathe, incentive spirometry   Assess and instruct to report shortness of breath or any respiratory difficulty

## 2023-11-15 NOTE — ICUWATCH
RRT Clinical Rounding Nurse Progress Report      SUBJECTIVE: Patient assessed secondary to RN/provider concern - Elevated DI score. Vitals:    11/15/23 0827 11/15/23 0846 11/15/23 1115 11/15/23 1136   BP:  (!) 105/53 (!) 115/59    Pulse: 68 75 66 71   Resp: 16  17 16   Temp:       TempSrc:       SpO2: 93% 92% 93% 94%   Weight:       Height:            DETERIORATION INDEX SCORE: 43    ASSESSMENT:  Pt awake in bed, oriented X4. On Airvo 60L 60%, lung fields with coarse wheezing bilaterally. States she feels like her breathing is improved. Pertinent labs, images, and notes reviewed. Discussed case with primary RN. PLAN:  Will discharge from RRT Clinical Rounding Program per protocol. Please call if needed.     Anupama Culp RN  [place]: [address]: [phone]

## 2023-11-16 LAB
ANION GAP SERPL CALC-SCNC: 7 MMOL/L (ref 2–11)
BUN SERPL-MCNC: 30 MG/DL (ref 8–23)
CALCIUM SERPL-MCNC: 8.8 MG/DL (ref 8.3–10.4)
CHLORIDE SERPL-SCNC: 108 MMOL/L (ref 101–110)
CO2 SERPL-SCNC: 27 MMOL/L (ref 21–32)
CREAT SERPL-MCNC: 0.7 MG/DL (ref 0.6–1)
ERYTHROCYTE [DISTWIDTH] IN BLOOD BY AUTOMATED COUNT: 15.4 % (ref 11.9–14.6)
GLUCOSE SERPL-MCNC: 178 MG/DL (ref 65–100)
HCT VFR BLD AUTO: 39.5 % (ref 35.8–46.3)
HGB BLD-MCNC: 13.1 G/DL (ref 11.7–15.4)
INR PPP: 2.1
MAGNESIUM SERPL-MCNC: 2.5 MG/DL (ref 1.8–2.4)
MCH RBC QN AUTO: 31.6 PG (ref 26.1–32.9)
MCHC RBC AUTO-ENTMCNC: 33.2 G/DL (ref 31.4–35)
MCV RBC AUTO: 95.4 FL (ref 82–102)
NRBC # BLD: 0.02 K/UL (ref 0–0.2)
PHOSPHATE SERPL-MCNC: 2.9 MG/DL (ref 2.3–3.7)
PLATELET # BLD AUTO: 183 K/UL (ref 150–450)
PMV BLD AUTO: 9.9 FL (ref 9.4–12.3)
POTASSIUM SERPL-SCNC: 3.3 MMOL/L (ref 3.5–5.1)
PROTHROMBIN TIME: 24.4 SEC (ref 12.6–14.3)
RBC # BLD AUTO: 4.14 M/UL (ref 4.05–5.2)
SODIUM SERPL-SCNC: 142 MMOL/L (ref 133–143)
WBC # BLD AUTO: 6.9 K/UL (ref 4.3–11.1)

## 2023-11-16 PROCEDURE — 6360000002 HC RX W HCPCS: Performed by: INTERNAL MEDICINE

## 2023-11-16 PROCEDURE — 6370000000 HC RX 637 (ALT 250 FOR IP): Performed by: FAMILY MEDICINE

## 2023-11-16 PROCEDURE — 36415 COLL VENOUS BLD VENIPUNCTURE: CPT

## 2023-11-16 PROCEDURE — 83735 ASSAY OF MAGNESIUM: CPT

## 2023-11-16 PROCEDURE — 85027 COMPLETE CBC AUTOMATED: CPT

## 2023-11-16 PROCEDURE — 85610 PROTHROMBIN TIME: CPT

## 2023-11-16 PROCEDURE — 6360000002 HC RX W HCPCS: Performed by: NURSE PRACTITIONER

## 2023-11-16 PROCEDURE — 97530 THERAPEUTIC ACTIVITIES: CPT

## 2023-11-16 PROCEDURE — 6360000002 HC RX W HCPCS: Performed by: FAMILY MEDICINE

## 2023-11-16 PROCEDURE — 99232 SBSQ HOSP IP/OBS MODERATE 35: CPT | Performed by: INTERNAL MEDICINE

## 2023-11-16 PROCEDURE — 2580000003 HC RX 258: Performed by: NURSE PRACTITIONER

## 2023-11-16 PROCEDURE — 2580000003 HC RX 258: Performed by: FAMILY MEDICINE

## 2023-11-16 PROCEDURE — 6370000000 HC RX 637 (ALT 250 FOR IP): Performed by: STUDENT IN AN ORGANIZED HEALTH CARE EDUCATION/TRAINING PROGRAM

## 2023-11-16 PROCEDURE — 2140000000 HC CCU INTERMEDIATE R&B

## 2023-11-16 PROCEDURE — 84100 ASSAY OF PHOSPHORUS: CPT

## 2023-11-16 PROCEDURE — 94761 N-INVAS EAR/PLS OXIMETRY MLT: CPT

## 2023-11-16 PROCEDURE — 80048 BASIC METABOLIC PNL TOTAL CA: CPT

## 2023-11-16 PROCEDURE — 2700000000 HC OXYGEN THERAPY PER DAY

## 2023-11-16 PROCEDURE — 76937 US GUIDE VASCULAR ACCESS: CPT

## 2023-11-16 PROCEDURE — 94640 AIRWAY INHALATION TREATMENT: CPT

## 2023-11-16 RX ORDER — POTASSIUM CHLORIDE 20 MEQ/1
40 TABLET, EXTENDED RELEASE ORAL ONCE
Status: COMPLETED | OUTPATIENT
Start: 2023-11-16 | End: 2023-11-16

## 2023-11-16 RX ORDER — WARFARIN SODIUM 2.5 MG/1
2.5 TABLET ORAL DAILY
Status: DISCONTINUED | OUTPATIENT
Start: 2023-11-16 | End: 2023-11-18

## 2023-11-16 RX ADMIN — METHYLPREDNISOLONE SODIUM SUCCINATE 60 MG: 125 INJECTION, POWDER, FOR SOLUTION INTRAMUSCULAR; INTRAVENOUS at 16:12

## 2023-11-16 RX ADMIN — DILTIAZEM HYDROCHLORIDE 240 MG: 240 CAPSULE, COATED, EXTENDED RELEASE ORAL at 08:02

## 2023-11-16 RX ADMIN — GUAIFENESIN 600 MG: 600 TABLET ORAL at 20:22

## 2023-11-16 RX ADMIN — BUDESONIDE INHALATION 500 MCG: 0.5 SUSPENSION RESPIRATORY (INHALATION) at 20:47

## 2023-11-16 RX ADMIN — SODIUM CHLORIDE SOLN NEBU 3% 4 ML: 3 NEBU SOLN at 07:15

## 2023-11-16 RX ADMIN — AZITHROMYCIN MONOHYDRATE 500 MG: 500 INJECTION, POWDER, LYOPHILIZED, FOR SOLUTION INTRAVENOUS at 12:09

## 2023-11-16 RX ADMIN — FUROSEMIDE 20 MG: 20 TABLET ORAL at 08:03

## 2023-11-16 RX ADMIN — ENOXAPARIN SODIUM 80 MG: 100 INJECTION SUBCUTANEOUS at 10:17

## 2023-11-16 RX ADMIN — TRAZODONE HYDROCHLORIDE 50 MG: 50 TABLET ORAL at 22:17

## 2023-11-16 RX ADMIN — POTASSIUM CHLORIDE 40 MEQ: 1500 TABLET, EXTENDED RELEASE ORAL at 08:02

## 2023-11-16 RX ADMIN — POLYETHYLENE GLYCOL 3350 17 G: 17 POWDER, FOR SOLUTION ORAL at 10:17

## 2023-11-16 RX ADMIN — LEVALBUTEROL HYDROCHLORIDE 0.63 MG: 0.63 SOLUTION RESPIRATORY (INHALATION) at 00:04

## 2023-11-16 RX ADMIN — SODIUM CHLORIDE, PRESERVATIVE FREE 10 ML: 5 INJECTION INTRAVENOUS at 08:00

## 2023-11-16 RX ADMIN — ENOXAPARIN SODIUM 80 MG: 100 INJECTION SUBCUTANEOUS at 00:21

## 2023-11-16 RX ADMIN — BUDESONIDE INHALATION 500 MCG: 0.5 SUSPENSION RESPIRATORY (INHALATION) at 07:15

## 2023-11-16 RX ADMIN — GUAIFENESIN 600 MG: 600 TABLET ORAL at 08:02

## 2023-11-16 RX ADMIN — LEVALBUTEROL HYDROCHLORIDE 0.63 MG: 0.63 SOLUTION RESPIRATORY (INHALATION) at 11:45

## 2023-11-16 RX ADMIN — SODIUM CHLORIDE, PRESERVATIVE FREE 10 ML: 5 INJECTION INTRAVENOUS at 20:22

## 2023-11-16 RX ADMIN — GABAPENTIN 400 MG: 400 CAPSULE ORAL at 22:18

## 2023-11-16 RX ADMIN — SODIUM CHLORIDE SOLN NEBU 3% 4 ML: 3 NEBU SOLN at 20:47

## 2023-11-16 RX ADMIN — LEVALBUTEROL HYDROCHLORIDE 0.63 MG: 0.63 SOLUTION RESPIRATORY (INHALATION) at 15:56

## 2023-11-16 RX ADMIN — WATER 40 MG: 1 INJECTION INTRAMUSCULAR; INTRAVENOUS; SUBCUTANEOUS at 08:00

## 2023-11-16 RX ADMIN — WATER 40 MG: 1 INJECTION INTRAMUSCULAR; INTRAVENOUS; SUBCUTANEOUS at 00:29

## 2023-11-16 RX ADMIN — WARFARIN SODIUM 2.5 MG: 2.5 TABLET ORAL at 17:35

## 2023-11-16 RX ADMIN — Medication 3 MG: at 22:18

## 2023-11-16 RX ADMIN — LEVALBUTEROL HYDROCHLORIDE 0.63 MG: 0.63 SOLUTION RESPIRATORY (INHALATION) at 20:47

## 2023-11-16 RX ADMIN — LEVALBUTEROL HYDROCHLORIDE 0.63 MG: 0.63 SOLUTION RESPIRATORY (INHALATION) at 07:15

## 2023-11-16 NOTE — PLAN OF CARE
Problem: Discharge Planning  Goal: Discharge to home or other facility with appropriate resources  Outcome: Progressing     Problem: Safety - Adult  Goal: Free from fall injury  Outcome: Progressing     Problem: ABCDS Injury Assessment  Goal: Absence of physical injury  Outcome: Progressing     Problem: Respiratory - Adult  Goal: Achieves optimal ventilation and oxygenation  11/16/2023 1051 by Nicolle Wadsworth RN  Outcome: Progressing  11/16/2023 0718 by Puma Sparks RCP  Outcome: Progressing  Flowsheets  Taken 11/16/2023 0718 by Puma Sparks RCP  Achieves optimal ventilation and oxygenation:   Assess for changes in respiratory status   Position to facilitate oxygenation and minimize respiratory effort   Respiratory therapy support as indicated   Oxygen supplementation based on oxygen saturation or arterial blood gases   Assess for changes in mentation and behavior   Encourage broncho-pulmonary hygiene including cough, deep breathe, incentive spirometry   Assess and instruct to report shortness of breath or any respiratory difficulty  Taken 11/15/2023 2050 by Ghazala Thorne RN  Achieves optimal ventilation and oxygenation:   Assess for changes in respiratory status   Position to facilitate oxygenation and minimize respiratory effort   Assess for changes in mentation and behavior   Oxygen supplementation based on oxygen saturation or arterial blood gases

## 2023-11-16 NOTE — PLAN OF CARE
Problem: Respiratory - Adult  Goal: Achieves optimal ventilation and oxygenation  Outcome: Progressing  Flowsheets  Taken 11/16/2023 0718 by Leilani Negron RCP  Achieves optimal ventilation and oxygenation:   Assess for changes in respiratory status   Position to facilitate oxygenation and minimize respiratory effort   Respiratory therapy support as indicated   Oxygen supplementation based on oxygen saturation or arterial blood gases   Assess for changes in mentation and behavior   Encourage broncho-pulmonary hygiene including cough, deep breathe, incentive spirometry   Assess and instruct to report shortness of breath or any respiratory difficulty

## 2023-11-17 LAB
ANION GAP SERPL CALC-SCNC: 5 MMOL/L (ref 2–11)
BACTERIA SPEC CULT: NORMAL
BUN SERPL-MCNC: 28 MG/DL (ref 8–23)
CALCIUM SERPL-MCNC: 9 MG/DL (ref 8.3–10.4)
CHLORIDE SERPL-SCNC: 107 MMOL/L (ref 101–110)
CO2 SERPL-SCNC: 28 MMOL/L (ref 21–32)
CREAT SERPL-MCNC: 0.7 MG/DL (ref 0.6–1)
ERYTHROCYTE [DISTWIDTH] IN BLOOD BY AUTOMATED COUNT: 15.5 % (ref 11.9–14.6)
GLUCOSE SERPL-MCNC: 184 MG/DL (ref 65–100)
GRAM STN SPEC: NORMAL
HCT VFR BLD AUTO: 41.9 % (ref 35.8–46.3)
HGB BLD-MCNC: 13.5 G/DL (ref 11.7–15.4)
INR PPP: 2.5
MCH RBC QN AUTO: 31 PG (ref 26.1–32.9)
MCHC RBC AUTO-ENTMCNC: 32.2 G/DL (ref 31.4–35)
MCV RBC AUTO: 96.3 FL (ref 82–102)
MM INDURATION, POC: 0 MM (ref 0–5)
NRBC # BLD: 0 K/UL (ref 0–0.2)
PHOSPHATE SERPL-MCNC: 2.8 MG/DL (ref 2.3–3.7)
PLATELET # BLD AUTO: 189 K/UL (ref 150–450)
PMV BLD AUTO: 10.3 FL (ref 9.4–12.3)
POTASSIUM SERPL-SCNC: 3.6 MMOL/L (ref 3.5–5.1)
PPD, POC: NEGATIVE
PROTHROMBIN TIME: 27.6 SEC (ref 12.6–14.3)
RBC # BLD AUTO: 4.35 M/UL (ref 4.05–5.2)
SERVICE CMNT-IMP: NORMAL
SODIUM SERPL-SCNC: 140 MMOL/L (ref 133–143)
WBC # BLD AUTO: 7 K/UL (ref 4.3–11.1)

## 2023-11-17 PROCEDURE — 2580000003 HC RX 258: Performed by: FAMILY MEDICINE

## 2023-11-17 PROCEDURE — 80048 BASIC METABOLIC PNL TOTAL CA: CPT

## 2023-11-17 PROCEDURE — 94640 AIRWAY INHALATION TREATMENT: CPT

## 2023-11-17 PROCEDURE — 6360000002 HC RX W HCPCS: Performed by: FAMILY MEDICINE

## 2023-11-17 PROCEDURE — 2140000000 HC CCU INTERMEDIATE R&B

## 2023-11-17 PROCEDURE — 6360000002 HC RX W HCPCS: Performed by: INTERNAL MEDICINE

## 2023-11-17 PROCEDURE — 85610 PROTHROMBIN TIME: CPT

## 2023-11-17 PROCEDURE — 85027 COMPLETE CBC AUTOMATED: CPT

## 2023-11-17 PROCEDURE — 36415 COLL VENOUS BLD VENIPUNCTURE: CPT

## 2023-11-17 PROCEDURE — 6370000000 HC RX 637 (ALT 250 FOR IP): Performed by: STUDENT IN AN ORGANIZED HEALTH CARE EDUCATION/TRAINING PROGRAM

## 2023-11-17 PROCEDURE — 2580000003 HC RX 258: Performed by: INTERNAL MEDICINE

## 2023-11-17 PROCEDURE — 6370000000 HC RX 637 (ALT 250 FOR IP): Performed by: FAMILY MEDICINE

## 2023-11-17 PROCEDURE — 99232 SBSQ HOSP IP/OBS MODERATE 35: CPT | Performed by: INTERNAL MEDICINE

## 2023-11-17 PROCEDURE — 2700000000 HC OXYGEN THERAPY PER DAY

## 2023-11-17 PROCEDURE — 94761 N-INVAS EAR/PLS OXIMETRY MLT: CPT

## 2023-11-17 PROCEDURE — 84100 ASSAY OF PHOSPHORUS: CPT

## 2023-11-17 RX ORDER — PREDNISONE 20 MG/1
40 TABLET ORAL DAILY
Status: DISCONTINUED | OUTPATIENT
Start: 2023-11-18 | End: 2023-11-21 | Stop reason: HOSPADM

## 2023-11-17 RX ORDER — HYDROCORTISONE ACETATE 25 MG/1
25 SUPPOSITORY RECTAL 2 TIMES DAILY
Status: DISCONTINUED | OUTPATIENT
Start: 2023-11-17 | End: 2023-11-21 | Stop reason: HOSPADM

## 2023-11-17 RX ORDER — LEVALBUTEROL INHALATION SOLUTION 0.63 MG/3ML
0.63 SOLUTION RESPIRATORY (INHALATION)
Status: DISCONTINUED | OUTPATIENT
Start: 2023-11-18 | End: 2023-11-21 | Stop reason: HOSPADM

## 2023-11-17 RX ADMIN — LEVALBUTEROL HYDROCHLORIDE 0.63 MG: 0.63 SOLUTION RESPIRATORY (INHALATION) at 11:21

## 2023-11-17 RX ADMIN — LEVALBUTEROL HYDROCHLORIDE 0.63 MG: 0.63 SOLUTION RESPIRATORY (INHALATION) at 07:47

## 2023-11-17 RX ADMIN — TRAZODONE HYDROCHLORIDE 50 MG: 50 TABLET ORAL at 21:58

## 2023-11-17 RX ADMIN — WARFARIN SODIUM 2.5 MG: 2.5 TABLET ORAL at 17:33

## 2023-11-17 RX ADMIN — LEVALBUTEROL HYDROCHLORIDE 0.63 MG: 0.63 SOLUTION RESPIRATORY (INHALATION) at 20:46

## 2023-11-17 RX ADMIN — GUAIFENESIN 600 MG: 600 TABLET ORAL at 21:58

## 2023-11-17 RX ADMIN — ACETAMINOPHEN 650 MG: 325 TABLET ORAL at 13:08

## 2023-11-17 RX ADMIN — FUROSEMIDE 20 MG: 20 TABLET ORAL at 08:12

## 2023-11-17 RX ADMIN — GABAPENTIN 400 MG: 400 CAPSULE ORAL at 21:30

## 2023-11-17 RX ADMIN — METHYLPREDNISOLONE SODIUM SUCCINATE 60 MG: 125 INJECTION, POWDER, FOR SOLUTION INTRAMUSCULAR; INTRAVENOUS at 00:02

## 2023-11-17 RX ADMIN — SODIUM CHLORIDE SOLN NEBU 3% 4 ML: 3 NEBU SOLN at 20:46

## 2023-11-17 RX ADMIN — SODIUM CHLORIDE, PRESERVATIVE FREE 10 ML: 5 INJECTION INTRAVENOUS at 22:04

## 2023-11-17 RX ADMIN — DILTIAZEM HYDROCHLORIDE 240 MG: 240 CAPSULE, COATED, EXTENDED RELEASE ORAL at 08:12

## 2023-11-17 RX ADMIN — METHYLPREDNISOLONE SODIUM SUCCINATE 40 MG: 40 INJECTION INTRAMUSCULAR; INTRAVENOUS at 21:59

## 2023-11-17 RX ADMIN — LEVALBUTEROL HYDROCHLORIDE 0.63 MG: 0.63 SOLUTION RESPIRATORY (INHALATION) at 03:56

## 2023-11-17 RX ADMIN — HYDROCORTISONE ACETATE 25 MG: 25 SUPPOSITORY RECTAL at 13:39

## 2023-11-17 RX ADMIN — GUAIFENESIN 600 MG: 600 TABLET ORAL at 08:12

## 2023-11-17 RX ADMIN — SODIUM CHLORIDE, PRESERVATIVE FREE 10 ML: 5 INJECTION INTRAVENOUS at 08:13

## 2023-11-17 RX ADMIN — HYDROCORTISONE ACETATE 25 MG: 25 SUPPOSITORY RECTAL at 21:30

## 2023-11-17 RX ADMIN — LEVALBUTEROL HYDROCHLORIDE 0.63 MG: 0.63 SOLUTION RESPIRATORY (INHALATION) at 00:59

## 2023-11-17 RX ADMIN — LEVALBUTEROL HYDROCHLORIDE 0.63 MG: 0.63 SOLUTION RESPIRATORY (INHALATION) at 15:40

## 2023-11-17 RX ADMIN — ACETAMINOPHEN 650 MG: 325 TABLET ORAL at 22:10

## 2023-11-17 RX ADMIN — BUDESONIDE INHALATION 500 MCG: 0.5 SUSPENSION RESPIRATORY (INHALATION) at 07:47

## 2023-11-17 RX ADMIN — Medication 3 MG: at 21:58

## 2023-11-17 RX ADMIN — METHYLPREDNISOLONE SODIUM SUCCINATE 60 MG: 125 INJECTION, POWDER, FOR SOLUTION INTRAMUSCULAR; INTRAVENOUS at 08:12

## 2023-11-17 RX ADMIN — SODIUM CHLORIDE SOLN NEBU 3% 4 ML: 3 NEBU SOLN at 07:48

## 2023-11-17 RX ADMIN — BUDESONIDE INHALATION 500 MCG: 0.5 SUSPENSION RESPIRATORY (INHALATION) at 20:46

## 2023-11-17 ASSESSMENT — PAIN SCALES - GENERAL
PAINLEVEL_OUTOF10: 7
PAINLEVEL_OUTOF10: 0
PAINLEVEL_OUTOF10: 0
PAINLEVEL_OUTOF10: 3

## 2023-11-17 ASSESSMENT — PAIN DESCRIPTION - LOCATION
LOCATION: BUTTOCKS
LOCATION: RECTUM

## 2023-11-17 ASSESSMENT — PAIN DESCRIPTION - DESCRIPTORS: DESCRIPTORS: BURNING

## 2023-11-17 ASSESSMENT — PAIN SCALES - WONG BAKER: WONGBAKER_NUMERICALRESPONSE: 0

## 2023-11-18 ENCOUNTER — APPOINTMENT (OUTPATIENT)
Dept: GENERAL RADIOLOGY | Age: 83
DRG: 189 | End: 2023-11-18
Attending: HOSPITALIST
Payer: MEDICARE

## 2023-11-18 LAB
ANION GAP SERPL CALC-SCNC: 8 MMOL/L (ref 2–11)
BUN SERPL-MCNC: 25 MG/DL (ref 8–23)
CALCIUM SERPL-MCNC: 8.4 MG/DL (ref 8.3–10.4)
CHLORIDE SERPL-SCNC: 105 MMOL/L (ref 101–110)
CO2 SERPL-SCNC: 27 MMOL/L (ref 21–32)
CREAT SERPL-MCNC: 0.8 MG/DL (ref 0.6–1)
ERYTHROCYTE [DISTWIDTH] IN BLOOD BY AUTOMATED COUNT: 15.4 % (ref 11.9–14.6)
GLUCOSE SERPL-MCNC: 194 MG/DL (ref 65–100)
HCT VFR BLD AUTO: 41.3 % (ref 35.8–46.3)
HGB BLD-MCNC: 13.5 G/DL (ref 11.7–15.4)
INR PPP: 3.1
MAGNESIUM SERPL-MCNC: 2.5 MG/DL (ref 1.8–2.4)
MCH RBC QN AUTO: 31.1 PG (ref 26.1–32.9)
MCHC RBC AUTO-ENTMCNC: 32.7 G/DL (ref 31.4–35)
MCV RBC AUTO: 95.2 FL (ref 82–102)
NRBC # BLD: 0 K/UL (ref 0–0.2)
PHOSPHATE SERPL-MCNC: 2.9 MG/DL (ref 2.3–3.7)
PLATELET # BLD AUTO: 183 K/UL (ref 150–450)
PMV BLD AUTO: 10.2 FL (ref 9.4–12.3)
POTASSIUM SERPL-SCNC: 3.4 MMOL/L (ref 3.5–5.1)
PROTHROMBIN TIME: 32.3 SEC (ref 12.6–14.3)
RBC # BLD AUTO: 4.34 M/UL (ref 4.05–5.2)
SODIUM SERPL-SCNC: 140 MMOL/L (ref 133–143)
WBC # BLD AUTO: 7.2 K/UL (ref 4.3–11.1)

## 2023-11-18 PROCEDURE — 80048 BASIC METABOLIC PNL TOTAL CA: CPT

## 2023-11-18 PROCEDURE — 2140000000 HC CCU INTERMEDIATE R&B

## 2023-11-18 PROCEDURE — 6360000002 HC RX W HCPCS: Performed by: INTERNAL MEDICINE

## 2023-11-18 PROCEDURE — 85027 COMPLETE CBC AUTOMATED: CPT

## 2023-11-18 PROCEDURE — 83735 ASSAY OF MAGNESIUM: CPT

## 2023-11-18 PROCEDURE — 94762 N-INVAS EAR/PLS OXIMTRY CONT: CPT

## 2023-11-18 PROCEDURE — 84100 ASSAY OF PHOSPHORUS: CPT

## 2023-11-18 PROCEDURE — 6370000000 HC RX 637 (ALT 250 FOR IP): Performed by: INTERNAL MEDICINE

## 2023-11-18 PROCEDURE — 36415 COLL VENOUS BLD VENIPUNCTURE: CPT

## 2023-11-18 PROCEDURE — 85610 PROTHROMBIN TIME: CPT

## 2023-11-18 PROCEDURE — 99232 SBSQ HOSP IP/OBS MODERATE 35: CPT | Performed by: INTERNAL MEDICINE

## 2023-11-18 PROCEDURE — 6360000002 HC RX W HCPCS: Performed by: STUDENT IN AN ORGANIZED HEALTH CARE EDUCATION/TRAINING PROGRAM

## 2023-11-18 PROCEDURE — 2580000003 HC RX 258: Performed by: FAMILY MEDICINE

## 2023-11-18 PROCEDURE — 6370000000 HC RX 637 (ALT 250 FOR IP): Performed by: STUDENT IN AN ORGANIZED HEALTH CARE EDUCATION/TRAINING PROGRAM

## 2023-11-18 PROCEDURE — 2700000000 HC OXYGEN THERAPY PER DAY

## 2023-11-18 PROCEDURE — 94761 N-INVAS EAR/PLS OXIMETRY MLT: CPT

## 2023-11-18 PROCEDURE — 94640 AIRWAY INHALATION TREATMENT: CPT

## 2023-11-18 PROCEDURE — 6370000000 HC RX 637 (ALT 250 FOR IP): Performed by: FAMILY MEDICINE

## 2023-11-18 PROCEDURE — 71045 X-RAY EXAM CHEST 1 VIEW: CPT

## 2023-11-18 RX ORDER — POTASSIUM CHLORIDE 20 MEQ/1
40 TABLET, EXTENDED RELEASE ORAL ONCE
Status: DISCONTINUED | OUTPATIENT
Start: 2023-11-18 | End: 2023-11-18

## 2023-11-18 RX ORDER — POTASSIUM CHLORIDE 20 MEQ/1
40 TABLET, EXTENDED RELEASE ORAL ONCE
Status: COMPLETED | OUTPATIENT
Start: 2023-11-18 | End: 2023-11-18

## 2023-11-18 RX ADMIN — POTASSIUM CHLORIDE 40 MEQ: 1500 TABLET, EXTENDED RELEASE ORAL at 08:42

## 2023-11-18 RX ADMIN — BUDESONIDE INHALATION 500 MCG: 0.5 SUSPENSION RESPIRATORY (INHALATION) at 07:55

## 2023-11-18 RX ADMIN — LEVALBUTEROL HYDROCHLORIDE 0.63 MG: 0.63 SOLUTION RESPIRATORY (INHALATION) at 19:13

## 2023-11-18 RX ADMIN — SODIUM CHLORIDE SOLN NEBU 3% 4 ML: 3 NEBU SOLN at 07:55

## 2023-11-18 RX ADMIN — HYDROCORTISONE ACETATE 25 MG: 25 SUPPOSITORY RECTAL at 08:45

## 2023-11-18 RX ADMIN — HYDROCORTISONE ACETATE 25 MG: 25 SUPPOSITORY RECTAL at 22:15

## 2023-11-18 RX ADMIN — SODIUM CHLORIDE, PRESERVATIVE FREE 10 ML: 5 INJECTION INTRAVENOUS at 08:44

## 2023-11-18 RX ADMIN — PREDNISONE 40 MG: 20 TABLET ORAL at 08:43

## 2023-11-18 RX ADMIN — LEVALBUTEROL HYDROCHLORIDE 0.63 MG: 0.63 SOLUTION RESPIRATORY (INHALATION) at 07:55

## 2023-11-18 RX ADMIN — SODIUM CHLORIDE, PRESERVATIVE FREE 10 ML: 5 INJECTION INTRAVENOUS at 21:00

## 2023-11-18 RX ADMIN — GUAIFENESIN 600 MG: 600 TABLET ORAL at 08:42

## 2023-11-18 RX ADMIN — GUAIFENESIN 600 MG: 600 TABLET ORAL at 22:15

## 2023-11-18 RX ADMIN — POTASSIUM CHLORIDE 40 MEQ: 1500 TABLET, EXTENDED RELEASE ORAL at 12:11

## 2023-11-18 RX ADMIN — ACETAMINOPHEN 650 MG: 325 TABLET ORAL at 04:41

## 2023-11-18 RX ADMIN — FUROSEMIDE 20 MG: 20 TABLET ORAL at 08:42

## 2023-11-18 RX ADMIN — DILTIAZEM HYDROCHLORIDE 240 MG: 240 CAPSULE, COATED, EXTENDED RELEASE ORAL at 08:42

## 2023-11-18 RX ADMIN — SODIUM CHLORIDE SOLN NEBU 3% 4 ML: 3 NEBU SOLN at 19:13

## 2023-11-18 RX ADMIN — LEVALBUTEROL HYDROCHLORIDE 0.63 MG: 0.63 SOLUTION RESPIRATORY (INHALATION) at 14:27

## 2023-11-18 RX ADMIN — Medication 3 MG: at 22:15

## 2023-11-18 RX ADMIN — BUDESONIDE INHALATION 500 MCG: 0.5 SUSPENSION RESPIRATORY (INHALATION) at 19:13

## 2023-11-18 RX ADMIN — GABAPENTIN 400 MG: 400 CAPSULE ORAL at 21:30

## 2023-11-18 RX ADMIN — ACETAMINOPHEN 650 MG: 325 TABLET ORAL at 22:16

## 2023-11-18 RX ADMIN — TRAZODONE HYDROCHLORIDE 50 MG: 50 TABLET ORAL at 22:15

## 2023-11-18 ASSESSMENT — PAIN DESCRIPTION - LOCATION
LOCATION: RECTUM
LOCATION: RECTUM

## 2023-11-18 ASSESSMENT — PAIN SCALES - WONG BAKER
WONGBAKER_NUMERICALRESPONSE: 0
WONGBAKER_NUMERICALRESPONSE: 2

## 2023-11-18 ASSESSMENT — PAIN SCALES - GENERAL
PAINLEVEL_OUTOF10: 4
PAINLEVEL_OUTOF10: 4

## 2023-11-18 ASSESSMENT — PAIN DESCRIPTION - DESCRIPTORS
DESCRIPTORS: ACHING
DESCRIPTORS: ACHING

## 2023-11-19 LAB
ALBUMIN SERPL-MCNC: 3 G/DL (ref 3.2–4.6)
ALBUMIN/GLOB SERPL: 1.3 (ref 0.4–1.6)
ALP SERPL-CCNC: 55 U/L (ref 50–136)
ALT SERPL-CCNC: 28 U/L (ref 12–65)
ANION GAP SERPL CALC-SCNC: 6 MMOL/L (ref 2–11)
AST SERPL-CCNC: 9 U/L (ref 15–37)
BASOPHILS # BLD: 0 K/UL (ref 0–0.2)
BASOPHILS NFR BLD: 0 % (ref 0–2)
BILIRUB SERPL-MCNC: 0.4 MG/DL (ref 0.2–1.1)
BUN SERPL-MCNC: 28 MG/DL (ref 8–23)
CALCIUM SERPL-MCNC: 8.3 MG/DL (ref 8.3–10.4)
CHLORIDE SERPL-SCNC: 107 MMOL/L (ref 101–110)
CO2 SERPL-SCNC: 27 MMOL/L (ref 21–32)
CREAT SERPL-MCNC: 0.8 MG/DL (ref 0.6–1)
DIFFERENTIAL METHOD BLD: ABNORMAL
EOSINOPHIL # BLD: 0 K/UL (ref 0–0.8)
EOSINOPHIL NFR BLD: 0 % (ref 0.5–7.8)
ERYTHROCYTE [DISTWIDTH] IN BLOOD BY AUTOMATED COUNT: 15.4 % (ref 11.9–14.6)
GLOBULIN SER CALC-MCNC: 2.4 G/DL (ref 2.8–4.5)
GLUCOSE SERPL-MCNC: 155 MG/DL (ref 65–100)
HCT VFR BLD AUTO: 41.2 % (ref 35.8–46.3)
HGB BLD-MCNC: 13.3 G/DL (ref 11.7–15.4)
IMM GRANULOCYTES # BLD AUTO: 0.1 K/UL (ref 0–0.5)
IMM GRANULOCYTES NFR BLD AUTO: 1 % (ref 0–5)
INR PPP: 3.3
LYMPHOCYTES # BLD: 0.7 K/UL (ref 0.5–4.6)
LYMPHOCYTES NFR BLD: 9 % (ref 13–44)
MCH RBC QN AUTO: 30.9 PG (ref 26.1–32.9)
MCHC RBC AUTO-ENTMCNC: 32.3 G/DL (ref 31.4–35)
MCV RBC AUTO: 95.6 FL (ref 82–102)
MONOCYTES # BLD: 0.8 K/UL (ref 0.1–1.3)
MONOCYTES NFR BLD: 10 % (ref 4–12)
NEUTS SEG # BLD: 6.8 K/UL (ref 1.7–8.2)
NEUTS SEG NFR BLD: 80 % (ref 43–78)
NRBC # BLD: 0 K/UL (ref 0–0.2)
PLATELET # BLD AUTO: 191 K/UL (ref 150–450)
PMV BLD AUTO: 10.4 FL (ref 9.4–12.3)
POTASSIUM SERPL-SCNC: 3.9 MMOL/L (ref 3.5–5.1)
PROT SERPL-MCNC: 5.4 G/DL (ref 6.3–8.2)
PROTHROMBIN TIME: 34 SEC (ref 12.6–14.3)
RBC # BLD AUTO: 4.31 M/UL (ref 4.05–5.2)
SODIUM SERPL-SCNC: 140 MMOL/L (ref 133–143)
WBC # BLD AUTO: 8.5 K/UL (ref 4.3–11.1)

## 2023-11-19 PROCEDURE — 36415 COLL VENOUS BLD VENIPUNCTURE: CPT

## 2023-11-19 PROCEDURE — 2140000000 HC CCU INTERMEDIATE R&B

## 2023-11-19 PROCEDURE — 99232 SBSQ HOSP IP/OBS MODERATE 35: CPT | Performed by: INTERNAL MEDICINE

## 2023-11-19 PROCEDURE — 80053 COMPREHEN METABOLIC PANEL: CPT

## 2023-11-19 PROCEDURE — 85610 PROTHROMBIN TIME: CPT

## 2023-11-19 PROCEDURE — 94640 AIRWAY INHALATION TREATMENT: CPT

## 2023-11-19 PROCEDURE — 85025 COMPLETE CBC W/AUTO DIFF WBC: CPT

## 2023-11-19 PROCEDURE — 6360000002 HC RX W HCPCS: Performed by: INTERNAL MEDICINE

## 2023-11-19 PROCEDURE — 6370000000 HC RX 637 (ALT 250 FOR IP): Performed by: INTERNAL MEDICINE

## 2023-11-19 PROCEDURE — 2700000000 HC OXYGEN THERAPY PER DAY

## 2023-11-19 PROCEDURE — 6370000000 HC RX 637 (ALT 250 FOR IP): Performed by: STUDENT IN AN ORGANIZED HEALTH CARE EDUCATION/TRAINING PROGRAM

## 2023-11-19 PROCEDURE — 2580000003 HC RX 258: Performed by: FAMILY MEDICINE

## 2023-11-19 PROCEDURE — 94761 N-INVAS EAR/PLS OXIMETRY MLT: CPT

## 2023-11-19 PROCEDURE — 94760 N-INVAS EAR/PLS OXIMETRY 1: CPT

## 2023-11-19 PROCEDURE — 6360000002 HC RX W HCPCS: Performed by: STUDENT IN AN ORGANIZED HEALTH CARE EDUCATION/TRAINING PROGRAM

## 2023-11-19 PROCEDURE — 6370000000 HC RX 637 (ALT 250 FOR IP): Performed by: FAMILY MEDICINE

## 2023-11-19 RX ORDER — DEXTROSE AND SODIUM CHLORIDE 5; .45 G/100ML; G/100ML
INJECTION, SOLUTION INTRAVENOUS CONTINUOUS
Status: ACTIVE | OUTPATIENT
Start: 2023-11-20 | End: 2023-11-21

## 2023-11-19 RX ADMIN — FUROSEMIDE 20 MG: 20 TABLET ORAL at 08:08

## 2023-11-19 RX ADMIN — SODIUM CHLORIDE, PRESERVATIVE FREE 10 ML: 5 INJECTION INTRAVENOUS at 22:16

## 2023-11-19 RX ADMIN — TRAZODONE HYDROCHLORIDE 50 MG: 50 TABLET ORAL at 22:15

## 2023-11-19 RX ADMIN — GUAIFENESIN 600 MG: 600 TABLET ORAL at 08:08

## 2023-11-19 RX ADMIN — LEVALBUTEROL HYDROCHLORIDE 0.63 MG: 0.63 SOLUTION RESPIRATORY (INHALATION) at 13:51

## 2023-11-19 RX ADMIN — LEVALBUTEROL HYDROCHLORIDE 0.63 MG: 0.63 SOLUTION RESPIRATORY (INHALATION) at 20:28

## 2023-11-19 RX ADMIN — SODIUM CHLORIDE SOLN NEBU 3% 4 ML: 3 NEBU SOLN at 20:28

## 2023-11-19 RX ADMIN — BUDESONIDE INHALATION 500 MCG: 0.5 SUSPENSION RESPIRATORY (INHALATION) at 07:54

## 2023-11-19 RX ADMIN — GUAIFENESIN 600 MG: 600 TABLET ORAL at 22:15

## 2023-11-19 RX ADMIN — Medication 3 MG: at 22:15

## 2023-11-19 RX ADMIN — PREDNISONE 40 MG: 20 TABLET ORAL at 08:08

## 2023-11-19 RX ADMIN — DILTIAZEM HYDROCHLORIDE 240 MG: 240 CAPSULE, COATED, EXTENDED RELEASE ORAL at 08:08

## 2023-11-19 RX ADMIN — ACETAMINOPHEN 650 MG: 325 TABLET ORAL at 22:15

## 2023-11-19 RX ADMIN — LEVALBUTEROL HYDROCHLORIDE 0.63 MG: 0.63 SOLUTION RESPIRATORY (INHALATION) at 07:54

## 2023-11-19 RX ADMIN — HYDROCORTISONE ACETATE 25 MG: 25 SUPPOSITORY RECTAL at 22:15

## 2023-11-19 RX ADMIN — GABAPENTIN 400 MG: 400 CAPSULE ORAL at 21:30

## 2023-11-19 RX ADMIN — SODIUM CHLORIDE, PRESERVATIVE FREE 10 ML: 5 INJECTION INTRAVENOUS at 08:09

## 2023-11-19 RX ADMIN — BUDESONIDE INHALATION 500 MCG: 0.5 SUSPENSION RESPIRATORY (INHALATION) at 20:28

## 2023-11-19 ASSESSMENT — PAIN DESCRIPTION - LOCATION: LOCATION: RECTUM

## 2023-11-19 ASSESSMENT — PAIN DESCRIPTION - DESCRIPTORS: DESCRIPTORS: ACHING

## 2023-11-19 ASSESSMENT — PAIN SCALES - GENERAL
PAINLEVEL_OUTOF10: 3
PAINLEVEL_OUTOF10: 0
PAINLEVEL_OUTOF10: 0

## 2023-11-19 ASSESSMENT — PAIN SCALES - WONG BAKER: WONGBAKER_NUMERICALRESPONSE: 0

## 2023-11-20 ENCOUNTER — APPOINTMENT (OUTPATIENT)
Dept: ULTRASOUND IMAGING | Age: 83
DRG: 189 | End: 2023-11-20
Attending: HOSPITALIST
Payer: MEDICARE

## 2023-11-20 ENCOUNTER — ANESTHESIA EVENT (OUTPATIENT)
Dept: CARDIAC CATH/INVASIVE PROCEDURES | Age: 83
DRG: 189 | End: 2023-11-20
Payer: MEDICARE

## 2023-11-20 ENCOUNTER — APPOINTMENT (OUTPATIENT)
Dept: CT IMAGING | Age: 83
DRG: 189 | End: 2023-11-20
Attending: HOSPITALIST
Payer: MEDICARE

## 2023-11-20 ENCOUNTER — ANESTHESIA (OUTPATIENT)
Dept: CARDIAC CATH/INVASIVE PROCEDURES | Age: 83
DRG: 189 | End: 2023-11-20
Payer: MEDICARE

## 2023-11-20 ENCOUNTER — APPOINTMENT (OUTPATIENT)
Dept: CARDIAC CATH/INVASIVE PROCEDURES | Age: 83
DRG: 189 | End: 2023-11-20
Attending: INTERNAL MEDICINE
Payer: MEDICARE

## 2023-11-20 LAB
CREAT BLD-MCNC: 0.57 MG/DL (ref 0.8–1.5)
INR PPP: 2.4
PROTHROMBIN TIME: 27.1 SEC (ref 12.6–14.3)

## 2023-11-20 PROCEDURE — 3700000001 HC ADD 15 MINUTES (ANESTHESIA)

## 2023-11-20 PROCEDURE — 6360000002 HC RX W HCPCS: Performed by: INTERNAL MEDICINE

## 2023-11-20 PROCEDURE — 2140000000 HC CCU INTERMEDIATE R&B

## 2023-11-20 PROCEDURE — 94640 AIRWAY INHALATION TREATMENT: CPT

## 2023-11-20 PROCEDURE — 6360000002 HC RX W HCPCS: Performed by: REGISTERED NURSE

## 2023-11-20 PROCEDURE — 82565 ASSAY OF CREATININE: CPT

## 2023-11-20 PROCEDURE — 6360000002 HC RX W HCPCS: Performed by: STUDENT IN AN ORGANIZED HEALTH CARE EDUCATION/TRAINING PROGRAM

## 2023-11-20 PROCEDURE — 6370000000 HC RX 637 (ALT 250 FOR IP): Performed by: INTERNAL MEDICINE

## 2023-11-20 PROCEDURE — 2580000003 HC RX 258: Performed by: STUDENT IN AN ORGANIZED HEALTH CARE EDUCATION/TRAINING PROGRAM

## 2023-11-20 PROCEDURE — 6370000000 HC RX 637 (ALT 250 FOR IP): Performed by: STUDENT IN AN ORGANIZED HEALTH CARE EDUCATION/TRAINING PROGRAM

## 2023-11-20 PROCEDURE — B24BZZ4 ULTRASONOGRAPHY OF HEART WITH AORTA, TRANSESOPHAGEAL: ICD-10-PCS | Performed by: INTERNAL MEDICINE

## 2023-11-20 PROCEDURE — 99232 SBSQ HOSP IP/OBS MODERATE 35: CPT | Performed by: INTERNAL MEDICINE

## 2023-11-20 PROCEDURE — 3700000000 HC ANESTHESIA ATTENDED CARE

## 2023-11-20 PROCEDURE — 2500000003 HC RX 250 WO HCPCS: Performed by: REGISTERED NURSE

## 2023-11-20 PROCEDURE — 71275 CT ANGIOGRAPHY CHEST: CPT

## 2023-11-20 PROCEDURE — 75574 CT ANGIO HRT W/3D IMAGE: CPT

## 2023-11-20 PROCEDURE — 36415 COLL VENOUS BLD VENIPUNCTURE: CPT

## 2023-11-20 PROCEDURE — 6370000000 HC RX 637 (ALT 250 FOR IP): Performed by: NURSE PRACTITIONER

## 2023-11-20 PROCEDURE — 93312 ECHO TRANSESOPHAGEAL: CPT

## 2023-11-20 PROCEDURE — 6360000004 HC RX CONTRAST MEDICATION: Performed by: INTERNAL MEDICINE

## 2023-11-20 PROCEDURE — 2580000003 HC RX 258: Performed by: REGISTERED NURSE

## 2023-11-20 PROCEDURE — 2580000003 HC RX 258: Performed by: FAMILY MEDICINE

## 2023-11-20 PROCEDURE — 85610 PROTHROMBIN TIME: CPT

## 2023-11-20 PROCEDURE — 93970 EXTREMITY STUDY: CPT

## 2023-11-20 PROCEDURE — 6370000000 HC RX 637 (ALT 250 FOR IP): Performed by: FAMILY MEDICINE

## 2023-11-20 RX ORDER — PROPOFOL 10 MG/ML
INJECTION, EMULSION INTRAVENOUS CONTINUOUS PRN
Status: DISCONTINUED | OUTPATIENT
Start: 2023-11-20 | End: 2023-11-20 | Stop reason: SDUPTHER

## 2023-11-20 RX ORDER — WARFARIN SODIUM 2 MG/1
1 TABLET ORAL DAILY
Status: DISCONTINUED | OUTPATIENT
Start: 2023-11-20 | End: 2023-11-21

## 2023-11-20 RX ORDER — SODIUM CHLORIDE, SODIUM LACTATE, POTASSIUM CHLORIDE, CALCIUM CHLORIDE 600; 310; 30; 20 MG/100ML; MG/100ML; MG/100ML; MG/100ML
INJECTION, SOLUTION INTRAVENOUS CONTINUOUS PRN
Status: DISCONTINUED | OUTPATIENT
Start: 2023-11-20 | End: 2023-11-20 | Stop reason: SDUPTHER

## 2023-11-20 RX ORDER — LIDOCAINE HYDROCHLORIDE 20 MG/ML
INJECTION, SOLUTION EPIDURAL; INFILTRATION; INTRACAUDAL; PERINEURAL PRN
Status: DISCONTINUED | OUTPATIENT
Start: 2023-11-20 | End: 2023-11-20 | Stop reason: SDUPTHER

## 2023-11-20 RX ORDER — MONTELUKAST SODIUM 10 MG/1
10 TABLET ORAL NIGHTLY
Status: DISCONTINUED | OUTPATIENT
Start: 2023-11-20 | End: 2023-11-21 | Stop reason: HOSPADM

## 2023-11-20 RX ADMIN — LEVOTHYROXINE SODIUM 137 MCG: 0.11 TABLET ORAL at 09:58

## 2023-11-20 RX ADMIN — SODIUM CHLORIDE SOLN NEBU 3% 4 ML: 3 NEBU SOLN at 07:16

## 2023-11-20 RX ADMIN — LEVALBUTEROL HYDROCHLORIDE 0.63 MG: 0.63 SOLUTION RESPIRATORY (INHALATION) at 07:16

## 2023-11-20 RX ADMIN — BUDESONIDE INHALATION 500 MCG: 0.5 SUSPENSION RESPIRATORY (INHALATION) at 07:16

## 2023-11-20 RX ADMIN — GUAIFENESIN 600 MG: 600 TABLET ORAL at 08:06

## 2023-11-20 RX ADMIN — LIDOCAINE HYDROCHLORIDE 50 MG: 20 INJECTION, SOLUTION EPIDURAL; INFILTRATION; INTRACAUDAL; PERINEURAL at 13:25

## 2023-11-20 RX ADMIN — Medication 3 MG: at 22:18

## 2023-11-20 RX ADMIN — LEVALBUTEROL HYDROCHLORIDE 0.63 MG: 0.63 SOLUTION RESPIRATORY (INHALATION) at 22:13

## 2023-11-20 RX ADMIN — GABAPENTIN 400 MG: 400 CAPSULE ORAL at 22:19

## 2023-11-20 RX ADMIN — TRAZODONE HYDROCHLORIDE 50 MG: 50 TABLET ORAL at 22:19

## 2023-11-20 RX ADMIN — SODIUM CHLORIDE, PRESERVATIVE FREE 10 ML: 5 INJECTION INTRAVENOUS at 08:07

## 2023-11-20 RX ADMIN — MONTELUKAST 10 MG: 10 TABLET, FILM COATED ORAL at 20:11

## 2023-11-20 RX ADMIN — WARFARIN SODIUM 1 MG: 2 TABLET ORAL at 17:07

## 2023-11-20 RX ADMIN — DILTIAZEM HYDROCHLORIDE 240 MG: 240 CAPSULE, COATED, EXTENDED RELEASE ORAL at 08:06

## 2023-11-20 RX ADMIN — SODIUM CHLORIDE SOLN NEBU 3% 4 ML: 3 NEBU SOLN at 22:13

## 2023-11-20 RX ADMIN — FUROSEMIDE 20 MG: 20 TABLET ORAL at 08:06

## 2023-11-20 RX ADMIN — PROPOFOL 100 MCG/KG/MIN: 10 INJECTION, EMULSION INTRAVENOUS at 13:25

## 2023-11-20 RX ADMIN — IOPAMIDOL 75 ML: 755 INJECTION, SOLUTION INTRAVENOUS at 16:54

## 2023-11-20 RX ADMIN — SODIUM CHLORIDE, PRESERVATIVE FREE 10 ML: 5 INJECTION INTRAVENOUS at 22:25

## 2023-11-20 RX ADMIN — GUAIFENESIN 600 MG: 600 TABLET ORAL at 20:12

## 2023-11-20 RX ADMIN — PREDNISONE 40 MG: 20 TABLET ORAL at 08:06

## 2023-11-20 RX ADMIN — SODIUM CHLORIDE, SODIUM LACTATE, POTASSIUM CHLORIDE, AND CALCIUM CHLORIDE: 600; 310; 30; 20 INJECTION, SOLUTION INTRAVENOUS at 13:23

## 2023-11-20 RX ADMIN — BUDESONIDE INHALATION 500 MCG: 0.5 SUSPENSION RESPIRATORY (INHALATION) at 22:13

## 2023-11-20 RX ADMIN — DEXTROSE AND SODIUM CHLORIDE: 5; 450 INJECTION, SOLUTION INTRAVENOUS at 01:37

## 2023-11-20 ASSESSMENT — ENCOUNTER SYMPTOMS: EYE DISCHARGE: 0

## 2023-11-20 NOTE — CARE COORDINATION
Pt to have planned SARA today. She is on 2L supplemental and will require a walk test prior to discharge. Son at bedside and requesting a [facility] for his mother until she can regain her strength back, pt declined. CM recommended a RW instead, she declined that as well. Son requested [facility] for his mother, she declined and stated that she still wants OP PT. Will send referral form to [facility] Scheduling at [phone] once MD signs the order. 1330-MD signed referral form and was faxed. Pt now on RA and does not qualify for home oxygen per RT:  6 Minute Walk Test   Pre-Test Vitals:  ;   97% 2L NC                                   95 % RA     Post-Test Vitals:      91 % RA     Distance:  ;  250 ft             No other discharge needs identified. Tx goals met. 11/20/23 1125   Discharge Planning   DME Ordered? Oxygen therapy (comment)   Potential Assistance Purchasing Medications No   Services At/After Discharge   Transition of Care Consult (CM Consult) Discharge [facility] Discharge Outpatient;PT   [employer] Information Provided? No   Mode of Transport at Discharge Other (see comment)  (Family)   Confirm Follow Up Transport Family   Condition of Participation: Discharge Planning   The Patient and/or Patient Representative was provided with a Choice of Provider? Patient   The Patient and/Or Patient Representative agree with the Discharge Plan? Yes   Freedom of Choice list was provided with basic dialogue that supports the patient's individualized plan of care/goals, treatment preferences, and shares the quality data associated with the providers?   Yes
Admission None   Potential Assistance Needed N/A   DME Ordered? No   Potential Assistance Purchasing Medications No   Type of Home Care Services None   Services At/After Discharge   Transition of Care Consult (CM Consult) Discharge [address] Provided?  No   Mode of Transport at Discharge Other (see comment)  (Family)   Confirm Follow Up Transport Family

## 2023-11-21 ENCOUNTER — TELEPHONE (OUTPATIENT)
Dept: PULMONOLOGY | Age: 83
End: 2023-11-21

## 2023-11-21 VITALS
BODY MASS INDEX: 38.6 KG/M2 | DIASTOLIC BLOOD PRESSURE: 60 MMHG | OXYGEN SATURATION: 93 % | HEIGHT: 60 IN | RESPIRATION RATE: 15 BRPM | WEIGHT: 196.6 LBS | TEMPERATURE: 98 F | SYSTOLIC BLOOD PRESSURE: 108 MMHG | HEART RATE: 72 BPM

## 2023-11-21 LAB
ANION GAP SERPL CALC-SCNC: 4 MMOL/L (ref 2–11)
BUN SERPL-MCNC: 25 MG/DL (ref 8–23)
CALCIUM SERPL-MCNC: 8.8 MG/DL (ref 8.3–10.4)
CHLORIDE SERPL-SCNC: 105 MMOL/L (ref 101–110)
CO2 SERPL-SCNC: 33 MMOL/L (ref 21–32)
CREAT SERPL-MCNC: 0.8 MG/DL (ref 0.6–1)
ECHO BSA: 1.92 M2
GLUCOSE SERPL-MCNC: 129 MG/DL (ref 65–100)
INR PPP: 1.8
POTASSIUM SERPL-SCNC: 3.5 MMOL/L (ref 3.5–5.1)
PROTHROMBIN TIME: 21.4 SEC (ref 12.6–14.3)
SODIUM SERPL-SCNC: 142 MMOL/L (ref 133–143)

## 2023-11-21 PROCEDURE — 6370000000 HC RX 637 (ALT 250 FOR IP): Performed by: FAMILY MEDICINE

## 2023-11-21 PROCEDURE — 2580000003 HC RX 258: Performed by: FAMILY MEDICINE

## 2023-11-21 PROCEDURE — 93325 DOPPLER ECHO COLOR FLOW MAPG: CPT | Performed by: INTERNAL MEDICINE

## 2023-11-21 PROCEDURE — 93320 DOPPLER ECHO COMPLETE: CPT | Performed by: INTERNAL MEDICINE

## 2023-11-21 PROCEDURE — 6360000002 HC RX W HCPCS: Performed by: INTERNAL MEDICINE

## 2023-11-21 PROCEDURE — 6370000000 HC RX 637 (ALT 250 FOR IP): Performed by: STUDENT IN AN ORGANIZED HEALTH CARE EDUCATION/TRAINING PROGRAM

## 2023-11-21 PROCEDURE — 85610 PROTHROMBIN TIME: CPT

## 2023-11-21 PROCEDURE — 6360000002 HC RX W HCPCS: Performed by: STUDENT IN AN ORGANIZED HEALTH CARE EDUCATION/TRAINING PROGRAM

## 2023-11-21 PROCEDURE — 93312 ECHO TRANSESOPHAGEAL: CPT | Performed by: INTERNAL MEDICINE

## 2023-11-21 PROCEDURE — 6370000000 HC RX 637 (ALT 250 FOR IP): Performed by: INTERNAL MEDICINE

## 2023-11-21 PROCEDURE — 80048 BASIC METABOLIC PNL TOTAL CA: CPT

## 2023-11-21 PROCEDURE — 36415 COLL VENOUS BLD VENIPUNCTURE: CPT

## 2023-11-21 PROCEDURE — 94640 AIRWAY INHALATION TREATMENT: CPT

## 2023-11-21 RX ORDER — DILTIAZEM HYDROCHLORIDE 240 MG/1
240 CAPSULE, COATED, EXTENDED RELEASE ORAL DAILY
Qty: 30 CAPSULE | Refills: 0 | Status: SHIPPED | OUTPATIENT
Start: 2023-11-22

## 2023-11-21 RX ORDER — LEVALBUTEROL INHALATION SOLUTION 0.63 MG/3ML
0.63 SOLUTION RESPIRATORY (INHALATION)
Qty: 1 EACH | Refills: 0 | Status: SHIPPED | OUTPATIENT
Start: 2023-11-21

## 2023-11-21 RX ORDER — WARFARIN SODIUM 2.5 MG/1
TABLET ORAL
Qty: 30 TABLET | Refills: 0 | Status: SHIPPED | OUTPATIENT
Start: 2023-11-21 | End: 2023-11-21 | Stop reason: SDUPTHER

## 2023-11-21 RX ORDER — FUROSEMIDE 20 MG/1
20 TABLET ORAL DAILY
Qty: 30 TABLET | Refills: 0 | Status: SHIPPED | OUTPATIENT
Start: 2023-11-22 | End: 2023-12-22

## 2023-11-21 RX ORDER — WARFARIN SODIUM 2.5 MG/1
2.5 TABLET ORAL DAILY
Status: DISCONTINUED | OUTPATIENT
Start: 2023-11-21 | End: 2023-11-21 | Stop reason: HOSPADM

## 2023-11-21 RX ORDER — HYDROCORTISONE ACETATE 25 MG/1
25 SUPPOSITORY RECTAL 2 TIMES DAILY
Qty: 1 SUPPOSITORY | Refills: 0 | Status: SHIPPED | OUTPATIENT
Start: 2023-11-21 | End: 2023-11-22

## 2023-11-21 RX ORDER — WARFARIN SODIUM 2.5 MG/1
2.5 TABLET ORAL DAILY
Qty: 30 TABLET | Refills: 0 | Status: SHIPPED | OUTPATIENT
Start: 2023-11-21 | End: 2023-12-21

## 2023-11-21 RX ORDER — BUDESONIDE 0.5 MG/2ML
0.5 INHALANT ORAL
Qty: 1 EACH | Refills: 0 | Status: SHIPPED | OUTPATIENT
Start: 2023-11-21

## 2023-11-21 RX ORDER — PREDNISONE 20 MG/1
40 TABLET ORAL DAILY
Qty: 10 TABLET | Refills: 0 | Status: SHIPPED | OUTPATIENT
Start: 2023-11-22 | End: 2023-11-27

## 2023-11-21 RX ADMIN — LEVALBUTEROL HYDROCHLORIDE 0.63 MG: 0.63 SOLUTION RESPIRATORY (INHALATION) at 08:13

## 2023-11-21 RX ADMIN — SODIUM CHLORIDE, PRESERVATIVE FREE 10 ML: 5 INJECTION INTRAVENOUS at 07:49

## 2023-11-21 RX ADMIN — LEVOTHYROXINE SODIUM 137 MCG: 0.11 TABLET ORAL at 06:24

## 2023-11-21 RX ADMIN — PREDNISONE 40 MG: 20 TABLET ORAL at 07:49

## 2023-11-21 RX ADMIN — BUDESONIDE INHALATION 500 MCG: 0.5 SUSPENSION RESPIRATORY (INHALATION) at 08:13

## 2023-11-21 RX ADMIN — DILTIAZEM HYDROCHLORIDE 240 MG: 240 CAPSULE, COATED, EXTENDED RELEASE ORAL at 07:48

## 2023-11-21 RX ADMIN — FUROSEMIDE 20 MG: 20 TABLET ORAL at 07:48

## 2023-11-21 RX ADMIN — SODIUM CHLORIDE SOLN NEBU 3% 4 ML: 3 NEBU SOLN at 08:13

## 2023-11-21 RX ADMIN — GUAIFENESIN 600 MG: 600 TABLET ORAL at 07:49

## 2023-11-21 ASSESSMENT — PAIN SCALES - GENERAL
PAINLEVEL_OUTOF10: 0
PAINLEVEL_OUTOF10: 0

## 2023-11-21 NOTE — TELEPHONE ENCOUNTER
Still currently admitted. Triage to follow. ----- Message from Marie Wharton MD sent at 11/20/2023  3:56 PM EST -----  Regarding: tcm  Please contact patient and arrange a tCM visit for asthma with [place], spirometry.     Thank you,   Marie Wharton MD

## 2023-11-21 NOTE — DISCHARGE SUMMARY
Donnie       Labs: Results:       BMP, Mg, Phos Recent Labs     11/19/23  0314 11/20/23  1638 11/21/23 0315     --  142   K 3.9  --  3.5     --  105   CO2 27  --  33*   ANIONGAP 6  --  4   BUN 28*  --  25*   CREATININE 0.80 0.57* 0.80   LABGLOM >60  --  >60   CALCIUM 8.3  --  8.8   GLUCOSE 155*  --  129*      CBC Recent Labs     11/19/23 0314   WBC 8.5   RBC 4.31   HGB 13.3   HCT 41.2   MCV 95.6   MCH 30.9   MCHC 32.3   RDW 15.4*      MPV 10.4   NRBC 0.00   LYMPHOPCT 9*   EOSRELPCT 0*   MONOPCT 10   BASOPCT 0   IMMGRAN 1   LYMPHSABS 0.7   EOSABS 0.0   MONOSABS 0.8   BASOSABS 0.0   ABSIMMGRAN 0.1      LFT Recent Labs     11/19/23 0314   BILITOT 0.4   ALKPHOS 55   AST 9*   ALT 28   PROT 5.4*   LABALBU 3.0*   GLOB 2.4*      Cardiac  Lab Results   Component Value Date/Time    NTPROBNP 1,183 11/14/2023 10:41 AM    NTPROBNP 944 11/11/2023 04:12 AM    TROPHS 11.1 11/11/2023 04:12 AM      Coags Lab Results   Component Value Date/Time    PROTIME 21.4 11/21/2023 03:15 AM    PROTIME 27.1 11/20/2023 05:50 AM    PROTIME 34.0 11/19/2023 03:14 AM    INR 1.8 11/21/2023 03:15 AM    INR 2.4 11/20/2023 05:50 AM    INR 3.3 11/19/2023 03:14 AM      A1c Lab Results   Component Value Date/Time    LABA1C 5.3 11/12/2023 03:23 AM     11/12/2023 03:23 AM      Lipids Lab Results   Component Value Date/Time    CHOL 218 11/12/2023 03:23 AM    LDLCALC 128.8 11/12/2023 03:23 AM    LABVLDL 12.2 11/12/2023 03:23 AM    HDL 77 11/12/2023 03:23 AM    CHOLHDLRATIO 2.8 11/12/2023 03:23 AM    TRIG 61 11/12/2023 03:23 AM      Thyroid  Lab Results   Component Value Date/Time    TSHELE 0.48 11/12/2023 03:23 AM        Most Recent UA Lab Results   Component Value Date/Time    COLORU YELLOW/STRAW 11/11/2023 04:32 AM    APPEARANCE CLEAR 11/11/2023 04:32 AM    SPECGRAV 1.018 11/11/2023 04:32 AM    LABPH 6.5 11/11/2023 04:32 AM    PROTEINU Negative 11/11/2023 04:32 AM    GLUCOSEU Negative 11/11/2023 04:32 AM    KETUA Negative 11/11/2023
Urine, random Updated: 11/14/23 1540     Sample Site URINE        L pneumophila S1 Ag, Ur Negative        Comment: (NOTE)  Presumptive negative for L. pneumophila serogroup 1 antigen in urine,  suggesting no recent or current infection. Legionnaires' disease  cannot be ruled out since other serogroups and species may also  cause disease. Performed At: [facility] & [address] [id number]  Mert Pleitez MD RP:[id number]         S. pneumonia Antigen, Urine/CSF [[id number]] Collected: 11/11/23 1311    Order Status: Completed Specimen: Miscellaneous sample Updated: 11/14/23 1639     Sample Site URINE        Specimen Urine     S pneumoniae Ag, CSF Negative        FLUID CULTURE Not indicated. Organism Id Not indicated. Please note: Comment        Comment: (NOTE)  College of American [employer]ologists standards require a culture to be  performed on CSF specimens submitted for bacterial antigen testing. (CAP [id number]) Urine specimens will not be cultured.   Performed At: [facility] & [address] [id number]  Mert Pleitez MD RY:[id number]         Respiratory Panel, Molecular, with COVID-19 (Restricted: peds pts or suitable admitted adults) [[id number]] Collected: 11/11/23 0759    Order Status: Completed Specimen: Nasopharyngeal Updated: 11/11/23 1216     Adenovirus by PCR NOT DETECTED        Coronavirus 229E by PCR NOT DETECTED        Coronavirus HKU1 by PCR NOT DETECTED        Coronavirus NL63 by PCR NOT DETECTED        Coronavirus OC43 by PCR NOT DETECTED        SARS-CoV-2, PCR NOT DETECTED        Human Metapneumovirus by PCR NOT DETECTED        Rhinovirus Enterovirus PCR NOT DETECTED        Influenza A by PCR NOT DETECTED        Influenza B PCR NOT DETECTED        Parainfluenza 1 PCR NOT DETECTED        Parainfluenza 2 PCR NOT DETECTED        Parainfluenza 3 PCR NOT DETECTED        Parainfluenza 4 PCR NOT DETECTED        Respiratory Syncytial Virus by PCR NOT

## 2023-11-21 NOTE — PLAN OF CARE
Problem: Discharge Planning  Goal: Discharge to home or other facility with appropriate resources  Outcome: Adequate for Discharge     Problem: Safety - Adult  Goal: Free from fall injury  Outcome: Adequate for Discharge  Flowsheets  Taken 11/21/2023 1216  Free From Fall Injury: Instruct family/caregiver on patient safety  Taken 11/21/2023 0752  Free From Fall Injury: Instruct family/caregiver on patient safety     Problem: ABCDS Injury Assessment  Goal: Absence of physical injury  Outcome: Adequate for Discharge     Problem: Respiratory - Adult  Goal: Achieves optimal ventilation and oxygenation  Outcome: Adequate for Discharge  Flowsheets  Taken 11/21/2023 1214  Achieves optimal ventilation and oxygenation: Assess for changes in respiratory status  Taken 11/21/2023 3188  Achieves optimal ventilation and oxygenation: Assess for changes in respiratory status     Problem: Pain  Goal: Verbalizes/displays adequate comfort level or baseline comfort level  Outcome: Adequate for Discharge  Flowsheets  Taken 11/21/2023 1215  Verbalizes/displays adequate comfort level or baseline comfort level:   Encourage patient to monitor pain and request assistance   Assess pain using appropriate pain scale   Administer analgesics based on type and severity of pain and evaluate response   Implement non-pharmacological measures as appropriate and evaluate response   Consider cultural and social influences on pain and pain management   Notify Licensed Independent Practitioner if interventions unsuccessful or patient reports new pain  Taken 11/21/2023 0752  Verbalizes/displays adequate comfort level or baseline comfort level:   Encourage patient to monitor pain and request assistance   Assess pain using appropriate pain scale   Administer analgesics based on type and severity of pain and evaluate response   Implement non-pharmacological measures as appropriate and evaluate response   Consider cultural and social influences on pain and

## 2023-11-22 NOTE — TELEPHONE ENCOUNTER
Care Transitions Initial Follow Up Call    Outreach made within 2 business days of discharge: Yes    Patient: Janel Ellison Patient : 1940   MRN: [id number]      Reason for Admission:   Principal Problem:    Atrial fibrillation with rapid ventricular response ([address])  Active Problems:    Class 2 severe obesity due to excess calories with serious comorbidity and body mass index (BMI) of 35.0 to 35.9 in adult (HCC)    RLS (restless legs syndrome)    Hypothyroidism (acquired)    Hypertension, essential    GERD (gastroesophageal reflux disease)    Acute respiratory failure with hypoxia ([facility])    Right atrial mass    Asthma    Severe obesity ([address])    Mitral stenosis    Atrial thrombosis    Respiratory failure ([address])    Atrial fibrillation ([address])    Moderate persistent asthma with exacerbation    Discharge Date: 23       Spoke with: Janel Ellison    Discharge department/facility: [facility]    TCM Interactive Patient Contact:  Was patient able to fill all prescriptions: No: pharmacy was not able to fill nebulizer medications due to instructions  Was patient instructed to bring all medications to the follow-up visit: Yes  Is patient taking all medications as directed in the discharge summary?  Yes  Does patient understand their discharge instructions: Yes  Does patient have questions or concerns that need addressed prior to 7-14 day follow up office visit: no    Scheduled appointment with PCP within 7-14 days    Follow Up  Future Appointments   Date Time Provider [address] [place] [address]   2023  1:40 PM Coutu, Severa Koyanagi, APRN - CNP [facility]   1/15/2024 10:30 AM GALA Russell CNP [facility]   2024  9:30 AM GALA Russell CNP [facility]       Merlyn Dela Cruz RN

## 2023-12-06 ENCOUNTER — OFFICE VISIT (OUTPATIENT)
Dept: PULMONOLOGY | Age: 83
End: 2023-12-06
Payer: MEDICARE

## 2023-12-06 VITALS
HEART RATE: 62 BPM | SYSTOLIC BLOOD PRESSURE: 112 MMHG | HEIGHT: 60 IN | WEIGHT: 186 LBS | RESPIRATION RATE: 18 BRPM | DIASTOLIC BLOOD PRESSURE: 70 MMHG | BODY MASS INDEX: 36.52 KG/M2 | OXYGEN SATURATION: 95 %

## 2023-12-06 DIAGNOSIS — K21.9 GASTROESOPHAGEAL REFLUX DISEASE, UNSPECIFIED WHETHER ESOPHAGITIS PRESENT: ICD-10-CM

## 2023-12-06 DIAGNOSIS — Z09 HOSPITAL DISCHARGE FOLLOW-UP: ICD-10-CM

## 2023-12-06 DIAGNOSIS — E66.9 OBESITY, CLASS II, BMI 35-39.9: ICD-10-CM

## 2023-12-06 DIAGNOSIS — J30.9 ALLERGIC RHINITIS, UNSPECIFIED SEASONALITY, UNSPECIFIED TRIGGER: ICD-10-CM

## 2023-12-06 DIAGNOSIS — J45.50 SEVERE PERSISTENT ASTHMA WITHOUT COMPLICATION: Primary | ICD-10-CM

## 2023-12-06 LAB
EXPIRATORY TIME: NORMAL
FEF 25-75% %PRED-PRE: NORMAL
FEF 25-75% PRED: NORMAL
FEF 25-75%-PRE: NORMAL
FEV1 %PRED-PRE: 47 %
FEV1 PRED: NORMAL
FEV1/FVC %PRED-PRE: NORMAL
FEV1/FVC PRED: NORMAL
FEV1/FVC: 55 %
FEV1: 0.72 L
FVC %PRED-PRE: 63 %
FVC PRED: NORMAL
FVC: 1.31 L
PEF %PRED-PRE: NORMAL
PEF PRED: NORMAL
PEF-PRE: NORMAL

## 2023-12-06 PROCEDURE — G8400 PT W/DXA NO RESULTS DOC: HCPCS | Performed by: NURSE PRACTITIONER

## 2023-12-06 PROCEDURE — G8417 CALC BMI ABV UP PARAM F/U: HCPCS | Performed by: NURSE PRACTITIONER

## 2023-12-06 PROCEDURE — 1111F DSCHRG MED/CURRENT MED MERGE: CPT | Performed by: NURSE PRACTITIONER

## 2023-12-06 PROCEDURE — 94010 BREATHING CAPACITY TEST: CPT | Performed by: INTERNAL MEDICINE

## 2023-12-06 PROCEDURE — 3078F DIAST BP <80 MM HG: CPT | Performed by: NURSE PRACTITIONER

## 2023-12-06 PROCEDURE — 1090F PRES/ABSN URINE INCON ASSESS: CPT | Performed by: NURSE PRACTITIONER

## 2023-12-06 PROCEDURE — 99215 OFFICE O/P EST HI 40 MIN: CPT | Performed by: NURSE PRACTITIONER

## 2023-12-06 PROCEDURE — G8427 DOCREV CUR MEDS BY ELIG CLIN: HCPCS | Performed by: NURSE PRACTITIONER

## 2023-12-06 PROCEDURE — 1123F ACP DISCUSS/DSCN MKR DOCD: CPT | Performed by: NURSE PRACTITIONER

## 2023-12-06 PROCEDURE — 3074F SYST BP LT 130 MM HG: CPT | Performed by: NURSE PRACTITIONER

## 2023-12-06 PROCEDURE — 1036F TOBACCO NON-USER: CPT | Performed by: NURSE PRACTITIONER

## 2023-12-06 PROCEDURE — G8484 FLU IMMUNIZE NO ADMIN: HCPCS | Performed by: NURSE PRACTITIONER

## 2023-12-06 RX ORDER — FLUTICASONE PROPIONATE AND SALMETEROL 500; 50 UG/1; UG/1
1 POWDER RESPIRATORY (INHALATION) EVERY 12 HOURS
Qty: 1 EACH | Refills: 11 | Status: SHIPPED | OUTPATIENT
Start: 2023-12-06

## 2023-12-06 RX ORDER — OMEPRAZOLE 40 MG/1
40 CAPSULE, DELAYED RELEASE ORAL
Qty: 90 CAPSULE | Refills: 3 | Status: SHIPPED | OUTPATIENT
Start: 2023-12-06

## 2023-12-06 RX ORDER — MONTELUKAST SODIUM 10 MG/1
10 TABLET ORAL NIGHTLY
Qty: 30 TABLET | Refills: 11 | Status: SHIPPED | OUTPATIENT
Start: 2023-12-06

## 2023-12-06 ASSESSMENT — PULMONARY FUNCTION TESTS
FEV1_PERCENT_PREDICTED_PRE: 47
FVC_PERCENT_PREDICTED_PRE: 63
FEV1: 0.72
FVC: 1.31
FEV1/FVC: 55

## 2023-12-06 NOTE — PROGRESS NOTES
Name:  Jennyfer Saeed  YOB: 1940   MRN: [id number]      Office Visit: 12/6/2023        ASSESSMENT AND PLAN:  (Medical Decision Making)    Impression: 82 y.o. female recently hospitalized for an asthma exacerbation and here for follow-up    1. Severe persistent asthma without complication  --Patient has been on twice daily nebulizers with Xopenex and budesonide. Despite this she still has significant obstruction. Will change to high-dose Advair and follow-up with patient in a few months to see how she is doing  --Can continue albuterol HFA versus Xopenex nebulizer  --She does have a history of sleep apnea intolerant of CPAP. Explained that if she has ongoing symptoms with her asthma and unable to control her any other way, may need to go back to sleep testing versus trying CPAP again  - Spirometry Without Bronchodilator    2. Allergic rhinitis, unspecified seasonality, unspecified trigger  --Start Singulair    3. 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 Gastroesophageal reflux disease, unspecified whether esophagitis present  --Large hiatal hernia. Patient was on omeprazole in the hospital and noticed improvement, so we will get her back on this    Orders Placed This Encounter   Medications    fluticasone-salmeterol (ADVAIR DISKUS) 500-50 MCG/ACT AEPB diskus inhaler     Sig: Inhale 1 puff into the lungs in the morning and 1 puff in the evening. Dispense:  1 each     Refill:  11    montelukast (SINGULAIR) 10 MG tablet     Sig: Take 1 tablet by mouth nightly     Dispense:  30 tablet     Refill:  11    omeprazole (PRILOSEC) 40 MG delayed release capsule     Sig: Take 1 capsule by mouth every morning (before breakfast)     Dispense:  90 capsule     Refill:  3     No orders of the defined types were placed in this encounter. Follow-up and Dispositions    Return in about 4 months (around 4/6/2024) for Yun, asthma.        GALA Vides - CNP    No specialty comments

## 2023-12-15 DIAGNOSIS — I51.89 RIGHT ATRIAL MASS: Primary | ICD-10-CM

## 2024-04-02 ENCOUNTER — HOSPITAL ENCOUNTER (OUTPATIENT)
Dept: MRI IMAGING | Age: 84
Discharge: HOME | End: 2024-04-05
Attending: INTERNAL MEDICINE
Payer: MEDICARE

## 2024-04-02 DIAGNOSIS — I51.89 RIGHT ATRIAL MASS: ICD-10-CM

## 2024-04-02 PROCEDURE — 6360000004 HC RX CONTRAST MEDICATION: Performed by: INTERNAL MEDICINE

## 2024-04-02 PROCEDURE — A9579 GAD-BASE MR CONTRAST NOS,1ML: HCPCS | Performed by: INTERNAL MEDICINE

## 2024-04-02 PROCEDURE — 75561 CARDIAC MRI FOR MORPH W/DYE: CPT

## 2024-04-02 RX ADMIN — GADOTERIDOL 34 ML: 279.3 INJECTION, SOLUTION INTRAVENOUS at 11:33

## 2024-04-08 ENCOUNTER — TELEPHONE (OUTPATIENT)
Age: 84
End: 2024-04-08

## 2024-04-08 DIAGNOSIS — I51.89 ATRIAL MASS: Primary | ICD-10-CM

## 2024-04-19 ENCOUNTER — OFFICE VISIT (OUTPATIENT)
Dept: PULMONOLOGY | Age: 84
End: 2024-04-19

## 2024-04-19 VITALS
HEART RATE: 87 BPM | DIASTOLIC BLOOD PRESSURE: 64 MMHG | OXYGEN SATURATION: 97 % | RESPIRATION RATE: 18 BRPM | WEIGHT: 194 LBS | TEMPERATURE: 97.1 F | HEIGHT: 60 IN | BODY MASS INDEX: 38.09 KG/M2 | SYSTOLIC BLOOD PRESSURE: 116 MMHG

## 2024-04-19 DIAGNOSIS — E66.01 SEVERE OBESITY (BMI 35.0-39.9) WITH COMORBIDITY (HCC): ICD-10-CM

## 2024-04-19 DIAGNOSIS — J45.50 SEVERE PERSISTENT ASTHMA WITHOUT COMPLICATION: ICD-10-CM

## 2024-04-19 DIAGNOSIS — Z63.8 POOR ATTACHMENT TO PRIMARY CAREGIVER: Primary | ICD-10-CM

## 2024-04-19 RX ORDER — CITALOPRAM 40 MG/1
40 TABLET ORAL DAILY
Qty: 90 TABLET | Refills: 3 | Status: SHIPPED | OUTPATIENT
Start: 2024-04-19

## 2024-04-19 SDOH — SOCIAL STABILITY - SOCIAL INSECURITY: OTHER SPECIFIED PROBLEMS RELATED TO PRIMARY SUPPORT GROUP: Z63.8

## 2024-04-19 NOTE — PROGRESS NOTES
Name:  Jeanne Nieto  YOB: 1940   MRN: [id number]      Office Visit: 4/19/2024        ASSESSMENT AND PLAN:  (Medical Decision Making)    Impression: 83 y.o. female recently hospitalized for an asthma exacerbation and here for follow-up    1. Severe persistent asthma without complication  --Doing much better on the high-dose Wixela (she gets it from [facility])  --Can continue albuterol HFA versus Xopenex nebulizer  --She does have a history of sleep apnea intolerant of CPAP.  Explained that if she has ongoing symptoms with her asthma and unable to control her any other way, may need to go back to sleep testing versus trying CPAP again  - Spirometry Without Bronchodilator    2. Allergic rhinitis, unspecified seasonality, unspecified trigger  --continue Singulair    3. 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 Gastroesophageal reflux disease, unspecified whether esophagitis present  --Large hiatal hernia.  Patient was on omeprazole in the hospital and noticed improvement, so we will get her back on this    No orders of the defined types were placed in this encounter.    No orders of the defined types were placed in this encounter.        Kaylee Martinez, APRN - CNP    No specialty comments available.    Collaborating physician is Joana Naidu MD    __    HISTORY OF PRESENT ILLNESS:    Ms. Jeanne Nieto is a 83 y.o. female who is seen at [facility] today for  Follow-up and Asthma   Ms Nieto is here today for follow-up of her asthma.  She was started on high-dose Wixela at her last visit and states she is doing much better.  She even avoided viral infection that could have caused a typical upper respiratory infection for 5.  She uses her nebulizer very sparingly at this point.  She continues on her allergy medications.   She has a significant history of asthma diagnosed around the age of 57.  She was recently admitted to [facility] for an asthma exacerbation where we did see her while

## 2024-08-09 RX ORDER — ATORVASTATIN CALCIUM 40 MG/1
40 TABLET, FILM COATED ORAL DAILY
COMMUNITY
Start: 2009-08-27

## 2024-08-09 RX ORDER — TRAZODONE HYDROCHLORIDE 100 MG/1
50-100 TABLET ORAL NIGHTLY
COMMUNITY
Start: 2024-01-26

## 2024-08-12 ENCOUNTER — OFFICE VISIT (OUTPATIENT)
Dept: PRIMARY CARE CLINIC | Facility: CLINIC | Age: 84
End: 2024-08-12

## 2024-08-12 DIAGNOSIS — I35.0 AORTIC VALVE STENOSIS, ETIOLOGY OF CARDIAC VALVE DISEASE UNSPECIFIED: ICD-10-CM

## 2024-08-12 DIAGNOSIS — F41.9 ANXIETY AND DEPRESSION: ICD-10-CM

## 2024-08-12 DIAGNOSIS — I48.0 PAROXYSMAL ATRIAL FIBRILLATION (HCC): ICD-10-CM

## 2024-08-12 DIAGNOSIS — E03.9 HYPOTHYROIDISM (ACQUIRED): ICD-10-CM

## 2024-08-12 DIAGNOSIS — D50.0 IRON DEFICIENCY ANEMIA DUE TO CHRONIC BLOOD LOSS: Chronic | ICD-10-CM

## 2024-08-12 DIAGNOSIS — K21.9 GASTROESOPHAGEAL REFLUX DISEASE, UNSPECIFIED WHETHER ESOPHAGITIS PRESENT: ICD-10-CM

## 2024-08-12 DIAGNOSIS — F32.A ANXIETY AND DEPRESSION: ICD-10-CM

## 2024-08-12 DIAGNOSIS — D68.69 SECONDARY HYPERCOAGULABLE STATE (HCC): ICD-10-CM

## 2024-08-12 DIAGNOSIS — E66.01 CLASS 2 SEVERE OBESITY DUE TO EXCESS CALORIES WITH SERIOUS COMORBIDITY AND BODY MASS INDEX (BMI) OF 35.0 TO 35.9 IN ADULT (HCC): Chronic | ICD-10-CM

## 2024-08-12 DIAGNOSIS — M15.9 PRIMARY OSTEOARTHRITIS INVOLVING MULTIPLE JOINTS: ICD-10-CM

## 2024-08-12 DIAGNOSIS — Z00.00 ENCOUNTER FOR MEDICAL EXAMINATION TO ESTABLISH CARE: ICD-10-CM

## 2024-08-12 DIAGNOSIS — F33.42 RECURRENT MAJOR DEPRESSIVE DISORDER, IN FULL REMISSION (HCC): ICD-10-CM

## 2024-08-12 DIAGNOSIS — I48.91 ATRIAL FIBRILLATION WITH RAPID VENTRICULAR RESPONSE (HCC): ICD-10-CM

## 2024-08-12 DIAGNOSIS — I05.0 MODERATE MITRAL VALVE STENOSIS: Primary | ICD-10-CM

## 2024-08-12 DIAGNOSIS — J45.51 SEVERE PERSISTENT ASTHMA WITH ACUTE EXACERBATION: Chronic | ICD-10-CM

## 2024-08-12 DIAGNOSIS — I51.3 ATRIAL THROMBOSIS: Chronic | ICD-10-CM

## 2024-08-12 LAB — TSH, 3RD GENERATION: 7.67 UIU/ML (ref 0.27–4.2)

## 2024-08-12 RX ORDER — DILTIAZEM HYDROCHLORIDE 240 MG/1
240 CAPSULE, COATED, EXTENDED RELEASE ORAL DAILY
Qty: 90 CAPSULE | Refills: 1 | Status: CANCELLED | OUTPATIENT
Start: 2024-08-12

## 2024-08-12 RX ORDER — ATORVASTATIN CALCIUM 40 MG/1
40 TABLET, FILM COATED ORAL DAILY
Qty: 90 TABLET | Refills: 0 | Status: CANCELLED | OUTPATIENT
Start: 2024-08-12

## 2024-08-12 RX ORDER — CITALOPRAM 20 MG/1
20 TABLET ORAL DAILY
Qty: 90 TABLET | Refills: 1 | Status: SHIPPED | OUTPATIENT
Start: 2024-08-12

## 2024-08-12 RX ORDER — TRAZODONE HYDROCHLORIDE 100 MG/1
50-100 TABLET ORAL NIGHTLY
Qty: 90 TABLET | Refills: 0 | Status: CANCELLED | OUTPATIENT
Start: 2024-08-12

## 2024-08-12 RX ORDER — OMEPRAZOLE 40 MG/1
40 CAPSULE, DELAYED RELEASE ORAL
Qty: 90 CAPSULE | Refills: 0 | Status: CANCELLED | OUTPATIENT
Start: 2024-08-12

## 2024-08-12 RX ORDER — CITALOPRAM 40 MG/1
40 TABLET ORAL DAILY
Qty: 90 TABLET | Refills: 0 | Status: CANCELLED | OUTPATIENT
Start: 2024-08-12

## 2024-08-12 RX ORDER — LEVOTHYROXINE SODIUM 137 UG/1
137 TABLET ORAL DAILY
Qty: 90 TABLET | Refills: 1 | Status: SHIPPED | OUTPATIENT
Start: 2024-08-12

## 2024-08-12 RX ORDER — LEVOTHYROXINE SODIUM 137 UG/1
137 TABLET ORAL DAILY
Qty: 90 TABLET | Refills: 0 | Status: CANCELLED | OUTPATIENT
Start: 2024-08-12

## 2024-08-12 SDOH — ECONOMIC STABILITY: FOOD INSECURITY: WITHIN THE PAST 12 MONTHS, THE FOOD YOU BOUGHT JUST DIDN'T LAST AND YOU DIDN'T HAVE MONEY TO GET MORE.: NEVER TRUE

## 2024-08-12 SDOH — ECONOMIC STABILITY: FOOD INSECURITY: WITHIN THE PAST 12 MONTHS, YOU WORRIED THAT YOUR FOOD WOULD RUN OUT BEFORE YOU GOT MONEY TO BUY MORE.: NEVER TRUE

## 2024-08-12 SDOH — ECONOMIC STABILITY: INCOME INSECURITY: HOW HARD IS IT FOR YOU TO PAY FOR THE VERY BASICS LIKE FOOD, HOUSING, MEDICAL CARE, AND HEATING?: NOT HARD AT ALL

## 2024-08-12 ASSESSMENT — PATIENT HEALTH QUESTIONNAIRE - PHQ9
7. TROUBLE CONCENTRATING ON THINGS, SUCH AS READING THE NEWSPAPER OR WATCHING TELEVISION: NOT AT ALL
3. TROUBLE FALLING OR STAYING ASLEEP: NOT AT ALL
SUM OF ALL RESPONSES TO PHQ QUESTIONS 1-9: 0
9. THOUGHTS THAT YOU WOULD BE BETTER OFF DEAD, OR OF HURTING YOURSELF: NOT AT ALL
SUM OF ALL RESPONSES TO PHQ9 QUESTIONS 1 & 2: 0
10. IF YOU CHECKED OFF ANY PROBLEMS, HOW DIFFICULT HAVE THESE PROBLEMS MADE IT FOR YOU TO DO YOUR WORK, TAKE CARE OF THINGS AT HOME, OR GET ALONG WITH OTHER PEOPLE: NOT DIFFICULT AT ALL
SUM OF ALL RESPONSES TO PHQ QUESTIONS 1-9: 0
6. FEELING BAD ABOUT YOURSELF - OR THAT YOU ARE A FAILURE OR HAVE LET YOURSELF OR YOUR FAMILY DOWN: NOT AT ALL
5. POOR APPETITE OR OVEREATING: NOT AT ALL
2. FEELING DOWN, DEPRESSED OR HOPELESS: NOT AT ALL
SUM OF ALL RESPONSES TO PHQ QUESTIONS 1-9: 0
4. FEELING TIRED OR HAVING LITTLE ENERGY: NOT AT ALL
8. MOVING OR SPEAKING SO SLOWLY THAT OTHER PEOPLE COULD HAVE NOTICED. OR THE OPPOSITE, BEING SO FIGETY OR RESTLESS THAT YOU HAVE BEEN MOVING AROUND A LOT MORE THAN USUAL: NOT AT ALL
SUM OF ALL RESPONSES TO PHQ QUESTIONS 1-9: 0
1. LITTLE INTEREST OR PLEASURE IN DOING THINGS: NOT AT ALL

## 2024-08-12 ASSESSMENT — ENCOUNTER SYMPTOMS
GASTROINTESTINAL NEGATIVE: 1
RESPIRATORY NEGATIVE: 1

## 2024-08-12 NOTE — PROGRESS NOTES
myocardial late gadolinium enhancement to suggest prior  myocardial infarction. Presence of infiltrative cardiomyopathy can not be  excluded based on this study.      5. Moderate aortic stenosis. Correlation with echocardiogram recommended.      6. Moderate appearing mitral stenosis. Correlation with echocardiogram  recommended.     10. Atrial fibrillation with rapid ventricular response (HCC)  11. Secondary hypercoagulable state (HCC)  12. Aortic valve stenosis, etiology of cardiac valve disease unspecified  13. Gastroesophageal reflux disease, unspecified whether esophagitis present     Patient advised to contact Cardiology for warfin is not taking  Monitor home BP       Patient was  combative and argumentative  with nurse.  She states she does not take meds and she is not like him with the nurse however when I went in she states she was not sure Asking Why We Could Not See Her Old Records.  When she was combative and raised her voice with  me I got a chaperone In my nurse.  Got up Multiple Times to Leave the Visit but Then Sat down and Apologized Stated That I Am Her Third PCP within 6 Months She Is Trying to Find the Right Fit.  She Stated She Moved from [place] She Is Not Sure If She Wants to Follow Back up with Us Said That We Might Not Be the Best Fit.  I Advised We Were Just Trying to Help Her and That Her Not Being on Her Coumadin As Well As Monitoring Blood Pressure Could Be Dangerous.  Patient States She Did Not like Taking Meds with My Nurse However to Me Stated She Did Not Know She Was Was to Be on Clinic on Coumadin.  Her Last PCP in January Had Referred Her to Coumadin Clinic for Atrial Fibrillation Patient Again Urged Strongly to Contact Cardiology Given His for A-Fib and the Risks of Not Taking It Patient Also Advised That She Needs to Monitor Her Home Blood Pressure and Take It to Her Next PCP.  Towards the End of the Visit the Patient Apologized However States That Me Know If She Is Looking for

## 2024-08-13 VITALS
OXYGEN SATURATION: 96 % | WEIGHT: 206 LBS | DIASTOLIC BLOOD PRESSURE: 81 MMHG | HEART RATE: 66 BPM | SYSTOLIC BLOOD PRESSURE: 124 MMHG | HEIGHT: 60 IN | BODY MASS INDEX: 40.44 KG/M2

## 2024-09-11 PROBLEM — Z00.00 ENCOUNTER FOR MEDICAL EXAMINATION TO ESTABLISH CARE: Status: RESOLVED | Noted: 2024-08-12 | Resolved: 2024-09-11

## 2024-09-24 ENCOUNTER — APPOINTMENT (OUTPATIENT)
Dept: GENERAL RADIOLOGY | Age: 84
End: 2024-09-24
Payer: MEDICARE

## 2024-09-24 ENCOUNTER — HOSPITAL ENCOUNTER (INPATIENT)
Age: 84
LOS: 4 days | Discharge: HOME HEALTH | End: 2024-09-28
Attending: EMERGENCY MEDICINE
Payer: MEDICARE

## 2024-09-24 ENCOUNTER — APPOINTMENT (OUTPATIENT)
Dept: NON INVASIVE DIAGNOSTICS | Age: 84
End: 2024-09-24
Payer: MEDICARE

## 2024-09-24 ENCOUNTER — APPOINTMENT (OUTPATIENT)
Dept: CT IMAGING | Age: 84
End: 2024-09-24
Payer: MEDICARE

## 2024-09-24 DIAGNOSIS — J96.02 ACUTE RESPIRATORY FAILURE WITH HYPOXIA AND HYPERCAPNIA: Primary | ICD-10-CM

## 2024-09-24 DIAGNOSIS — J96.01 ACUTE RESPIRATORY FAILURE WITH HYPOXIA AND HYPERCAPNIA: Primary | ICD-10-CM

## 2024-09-24 DIAGNOSIS — J45.51 SEVERE PERSISTENT ASTHMA WITH EXACERBATION: ICD-10-CM

## 2024-09-24 DIAGNOSIS — I48.0 PAROXYSMAL ATRIAL FIBRILLATION (HCC): ICD-10-CM

## 2024-09-24 PROBLEM — J96.22 ACUTE ON CHRONIC RESPIRATORY FAILURE WITH HYPERCAPNIA: Status: ACTIVE | Noted: 2024-09-24

## 2024-09-24 LAB
ALBUMIN SERPL-MCNC: 3.7 G/DL (ref 3.2–4.6)
ALBUMIN/GLOB SERPL: 1.2 (ref 1–1.9)
ALP SERPL-CCNC: 89 U/L (ref 35–104)
ALT SERPL-CCNC: 22 U/L (ref 8–45)
ANION GAP BLD CALC-SCNC: ABNORMAL MMOL/L
ANION GAP SERPL CALC-SCNC: 11 MMOL/L (ref 9–18)
ARTERIAL PATENCY WRIST A: POSITIVE
ARTERIAL PATENCY WRIST A: POSITIVE
AST SERPL-CCNC: 44 U/L (ref 15–37)
B PERT DNA SPEC QL NAA+PROBE: NOT DETECTED
BASE EXCESS BLD CALC-SCNC: 3.3 MMOL/L
BASE EXCESS BLD CALC-SCNC: 4.3 MMOL/L
BASOPHILS # BLD: 0.1 K/UL (ref 0–0.2)
BASOPHILS NFR BLD: 1 % (ref 0–2)
BDY SITE: ABNORMAL
BDY SITE: ABNORMAL
BILIRUB SERPL-MCNC: 0.3 MG/DL (ref 0–1.2)
BORDETELLA PARAPERTUSSIS BY PCR: NOT DETECTED
BUN SERPL-MCNC: 24 MG/DL (ref 8–23)
C PNEUM DNA SPEC QL NAA+PROBE: NOT DETECTED
CA-I BLD-MCNC: 1.36 MMOL/L (ref 1.12–1.32)
CALCIUM SERPL-MCNC: 9.9 MG/DL (ref 8.8–10.2)
CHLORIDE SERPL-SCNC: 103 MMOL/L (ref 98–107)
CO2 BLD-SCNC: 34 MMOL/L (ref 13–23)
CO2 SERPL-SCNC: 28 MMOL/L (ref 20–28)
CREAT SERPL-MCNC: 0.95 MG/DL (ref 0.6–1.1)
DIFFERENTIAL METHOD BLD: ABNORMAL
ECHO AO ASC DIAM: 3.2 CM
ECHO AO ASCENDING AORTA INDEX: 1.67 CM/M2
ECHO AO ROOT DIAM: 3 CM
ECHO AO ROOT INDEX: 1.56 CM/M2
ECHO AV AREA PEAK VELOCITY: 2 CM2
ECHO AV AREA VTI: 2.2 CM2
ECHO AV AREA/BSA PEAK VELOCITY: 1 CM2/M2
ECHO AV AREA/BSA VTI: 1.1 CM2/M2
ECHO AV MEAN GRADIENT: 26 MMHG
ECHO AV MEAN VELOCITY: 2.4 M/S
ECHO AV PEAK GRADIENT: 54 MMHG
ECHO AV PEAK VELOCITY: 3.7 M/S
ECHO AV VELOCITY RATIO: 0.62
ECHO AV VTI: 70.1 CM
ECHO BSA: 2.02 M2
ECHO EST RA PRESSURE: 10 MMHG
ECHO IVC PROX: 2.8 CM
ECHO LA AREA 2C: 22 CM2
ECHO LA AREA 4C: 27.8 CM2
ECHO LA DIAMETER INDEX: 2.08 CM/M2
ECHO LA DIAMETER: 4 CM
ECHO LA MAJOR AXIS: 6.8 CM
ECHO LA MINOR AXIS: 6.2 CM
ECHO LA TO AORTIC ROOT RATIO: 1.33
ECHO LA VOL BP: 80 ML (ref 22–52)
ECHO LA VOL MOD A2C: 64 ML (ref 22–52)
ECHO LA VOL MOD A4C: 92 ML (ref 22–52)
ECHO LA VOL/BSA BIPLANE: 42 ML/M2 (ref 16–34)
ECHO LA VOLUME INDEX MOD A2C: 33 ML/M2 (ref 16–34)
ECHO LA VOLUME INDEX MOD A4C: 48 ML/M2 (ref 16–34)
ECHO LV E' LATERAL VELOCITY: 10.5 CM/S
ECHO LV E' SEPTAL VELOCITY: 6.7 CM/S
ECHO LV EDV A2C: 98 ML
ECHO LV EDV A4C: 81 ML
ECHO LV EDV INDEX A4C: 42 ML/M2
ECHO LV EDV NDEX A2C: 51 ML/M2
ECHO LV EF PHYSICIAN: 75 %
ECHO LV EJECTION FRACTION A2C: 62 %
ECHO LV EJECTION FRACTION A4C: 65 %
ECHO LV EJECTION FRACTION BIPLANE: 63 % (ref 55–100)
ECHO LV ESV A2C: 37 ML
ECHO LV ESV A4C: 28 ML
ECHO LV ESV INDEX A2C: 19 ML/M2
ECHO LV ESV INDEX A4C: 15 ML/M2
ECHO LV FRACTIONAL SHORTENING: 36 % (ref 28–44)
ECHO LV INTERNAL DIMENSION DIASTOLE INDEX: 2.29 CM/M2
ECHO LV INTERNAL DIMENSION DIASTOLIC: 4.4 CM (ref 3.9–5.3)
ECHO LV INTERNAL DIMENSION SYSTOLIC INDEX: 1.46 CM/M2
ECHO LV INTERNAL DIMENSION SYSTOLIC: 2.8 CM
ECHO LV IVSD: 1 CM (ref 0.6–0.9)
ECHO LV MASS 2D: 137.8 G (ref 67–162)
ECHO LV MASS INDEX 2D: 71.8 G/M2 (ref 43–95)
ECHO LV POSTERIOR WALL DIASTOLIC: 0.9 CM (ref 0.6–0.9)
ECHO LV RELATIVE WALL THICKNESS RATIO: 0.41
ECHO LVOT AREA: 3.1 CM2
ECHO LVOT AV VTI INDEX: 0.7
ECHO LVOT DIAM: 2 CM
ECHO LVOT MEAN GRADIENT: 12 MMHG
ECHO LVOT PEAK GRADIENT: 22 MMHG
ECHO LVOT PEAK VELOCITY: 2.3 M/S
ECHO LVOT STROKE VOLUME INDEX: 80.6 ML/M2
ECHO LVOT SV: 154.8 ML
ECHO LVOT VTI: 49.3 CM
ECHO MV A VELOCITY: 2.2 M/S
ECHO MV AREA VTI: 2 CM2
ECHO MV E DECELERATION TIME (DT): 348 MS
ECHO MV E VELOCITY: 1.94 M/S
ECHO MV E/A RATIO: 0.88
ECHO MV E/E' LATERAL: 18.48
ECHO MV E/E' RATIO (AVERAGED): 23.72
ECHO MV E/E' SEPTAL: 28.96
ECHO MV LVOT VTI INDEX: 1.59
ECHO MV MAX VELOCITY: 2.3 M/S
ECHO MV MEAN GRADIENT: 13 MMHG
ECHO MV MEAN VELOCITY: 1.7 M/S
ECHO MV PEAK GRADIENT: 21 MMHG
ECHO MV VTI: 78.2 CM
ECHO PV MAX VELOCITY: 1.8 M/S
ECHO PV PEAK GRADIENT: 13 MMHG
ECHO RIGHT VENTRICULAR SYSTOLIC PRESSURE (RVSP): 49 MMHG
ECHO RV TAPSE: 2.4 CM (ref 1.7–?)
ECHO TV REGURGITANT MAX VELOCITY: 3.11 M/S
ECHO TV REGURGITANT PEAK GRADIENT: 39 MMHG
EKG ATRIAL RATE: 173 BPM
EKG ATRIAL RATE: 87 BPM
EKG ATRIAL RATE: 90 BPM
EKG DIAGNOSIS: NORMAL
EKG P AXIS: 0 DEGREES
EKG P AXIS: 64 DEGREES
EKG P AXIS: 69 DEGREES
EKG P-R INTERVAL: 158 MS
EKG P-R INTERVAL: 160 MS
EKG Q-T INTERVAL: 258 MS
EKG Q-T INTERVAL: 367 MS
EKG Q-T INTERVAL: 368 MS
EKG QRS DURATION: 100 MS
EKG QRS DURATION: 90 MS
EKG QRS DURATION: 92 MS
EKG QTC CALCULATION (BAZETT): 436 MS
EKG QTC CALCULATION (BAZETT): 442 MS
EKG QTC CALCULATION (BAZETT): 449 MS
EKG R AXIS: 11 DEGREES
EKG R AXIS: 17 DEGREES
EKG R AXIS: 9 DEGREES
EKG T AXIS: 102 DEGREES
EKG T AXIS: 167 DEGREES
EKG T AXIS: 88 DEGREES
EKG VENTRICULAR RATE: 171 BPM
EKG VENTRICULAR RATE: 87 BPM
EKG VENTRICULAR RATE: 90 BPM
EOSINOPHIL # BLD: 0.9 K/UL (ref 0–0.8)
EOSINOPHIL NFR BLD: 10 % (ref 0.5–7.8)
ERYTHROCYTE [DISTWIDTH] IN BLOOD BY AUTOMATED COUNT: 14.6 % (ref 11.9–14.6)
FIO2 ON VENT: 50 %
FLUAV SUBTYP SPEC NAA+PROBE: NOT DETECTED
FLUBV RNA SPEC QL NAA+PROBE: NOT DETECTED
GAS FLOW.O2 O2 DELIVERY SYS: ABNORMAL
GAS FLOW.O2 O2 DELIVERY SYS: ABNORMAL
GLOBULIN SER CALC-MCNC: 3 G/DL (ref 2.3–3.5)
GLUCOSE BLD STRIP.AUTO-MCNC: 158 MG/DL (ref 65–100)
GLUCOSE SERPL-MCNC: 153 MG/DL (ref 70–99)
HADV DNA SPEC QL NAA+PROBE: NOT DETECTED
HCO3 BLD-SCNC: 32.2 MMOL/L (ref 22–26)
HCO3 BLD-SCNC: 33 MMOL/L (ref 22–26)
HCOV 229E RNA SPEC QL NAA+PROBE: NOT DETECTED
HCOV HKU1 RNA SPEC QL NAA+PROBE: NOT DETECTED
HCOV NL63 RNA SPEC QL NAA+PROBE: NOT DETECTED
HCOV OC43 RNA SPEC QL NAA+PROBE: NOT DETECTED
HCT VFR BLD AUTO: 39.4 % (ref 35.8–46.3)
HGB BLD-MCNC: 11.9 G/DL (ref 11.7–15.4)
HMPV RNA SPEC QL NAA+PROBE: NOT DETECTED
HPIV1 RNA SPEC QL NAA+PROBE: NOT DETECTED
HPIV2 RNA SPEC QL NAA+PROBE: NOT DETECTED
HPIV3 RNA SPEC QL NAA+PROBE: NOT DETECTED
HPIV4 RNA SPEC QL NAA+PROBE: NOT DETECTED
IMM GRANULOCYTES # BLD AUTO: 0 K/UL (ref 0–0.5)
IMM GRANULOCYTES NFR BLD AUTO: 0 % (ref 0–5)
INR PPP: 0.9
IPAP/PIP: 18
LYMPHOCYTES # BLD: 3.7 K/UL (ref 0.5–4.6)
LYMPHOCYTES NFR BLD: 38 % (ref 13–44)
M PNEUMO DNA SPEC QL NAA+PROBE: NOT DETECTED
MAGNESIUM SERPL-MCNC: 2 MG/DL (ref 1.8–2.4)
MCH RBC QN AUTO: 28.4 PG (ref 26.1–32.9)
MCHC RBC AUTO-ENTMCNC: 30.2 G/DL (ref 31.4–35)
MCV RBC AUTO: 94 FL (ref 82–102)
MONOCYTES # BLD: 0.8 K/UL (ref 0.1–1.3)
MONOCYTES NFR BLD: 8 % (ref 4–12)
NEUTS SEG # BLD: 4.1 K/UL (ref 1.7–8.2)
NEUTS SEG NFR BLD: 43 % (ref 43–78)
NRBC # BLD: 0 K/UL (ref 0–0.2)
NT PRO BNP: 467 PG/ML (ref 0–450)
O2/TOTAL GAS SETTING VFR VENT: 28 %
PCO2 BLD: 63.3 MMHG (ref 35–45)
PCO2 BLD: 76.2 MMHG (ref 35–45)
PH BLD: 7.24 (ref 7.35–7.45)
PH BLD: 7.31 (ref 7.35–7.45)
PLATELET # BLD AUTO: 270 K/UL (ref 150–450)
PMV BLD AUTO: 9.3 FL (ref 9.4–12.3)
PO2 BLD: 159 MMHG (ref 75–100)
PO2 BLD: 79 MMHG (ref 75–100)
POTASSIUM BLD-SCNC: 4 MMOL/L (ref 3.5–5.1)
POTASSIUM SERPL-SCNC: 4.2 MMOL/L (ref 3.5–5.1)
PROT SERPL-MCNC: 6.7 G/DL (ref 6.3–8.2)
PROTHROMBIN TIME: 13 SEC (ref 11.3–14.9)
RBC # BLD AUTO: 4.19 M/UL (ref 4.05–5.2)
RESPIRATORY RATE, POC: 26 (ref 5–40)
RESPIRATORY RATE, POC: 31 (ref 5–40)
RESPIRATORY RATE: 16
RSV RNA SPEC QL NAA+PROBE: NOT DETECTED
RV+EV RNA SPEC QL NAA+PROBE: NOT DETECTED
SAO2 % BLD: 93.9 % (ref 95–98)
SAO2 % BLD: 99 %
SARS-COV-2 RNA RESP QL NAA+PROBE: NOT DETECTED
SERVICE CMNT-IMP: ABNORMAL
SODIUM BLD-SCNC: 142 MMOL/L (ref 136–145)
SODIUM SERPL-SCNC: 141 MMOL/L (ref 136–145)
SPECIMEN SITE: ABNORMAL
SPECIMEN TYPE: ABNORMAL
TROPONIN T SERPL HS-MCNC: 18 NG/L (ref 0–14)
VENTILATION MODE VENT: ABNORMAL
VENTILATION MODE VENT: ABNORMAL
WBC # BLD AUTO: 9.6 K/UL (ref 4.3–11.1)

## 2024-09-24 PROCEDURE — 6370000000 HC RX 637 (ALT 250 FOR IP): Performed by: PHYSICIAN ASSISTANT

## 2024-09-24 PROCEDURE — 6370000000 HC RX 637 (ALT 250 FOR IP): Performed by: FAMILY MEDICINE

## 2024-09-24 PROCEDURE — 83735 ASSAY OF MAGNESIUM: CPT

## 2024-09-24 PROCEDURE — 93005 ELECTROCARDIOGRAM TRACING: CPT | Performed by: EMERGENCY MEDICINE

## 2024-09-24 PROCEDURE — 94640 AIRWAY INHALATION TREATMENT: CPT

## 2024-09-24 PROCEDURE — 6370000000 HC RX 637 (ALT 250 FOR IP)

## 2024-09-24 PROCEDURE — 71260 CT THORAX DX C+: CPT

## 2024-09-24 PROCEDURE — 82803 BLOOD GASES ANY COMBINATION: CPT

## 2024-09-24 PROCEDURE — 85025 COMPLETE CBC W/AUTO DIFF WBC: CPT

## 2024-09-24 PROCEDURE — 93010 ELECTROCARDIOGRAM REPORT: CPT | Performed by: INTERNAL MEDICINE

## 2024-09-24 PROCEDURE — 6370000000 HC RX 637 (ALT 250 FOR IP): Performed by: NURSE PRACTITIONER

## 2024-09-24 PROCEDURE — 0202U NFCT DS 22 TRGT SARS-COV-2: CPT

## 2024-09-24 PROCEDURE — 82947 ASSAY GLUCOSE BLOOD QUANT: CPT

## 2024-09-24 PROCEDURE — 71045 X-RAY EXAM CHEST 1 VIEW: CPT

## 2024-09-24 PROCEDURE — 6360000004 HC RX CONTRAST MEDICATION

## 2024-09-24 PROCEDURE — 82330 ASSAY OF CALCIUM: CPT

## 2024-09-24 PROCEDURE — 84484 ASSAY OF TROPONIN QUANT: CPT

## 2024-09-24 PROCEDURE — 80053 COMPREHEN METABOLIC PANEL: CPT

## 2024-09-24 PROCEDURE — 84132 ASSAY OF SERUM POTASSIUM: CPT

## 2024-09-24 PROCEDURE — 36600 WITHDRAWAL OF ARTERIAL BLOOD: CPT

## 2024-09-24 PROCEDURE — C8929 TTE W OR WO FOL WCON,DOPPLER: HCPCS

## 2024-09-24 PROCEDURE — 6360000002 HC RX W HCPCS: Performed by: EMERGENCY MEDICINE

## 2024-09-24 PROCEDURE — 6360000002 HC RX W HCPCS

## 2024-09-24 PROCEDURE — 2580000003 HC RX 258

## 2024-09-24 PROCEDURE — 5A09357 ASSISTANCE WITH RESPIRATORY VENTILATION, LESS THAN 24 CONSECUTIVE HOURS, CONTINUOUS POSITIVE AIRWAY PRESSURE: ICD-10-PCS | Performed by: STUDENT IN AN ORGANIZED HEALTH CARE EDUCATION/TRAINING PROGRAM

## 2024-09-24 PROCEDURE — 5A0935A ASSISTANCE WITH RESPIRATORY VENTILATION, LESS THAN 24 CONSECUTIVE HOURS, HIGH NASAL FLOW/VELOCITY: ICD-10-PCS | Performed by: STUDENT IN AN ORGANIZED HEALTH CARE EDUCATION/TRAINING PROGRAM

## 2024-09-24 PROCEDURE — 94760 N-INVAS EAR/PLS OXIMETRY 1: CPT

## 2024-09-24 PROCEDURE — 1100000003 HC PRIVATE W/ TELEMETRY

## 2024-09-24 PROCEDURE — 99291 CRITICAL CARE FIRST HOUR: CPT

## 2024-09-24 PROCEDURE — 93005 ELECTROCARDIOGRAM TRACING: CPT

## 2024-09-24 PROCEDURE — 6360000004 HC RX CONTRAST MEDICATION: Performed by: PHYSICIAN ASSISTANT

## 2024-09-24 PROCEDURE — 93306 TTE W/DOPPLER COMPLETE: CPT | Performed by: INTERNAL MEDICINE

## 2024-09-24 PROCEDURE — 84295 ASSAY OF SERUM SODIUM: CPT

## 2024-09-24 PROCEDURE — 94660 CPAP INITIATION&MGMT: CPT

## 2024-09-24 PROCEDURE — 96365 THER/PROPH/DIAG IV INF INIT: CPT

## 2024-09-24 PROCEDURE — 99223 1ST HOSP IP/OBS HIGH 75: CPT | Performed by: INTERNAL MEDICINE

## 2024-09-24 PROCEDURE — 83880 ASSAY OF NATRIURETIC PEPTIDE: CPT

## 2024-09-24 PROCEDURE — 2700000000 HC OXYGEN THERAPY PER DAY

## 2024-09-24 PROCEDURE — 85610 PROTHROMBIN TIME: CPT

## 2024-09-24 PROCEDURE — 2580000003 HC RX 258: Performed by: PHYSICIAN ASSISTANT

## 2024-09-24 RX ORDER — MONTELUKAST SODIUM 10 MG/1
10 TABLET ORAL NIGHTLY
Status: DISCONTINUED | OUTPATIENT
Start: 2024-09-24 | End: 2024-09-28 | Stop reason: HOSPADM

## 2024-09-24 RX ORDER — ACETAMINOPHEN 650 MG/1
650 SUPPOSITORY RECTAL EVERY 6 HOURS PRN
Status: DISCONTINUED | OUTPATIENT
Start: 2024-09-24 | End: 2024-09-28 | Stop reason: HOSPADM

## 2024-09-24 RX ORDER — IOPAMIDOL 755 MG/ML
75 INJECTION, SOLUTION INTRAVASCULAR
Status: COMPLETED | OUTPATIENT
Start: 2024-09-24 | End: 2024-09-24

## 2024-09-24 RX ORDER — POLYETHYLENE GLYCOL 3350 17 G/17G
17 POWDER, FOR SOLUTION ORAL DAILY PRN
Status: DISCONTINUED | OUTPATIENT
Start: 2024-09-24 | End: 2024-09-28 | Stop reason: HOSPADM

## 2024-09-24 RX ORDER — ALBUTEROL SULFATE 5 MG/ML
10 SOLUTION RESPIRATORY (INHALATION)
Status: COMPLETED | OUTPATIENT
Start: 2024-09-24 | End: 2024-09-24

## 2024-09-24 RX ORDER — MAGNESIUM SULFATE IN WATER 40 MG/ML
2000 INJECTION, SOLUTION INTRAVENOUS ONCE
Status: COMPLETED | OUTPATIENT
Start: 2024-09-24 | End: 2024-09-24

## 2024-09-24 RX ORDER — ALBUTEROL SULFATE 0.83 MG/ML
2.5 SOLUTION RESPIRATORY (INHALATION)
Status: DISCONTINUED | OUTPATIENT
Start: 2024-09-24 | End: 2024-09-27

## 2024-09-24 RX ORDER — ENOXAPARIN SODIUM 100 MG/ML
40 INJECTION SUBCUTANEOUS DAILY
Status: DISCONTINUED | OUTPATIENT
Start: 2024-09-24 | End: 2024-09-25

## 2024-09-24 RX ORDER — SODIUM CHLORIDE 0.9 % (FLUSH) 0.9 %
5-40 SYRINGE (ML) INJECTION EVERY 12 HOURS SCHEDULED
Status: DISCONTINUED | OUTPATIENT
Start: 2024-09-24 | End: 2024-09-28 | Stop reason: HOSPADM

## 2024-09-24 RX ORDER — SODIUM CHLORIDE 9 MG/ML
INJECTION, SOLUTION INTRAVENOUS PRN
Status: DISCONTINUED | OUTPATIENT
Start: 2024-09-24 | End: 2024-09-28 | Stop reason: HOSPADM

## 2024-09-24 RX ORDER — MAGNESIUM SULFATE IN WATER 40 MG/ML
2000 INJECTION, SOLUTION INTRAVENOUS PRN
Status: DISCONTINUED | OUTPATIENT
Start: 2024-09-24 | End: 2024-09-28 | Stop reason: HOSPADM

## 2024-09-24 RX ORDER — ONDANSETRON 2 MG/ML
4 INJECTION INTRAMUSCULAR; INTRAVENOUS EVERY 6 HOURS PRN
Status: DISCONTINUED | OUTPATIENT
Start: 2024-09-24 | End: 2024-09-28 | Stop reason: HOSPADM

## 2024-09-24 RX ORDER — DOXYCYCLINE 100 MG/1
100 CAPSULE ORAL EVERY 12 HOURS SCHEDULED
Status: DISCONTINUED | OUTPATIENT
Start: 2024-09-24 | End: 2024-09-28 | Stop reason: HOSPADM

## 2024-09-24 RX ORDER — CITALOPRAM HYDROBROMIDE 20 MG/1
20 TABLET ORAL DAILY
Status: DISCONTINUED | OUTPATIENT
Start: 2024-09-24 | End: 2024-09-28 | Stop reason: HOSPADM

## 2024-09-24 RX ORDER — SODIUM CHLORIDE 0.9 % (FLUSH) 0.9 %
5-40 SYRINGE (ML) INJECTION PRN
Status: DISCONTINUED | OUTPATIENT
Start: 2024-09-24 | End: 2024-09-28 | Stop reason: HOSPADM

## 2024-09-24 RX ORDER — GABAPENTIN 300 MG/1
300 CAPSULE ORAL NIGHTLY
Status: DISCONTINUED | OUTPATIENT
Start: 2024-09-25 | End: 2024-09-24

## 2024-09-24 RX ORDER — GABAPENTIN 300 MG/1
300 CAPSULE ORAL NIGHTLY
Status: DISCONTINUED | OUTPATIENT
Start: 2024-09-24 | End: 2024-09-28 | Stop reason: HOSPADM

## 2024-09-24 RX ORDER — FUROSEMIDE 10 MG/ML
40 INJECTION INTRAMUSCULAR; INTRAVENOUS ONCE
Status: COMPLETED | OUTPATIENT
Start: 2024-09-24 | End: 2024-09-24

## 2024-09-24 RX ORDER — POTASSIUM CHLORIDE 29.8 MG/ML
20 INJECTION INTRAVENOUS PRN
Status: DISCONTINUED | OUTPATIENT
Start: 2024-09-24 | End: 2024-09-24

## 2024-09-24 RX ORDER — MAGNESIUM HYDROXIDE/ALUMINUM HYDROXICE/SIMETHICONE 120; 1200; 1200 MG/30ML; MG/30ML; MG/30ML
30 SUSPENSION ORAL EVERY 6 HOURS PRN
Status: DISCONTINUED | OUTPATIENT
Start: 2024-09-24 | End: 2024-09-28 | Stop reason: HOSPADM

## 2024-09-24 RX ORDER — POTASSIUM CHLORIDE 7.45 MG/ML
10 INJECTION INTRAVENOUS PRN
Status: DISCONTINUED | OUTPATIENT
Start: 2024-09-24 | End: 2024-09-28 | Stop reason: HOSPADM

## 2024-09-24 RX ORDER — ACETAMINOPHEN 325 MG/1
650 TABLET ORAL EVERY 6 HOURS PRN
Status: DISCONTINUED | OUTPATIENT
Start: 2024-09-24 | End: 2024-09-28 | Stop reason: HOSPADM

## 2024-09-24 RX ORDER — GABAPENTIN 300 MG/1
300 CAPSULE ORAL DAILY
Status: DISCONTINUED | OUTPATIENT
Start: 2024-09-24 | End: 2024-09-24

## 2024-09-24 RX ORDER — ONDANSETRON 4 MG/1
4 TABLET, ORALLY DISINTEGRATING ORAL EVERY 8 HOURS PRN
Status: DISCONTINUED | OUTPATIENT
Start: 2024-09-24 | End: 2024-09-28 | Stop reason: HOSPADM

## 2024-09-24 RX ADMIN — DOXYCYCLINE HYCLATE 100 MG: 100 CAPSULE ORAL at 20:39

## 2024-09-24 RX ADMIN — ALUMINUM HYDROXIDE, MAGNESIUM HYDROXIDE, AND SIMETHICONE 30 ML: 200; 200; 20 SUSPENSION ORAL at 18:03

## 2024-09-24 RX ADMIN — SODIUM CHLORIDE, PRESERVATIVE FREE 0.45 ML: 5 INJECTION INTRAVENOUS at 10:35

## 2024-09-24 RX ADMIN — IPRATROPIUM BROMIDE 0.5 MG: 0.5 SOLUTION RESPIRATORY (INHALATION) at 04:36

## 2024-09-24 RX ADMIN — DOXYCYCLINE HYCLATE 100 MG: 100 CAPSULE ORAL at 10:53

## 2024-09-24 RX ADMIN — ACETAMINOPHEN 650 MG: 325 TABLET ORAL at 20:38

## 2024-09-24 RX ADMIN — ALBUTEROL SULFATE 2.5 MG: 2.5 SOLUTION RESPIRATORY (INHALATION) at 11:17

## 2024-09-24 RX ADMIN — ALBUTEROL SULFATE 2.5 MG: 2.5 SOLUTION RESPIRATORY (INHALATION) at 19:33

## 2024-09-24 RX ADMIN — ENOXAPARIN SODIUM 40 MG: 100 INJECTION SUBCUTANEOUS at 10:53

## 2024-09-24 RX ADMIN — SODIUM CHLORIDE, PRESERVATIVE FREE 10 ML: 5 INJECTION INTRAVENOUS at 20:39

## 2024-09-24 RX ADMIN — FUROSEMIDE 40 MG: 10 INJECTION, SOLUTION INTRAMUSCULAR; INTRAVENOUS at 06:43

## 2024-09-24 RX ADMIN — MAGNESIUM SULFATE HEPTAHYDRATE 2000 MG: 40 INJECTION, SOLUTION INTRAVENOUS at 03:55

## 2024-09-24 RX ADMIN — ALBUTEROL SULFATE 10 MG: 2.5 SOLUTION RESPIRATORY (INHALATION) at 04:36

## 2024-09-24 RX ADMIN — GABAPENTIN 300 MG: 300 CAPSULE ORAL at 20:43

## 2024-09-24 RX ADMIN — CITALOPRAM HYDROBROMIDE 20 MG: 20 TABLET ORAL at 14:28

## 2024-09-24 RX ADMIN — WATER 40 MG: 1 INJECTION INTRAMUSCULAR; INTRAVENOUS; SUBCUTANEOUS at 10:54

## 2024-09-24 RX ADMIN — SODIUM CHLORIDE, PRESERVATIVE FREE 10 ML: 5 INJECTION INTRAVENOUS at 09:00

## 2024-09-24 RX ADMIN — ALBUTEROL SULFATE 2.5 MG: 2.5 SOLUTION RESPIRATORY (INHALATION) at 08:15

## 2024-09-24 RX ADMIN — MONTELUKAST 10 MG: 10 TABLET, FILM COATED ORAL at 20:39

## 2024-09-24 RX ADMIN — IOPAMIDOL 75 ML: 755 INJECTION, SOLUTION INTRAVENOUS at 05:49

## 2024-09-24 ASSESSMENT — PAIN SCALES - GENERAL
PAINLEVEL_OUTOF10: 0
PAINLEVEL_OUTOF10: 4
PAINLEVEL_OUTOF10: 0
PAINLEVEL_OUTOF10: 0

## 2024-09-24 ASSESSMENT — PAIN DESCRIPTION - DESCRIPTORS: DESCRIPTORS: PRESSURE

## 2024-09-24 ASSESSMENT — PAIN DESCRIPTION - ORIENTATION: ORIENTATION: ANTERIOR

## 2024-09-24 ASSESSMENT — PAIN DESCRIPTION - LOCATION: LOCATION: HEAD

## 2024-09-24 ASSESSMENT — ENCOUNTER SYMPTOMS
VOMITING: 0
COUGH: 1
WHEEZING: 1
SHORTNESS OF BREATH: 1
BACK PAIN: 0
DIARRHEA: 0
ABDOMINAL PAIN: 0
FACIAL SWELLING: 0

## 2024-09-24 NOTE — ACP (ADVANCE CARE PLANNING)
Advance Care Planning     Advance Care Planning Activator (Inpatient)  Conversation Note      Date of ACP Conversation: 9/24/2024     Conversation Conducted with: Patient with Decision Making Capacity    ACP Activator: EASTON TYLER RN    Health Care Decision Maker:     Current Designated Health Care Decision Maker:     Primary Decision Maker: ty Nieto - Spouse - [phone]  Click here to complete Healthcare Decision Makers including section of the Healthcare Decision Maker Relationship (ie \"Primary\")      Care Preferences: Full code status.

## 2024-09-24 NOTE — PROGRESS NOTES
4 Eyes Skin Assessment     NAME:  Jeanne Nieto  YOB: 1940  MEDICAL RECORD NUMBER:  [id number]    The patient is being assessed for  Admission    I agree that at least one RN has performed a thorough Head to Toe Skin Assessment on the patient. ALL assessment sites listed below have been assessed.      Areas assessed by both nurses:    Head, Face, Ears, Shoulders, Back, Chest, Arms, Elbows, Hands, Sacrum. Buttock, Coccyx, Ischium, and Legs. Feet and Heels        Does the Patient have a Wound? No noted wound(s)       Alvin Prevention initiated by RN: Yes  Wound Care Orders initiated by RN: No    Pressure Injury (Stage 3,4, Unstageable, DTI, NWPT, and Complex wounds) if present, place Wound referral order by RN under : No    New Ostomies, if present place, Ostomy referral order under : No     Nurse 1 eSignature: Electronically signed by Natalie Cornelius RN on 9/24/24 at 6:30 AM EDT    **SHARE this note so that the co-signing nurse can place an eSignature**    Nurse 2 eSignature: Electronically signed by REHAN BAHENA RN on 9/24/24 at 6:40 AM EDT

## 2024-09-24 NOTE — CONSULTS
[facility] Hospitalist Consult   Admit Date:  2024  3:37 AM   Name:  Jeanne Nieto   Age:  84 y.o.  Sex:  female  :  1940   MRN:  [id number]   Room:  [address] [place] [address]    Presenting/Chief Complaint: Shortness of Breath    Reason(s) for Admission: Severe persistent asthma with exacerbation [J45.51]  Acute on chronic respiratory failure with hypercapnia (HCC) [J96.22]  Acute respiratory failure with hypoxia and hypercapnia (HCC) [J96.01, J96.02]     Hospitalists consulted by No att. providers found for: assume care    History of Presenting Illness:   Jeanne Nieto is a 84 y.o. female with history of persistent severe asthma, mitral valve stenosis, aortic valve stenosis, GERD, hypertension, paroxysmal A-fib, on Coumadin and hypothyroidism admitted to ICU on  for acute hypoxic/hypercapnic respiratory failure, requiring BiPAP support.  Systolic blood pressure also elevated in 200s and was treated with Nitropaste.  CT PE negative for acute pulmonary embolism.  Patient started on IV steroids and nebs treatment. Also given IV Lasix for concern for fluid overload.  Cardiology consulted. BiPAP weaned off to nasal cannula.    Hospitalist consulted to assume care as primary.    Patient seen and examined at the bedside.  On 4 L nasal cannula and reports she is breathing much better today.  Complaining of exertional dyspnea.  She is not at home on oxygen.  Denies chest pain, palpitation, shortness of breath or nausea.  Reports has noted some worsening lower extremity edema for the past few days.    Assessment & Plan:     Acute on chronic hypoxic/hypercapnic respiratory failure:  Persistent severe asthma:  Off of BiPAP, currently on 4 L nasal cannula  Continue nebs treatment as needed  Continue doxycycline  Continue Solu-Medrol 40 mg IV daily  Continue Singulair  Continue supportive care    Acute diastolic heart failure:  Moderate aortic stenosis:  Moderate mitral stenosis:  Hypertension/hyperlipidemia:  Paroxysmal  healed fracture of the manubrium of the sternum. Old compression fractures of L1, L2.  Bilateral total shoulder arthroplasties.  Components appear well seated.  No dislocation. Soft tissues:  Unremarkable. Lymph nodes:  Unremarkable.  No enlarged lymph nodes. Gallbladder and bile ducts:  Prior cholecystectomy. Pancreas:  Calcified 3.5 cm lesion in the tail of the pancreas is likely related to old partially calcified pancreatic pseudocyst, no change.  Pancreatic neoplasm is not excluded but felt to be much less likely.     1.  No acute pulmonary embolism. 2.  No aneurysm of the thoracic aorta. 3.  No consolidation of pneumonia. 4.  The main pulmonary artery is distended. A dilated pulmonary artery may be due to pulmonary arterial hypertension, chronic pulmonary embolus or other pulmonary diseases. 5.  Large hiatal hernia. 6.  Calcified 3.5 cm lesion in the tail of the pancreas is likely related to old partially calcified pancreatic pseudocyst, no change. Pancreatic neoplasm is not excluded but felt to be much less likely. Automatic exposure control was used as a dose lowering technique. Radiation Dose: CTDI is 37.98 mGy. DLP is 749.61 mGy-cm. Contrast Type: iopamidol (ISOVUE-370) 76 . Contrast Volume:  75 mL Report signed on 09/24/2024 (07:08 Eastern Time) Signed by: Kim Alvarado M.D. Reading Location: [facility] CHEST PORTABLE    Result Date: 9/24/2024  Clinical History/Indication for Exam: Shortness of Breath : Shortness of Breath RADIOGRAPH OF THE CHEST 1 VIEW INDICATION:  Shortness of Breath : Shortness of Breath COMPARISON:  Chest x-ray 11/18/2023. FINDINGS: Lungs:  Perihilar and bilateral lower lobe atelectasis. Pleural space:  Unremarkable.  No pneumothorax. Heart:  The cardiac silhouette is prominent. Mediastinum:  Unremarkable.  Normal mediastinal contour. Bones/joints:  Status post bilateral shoulder arthroplasty.  No acute fracture.     Perihilar and bilateral lower lobe atelectasis. Report signed on

## 2024-09-24 NOTE — PROGRESS NOTES
Maalox 30 ml po given for indigestion. Respirations even and unlabored. No s/sx distress. No further needs at present.

## 2024-09-24 NOTE — ED NOTES
TRANSFER - OUT REPORT:    Verbal report given to Natalie URIBE on Jeanne Nieto  being transferred to [facility] for routine progression of patient care       Report consisted of patient's Situation, Background, Assessment and   Recommendations(SBAR).     Information from the following report(s) Nurse Handoff Report was reviewed with the receiving nurse.    [place] Fall Assessment:                           Lines:   Peripheral IV 09/24/24 Right Forearm (Active)       Peripheral IV 09/24/24 Left Antecubital (Active)        Opportunity for questions and clarification was provided.      Patient transported with:  Monitor, O2 @ bipap lpm, and Registered Nurse       Malinda Zamudio RN  09/24/24 1576

## 2024-09-24 NOTE — PROGRESS NOTES
TRANSFER - IN REPORT:    Verbal report received from Malinda URIBE on Jeanne Nieto  being received from ED for change in patient condition (BiPAP)      Report consisted of patient's Situation, Background, Assessment and   Recommendations(SBAR).     Information from the following report(s) Nurse Handoff Report, Intake/Output, MAR, Recent Results, Med Rec Status, and Alarm Parameters was reviewed with the receiving nurse.    Opportunity for questions and clarification was provided.      Assessment completed upon patient's arrival to unit and care assumed.

## 2024-09-24 NOTE — H&P
[facility] Critical Care Note:: 9/24/2024  Jeanne Nieto  Admission Date: 9/24/2024     Length of Stay: 0 days    Background: 84 y.o. female with asthma, PAF, MV stenosis, aortic valve stenosis, GERD who complains of 1 week increasing shortness of breath and exertional dyspnea.  Patient had a cough with occasional yellow sputum.  No fevers or chills.  Patient is not up-to-date on immunizations (COVID or flu).  Patient has noted her ankles have been swollen over the last week as well.  Patient describes exertional dyspnea that improves with rest and this is not like her asthma.  Patient was wheezing on scene with EMS and received albuterol, Solu-Medrol, nitro (no chest pain) and was placed on CPAP.  BiPAP was continued in the ER.  Critical care was asked see the patient for admission to the ICU for continued BiPAP and close observation.    Patient has a history of paroxysmal atrial fibrillation and recent office visits have documented noncompliance with Coumadin.    [place] 11/23: Hyperdynamic left ventricular function LVEF 73%    Notable PMH:  has a past medical history of Aortic stenosis, Asthma, Chronic erosive gastritis, Colon polyps, Depression, GERD (gastroesophageal reflux disease), Hypertension, Hypothyroidism, Iron deficiency anemia, Osteoarthritis, Osteoporosis, RLS (restless legs syndrome), Severe obesity (HCC), and Severe persistent asthma with acute exacerbation.    24 Hour events: Worsening of her shortness of breath and got even more problematic through the night patient awoke and called EMS.  Placed on BiPAP in the ER shows improvement but will admit to ICU for further observation.    Review of Systems: Comprehensive ROS negative except in HPI    Lines: (insertion date)      Drips: current dose (range)        Pertinent Exam:         Blood pressure 123/64, pulse 70, temperature 98.3 °F (36.8 °C), temperature source Axillary, resp. rate 26, SpO2 98%. No intake or output data in the 24 hours

## 2024-09-24 NOTE — PROGRESS NOTES
4 Eyes Skin Assessment     NAME:  Jeanne Nieto  YOB: 1940  MEDICAL RECORD NUMBER:  [id number]    The patient is being assessed for  Admission    I agree that at least one RN has performed a thorough Head to Toe Skin Assessment on the patient. ALL assessment sites listed below have been assessed.      Areas assessed by both nurses:    Head, Face, Ears, Shoulders, Back, Chest, Arms, Elbows, Hands, Sacrum. Buttock, Coccyx, Ischium, and Legs. Feet and Heels        Does the Patient have a Wound? No noted wound(s)       Alvin Prevention initiated by RN: Yes  Wound Care Orders initiated by RN: No    Pressure Injury (Stage 3,4, Unstageable, DTI, NWPT, and Complex wounds) if present, place Wound referral order by RN under : No    New Ostomies, if present place, Ostomy referral order under : No     Nurse 1 eSignature: Electronically signed by Maricarmen Schilling RN on 9/24/24 at 3:39 PM EDT    **SHARE this note so that the co-signing nurse can place an eSignature**    Nurse 2 eSignature: Electronically signed by Becky Benavides RN on 9/24/24 at 4:30 PM EDT

## 2024-09-24 NOTE — ED TRIAGE NOTES
Patient arrives via EMS from home c/o SOB, hx of asthma. 60% RA recovered to 90s using CPAP with EMS. Was given 5mg albuterol, 125mg solumedrol; 1 in nitro paste and began to give magnesium, roughly 1-2mg prior to arrival.     240/100  100 HR

## 2024-09-24 NOTE — CONSULTS
In this split/shared evaluation I performed reviewed the patients's H&P, available images, labs, cultures., discussed case in detail with ACP, performed a medically appropriate history and exam, counseled and educated the patient and/or family member, ordered and/or reviewed medications, tests or procedures, documented information in EMR, independently interpreted images and coordinated care.     Personal Time: 45 minutes -this equates to greater than 50% of total time in patient consultation/care.        Patient seen and examined by me.  Agree with above note by physician extender.  Key findings are:      Vitals:    09/24/24 1000 09/24/24 1039 09/24/24 1100 09/24/24 1117   BP: 131/63  133/63    Pulse: 81  79 78   Resp: (!) 36  (!) 32 20   Temp:       TempSrc:       SpO2: 93%  96% 95%   Weight:  96.6 kg (213 lb)     Height:  1.524 m (5')          General: Patient is alert and oriented, no apparent distress  HEENT: Pupils equal reactive, oropharynx clear  Chest: Mildly diminished bilaterally.  Cardiovascular: S1-S2 regular with no murmur  Abdomen: Soft positive bowel sounds  Extremities: Soft no edema with intact distal pulses    Principal Problem:    Acute on chronic respiratory failure with hypercapnia (HCC)  Plan: Suspect this is multifactorial in nature.  Patient with underlying significant COPD as well as known aortic stenosis.  She presented with profound hypertension mainly driven by possible underlying COPD exacerbation leading to acute diastolic heart failure.  Patient has responded to IV Solu-Medrol as well as IV furosemide.  She is currently on room air.    Aortic stenosis: This has been historically moderate in nature.  Exam suggest at least moderate today.  Echocardiogram is pending.  May need additional workup for aortic stenosis if severe by echocardiogram.    Paroxysmal atrial fibrillation: Patient currently in sinus rhythm.  Off of warfarin.  INR is therapeutic.  If patient requires any additional

## 2024-09-24 NOTE — CARE COORDINATION
Patient admitted early this morning with exertional dyspnea, lower extremity edema, wheezing, and hypoxic on room air. Was placed on BIPAP in ER for hypercapnia seen on ABG. Patient rested for a few hours on BIPAP. Now on 4 lpm with sat 95%. Has been ambulating to bathroom independently. Will place transfer orders to the floor and consult hospitalist to assume care as primary. We will continue to follow along for suspected asthma exacerbation. Continue nebs, abx, and steroids. Wean oxygen as tolerates. Patient has chronic intermittent a-fib. Will place order for remote telemetry.     GALA Butler - NP

## 2024-09-24 NOTE — INTERDISCIPLINARY ROUNDS
Multi-D Rounds/Checklist (leapfrog):  Lines: can any be removed?: None    External Urinary Catheter (Active)      DVT Prophylaxis: Ordered  Vent: N/A  Nutrition Ordered/appropriate: Contraindicated- on BiPAP  Can antibiotics or other drugs be stopped? Adjusted and added doxycycline  Inpat Anti-Infectives (From admission, onward)      None          Consults needed:hospitalist to assume care  A: Is pain control adequate? (has PRNs? Stop drip?) Yes  B: Sedation break and SBT? N/A  C: Is sedation choice appropriate? N/A  D: Delirium/CAM-ICU? No  E: Mobility goals/appropriateness? Yes  F: Family update and plan?  is surrogate decision maker and is being updated daily by primary attending and nursing staff.    FELIPE Doll

## 2024-09-24 NOTE — CARE COORDINATION
Patient admitted in ICU for acute on chronic respiratory failure with hypercapnia. Currently, 4lpm high flow. ECHO results pending.  CM met with patient and her spouse for assessment. Patient is alert and oriented X 4. Verified she has a PCP but currently looking for a different one. Verified insurance and demographic. Lives with her spouse of 62 yrs in an independent living facility. Moved from [place] 2 yrs ago with goal to live in the [facility]. On a waiting list.Patient is indpendent in living and drives the couple to appointments. DME: [employer]. No supportive care current.   No PT/OT evaluations ordered.  CM offered [facility] referral but patient declined and wishes to get recommendations from friends.  CM will follow and remain available for consult.     09/24/24 1200   Service Assessment   Patient Orientation Alert and Oriented   Cognition Alert   History Provided By Patient   Primary Caregiver Self   Accompanied By/Relationship Spouse   Support Systems Spouse/Significant Other;Children;Family Members   Patient's Healthcare Decision Maker is: Legal Next of Kin   PCP Verified by CM Yes   Last Visit to PCP Within last 6 months   Prior Functional Level Independent in ADLs/IADLs   Current Functional Level Independent in ADLs/IADLs   Can patient return to prior living arrangement Yes   Ability to make needs known: Good   Family able to assist with home care needs: Yes   Would you like for me to discuss the discharge plan with any other family members/significant others, and if so, who? Yes  (Spouse)   Financial Resources Medicare  (Cign[employer] supplement)   Community Resources None   CM/SW Referral Other (see comment)  (N/A)   Social/Functional History   Lives With Spouse   Type of Home Senior housing apartment   Home Layout One level   Home Access Level entry   Home Equipment   (Nebulizer)   Receives Help From Family   ADL Assistance Independent   Homemaking Assistance Independent   Ambulation Assistance  Independent   Transfer Assistance Independent   Active  Yes   Mode of Transportation Car   Occupation Retired   Discharge Planning   Type of Residence Independent Living   Living Arrangements Spouse/Significant Other   Current Services Prior To Admission None   Potential Assistance Needed Home Care   DME Ordered? No   Potential Assistance Purchasing Medications No   Type of Home Care Services None   Patient expects to be discharged to: Independent living facility   Services At/After Discharge   Transition of Care Consult (CM Consult) Discharge Planning   [place] Resource Information Provided? No   Mode of Transport at Discharge Other (see comment)  (Car)   Confirm Follow Up Transport Family   Condition of Participation: Discharge Planning   The Plan for Transition of Care is related to the following treatment goals: To be determined by hospital course

## 2024-09-24 NOTE — PROGRESS NOTES
Patient on V60 and connected to nursing monitoring including Continuous Pulse Oximetry. V60 plugged into central monitoring system. Alarms are activated and nurse has been notified. Documentation completed.

## 2024-09-24 NOTE — ED PROVIDER NOTES
Emergency Department Provider Note       PCP: Di Dumont MD   Age: 84 y.o.   Sex: female     DISPOSITION Decision To Admit 09/24/2024 04:52:52 AM  Condition at Disposition: Serious       ICD-10-CM    1. Acute respiratory failure with hypoxia and hypercapnia (HCC)  J96.01     J96.02       2. Severe persistent asthma with exacerbation  J45.51           Medical Decision Making     Significant improvement on BiPAP.  Discussed with intensivist for admission.     1 or more acute illnesses that pose a threat to life or bodily function.   Discussion with external consultants.  Shared medical decision making was utilized in creating the patients health plan today.  I independently ordered and reviewed each unique test.    I reviewed external records: ED visit note from an outside group.  I reviewed external records: provider visit note from PCP.  I reviewed external records: provider visit note from outside specialist.  I reviewed external records: previous EKG including cardiologist interpretation.    I reviewed external records: previous lab results from outside ED.  I reviewed external records: previous imaging study including radiologist interpretation.   The patients assessment required an independent historian: EMS.  The reason they were needed is important historical information not provided by the patient.  ED cardiac monitoring rhythm strip was ordered and interpreted:  sinus rhythm with premature ventricular contractions  ST Segments:Nonspecific ST segments - NO STEMI   Rate: 90, limited by artifact  I interpreted the X-rays no acute cardiopulmonary process.    The patient was admitted and I have discussed patient management with the admitting provider.    Exclusion criteria - the patient is NOT to be included for SEP-1 Core Measure due to: May have criteria for sepsis, but does not meet criteria for severe sepsis or septic shock   Critical care procedure note : 60 minutes of critical care time was performed

## 2024-09-24 NOTE — PROGRESS NOTES
TRANSFER - IN REPORT:    Verbal report received on Jeanne Nieto  being received from ICU for routine progression of patient care      Report consisted of patient’s Situation, Background, Assessment and   Recommendations(SBAR).     Information from the following report(s) Nurse Handoff Report was reviewed with the receiving nurse.    Opportunity for questions and clarification was provided.

## 2024-09-25 PROBLEM — I27.20 PULMONARY HYPERTENSION (HCC): Status: ACTIVE | Noted: 2024-09-25

## 2024-09-25 PROBLEM — K44.9 HIATAL HERNIA: Chronic | Status: ACTIVE | Noted: 2024-09-25

## 2024-09-25 PROBLEM — G47.33 OSA (OBSTRUCTIVE SLEEP APNEA): Chronic | Status: ACTIVE | Noted: 2024-09-25

## 2024-09-25 PROBLEM — K44.9 HIATAL HERNIA: Status: ACTIVE | Noted: 2024-09-25

## 2024-09-25 PROBLEM — I35.0 AORTIC STENOSIS: Chronic | Status: ACTIVE | Noted: 2023-03-13

## 2024-09-25 PROBLEM — I27.20 PULMONARY HYPERTENSION (HCC): Chronic | Status: ACTIVE | Noted: 2024-09-25

## 2024-09-25 LAB
ANION GAP SERPL CALC-SCNC: 10 MMOL/L (ref 9–18)
BASOPHILS # BLD: 0 K/UL (ref 0–0.2)
BASOPHILS NFR BLD: 0 % (ref 0–2)
BUN SERPL-MCNC: 23 MG/DL (ref 8–23)
CALCIUM SERPL-MCNC: 9.3 MG/DL (ref 8.8–10.2)
CHLORIDE SERPL-SCNC: 101 MMOL/L (ref 98–107)
CO2 SERPL-SCNC: 30 MMOL/L (ref 20–28)
CREAT SERPL-MCNC: 0.82 MG/DL (ref 0.6–1.1)
DIFFERENTIAL METHOD BLD: ABNORMAL
EKG ATRIAL RATE: 68 BPM
EKG DIAGNOSIS: NORMAL
EKG DIAGNOSIS: NORMAL
EKG P AXIS: 71 DEGREES
EKG P-R INTERVAL: 164 MS
EKG Q-T INTERVAL: 332 MS
EKG Q-T INTERVAL: 400 MS
EKG QRS DURATION: 102 MS
EKG QRS DURATION: 96 MS
EKG QTC CALCULATION (BAZETT): 425 MS
EKG QTC CALCULATION (BAZETT): 501 MS
EKG R AXIS: -11 DEGREES
EKG R AXIS: 15 DEGREES
EKG T AXIS: 154 DEGREES
EKG T AXIS: 96 DEGREES
EKG VENTRICULAR RATE: 137 BPM
EKG VENTRICULAR RATE: 68 BPM
EOSINOPHIL # BLD: 0 K/UL (ref 0–0.8)
EOSINOPHIL NFR BLD: 0 % (ref 0.5–7.8)
ERYTHROCYTE [DISTWIDTH] IN BLOOD BY AUTOMATED COUNT: 14.6 % (ref 11.9–14.6)
GLUCOSE SERPL-MCNC: 134 MG/DL (ref 70–99)
HCT VFR BLD AUTO: 34.9 % (ref 35.8–46.3)
HGB BLD-MCNC: 10.8 G/DL (ref 11.7–15.4)
IMM GRANULOCYTES # BLD AUTO: 0.1 K/UL (ref 0–0.5)
IMM GRANULOCYTES NFR BLD AUTO: 1 % (ref 0–5)
LYMPHOCYTES # BLD: 0.6 K/UL (ref 0.5–4.6)
LYMPHOCYTES NFR BLD: 6 % (ref 13–44)
MCH RBC QN AUTO: 28.4 PG (ref 26.1–32.9)
MCHC RBC AUTO-ENTMCNC: 30.9 G/DL (ref 31.4–35)
MCV RBC AUTO: 91.8 FL (ref 82–102)
MONOCYTES # BLD: 0.8 K/UL (ref 0.1–1.3)
MONOCYTES NFR BLD: 8 % (ref 4–12)
NEUTS SEG # BLD: 8.2 K/UL (ref 1.7–8.2)
NEUTS SEG NFR BLD: 85 % (ref 43–78)
NRBC # BLD: 0 K/UL (ref 0–0.2)
NT PRO BNP: 2166 PG/ML (ref 0–450)
PLATELET # BLD AUTO: 242 K/UL (ref 150–450)
PMV BLD AUTO: 9.9 FL (ref 9.4–12.3)
POTASSIUM SERPL-SCNC: 4.2 MMOL/L (ref 3.5–5.1)
RBC # BLD AUTO: 3.8 M/UL (ref 4.05–5.2)
SODIUM SERPL-SCNC: 140 MMOL/L (ref 136–145)
TROPONIN T SERPL HS-MCNC: 106 NG/L (ref 0–14)
TROPONIN T SERPL HS-MCNC: 121 NG/L (ref 0–14)
TROPONIN T SERPL HS-MCNC: 93 NG/L (ref 0–14)
WBC # BLD AUTO: 9.7 K/UL (ref 4.3–11.1)

## 2024-09-25 PROCEDURE — 94660 CPAP INITIATION&MGMT: CPT

## 2024-09-25 PROCEDURE — 6360000002 HC RX W HCPCS

## 2024-09-25 PROCEDURE — 84484 ASSAY OF TROPONIN QUANT: CPT

## 2024-09-25 PROCEDURE — 6370000000 HC RX 637 (ALT 250 FOR IP): Performed by: NURSE PRACTITIONER

## 2024-09-25 PROCEDURE — 93005 ELECTROCARDIOGRAM TRACING: CPT | Performed by: HOSPITALIST

## 2024-09-25 PROCEDURE — 83880 ASSAY OF NATRIURETIC PEPTIDE: CPT

## 2024-09-25 PROCEDURE — 6370000000 HC RX 637 (ALT 250 FOR IP): Performed by: FAMILY MEDICINE

## 2024-09-25 PROCEDURE — 1100000003 HC PRIVATE W/ TELEMETRY

## 2024-09-25 PROCEDURE — 85025 COMPLETE CBC W/AUTO DIFF WBC: CPT

## 2024-09-25 PROCEDURE — 99232 SBSQ HOSP IP/OBS MODERATE 35: CPT | Performed by: INTERNAL MEDICINE

## 2024-09-25 PROCEDURE — 97530 THERAPEUTIC ACTIVITIES: CPT

## 2024-09-25 PROCEDURE — 36415 COLL VENOUS BLD VENIPUNCTURE: CPT

## 2024-09-25 PROCEDURE — 82785 ASSAY OF IGE: CPT

## 2024-09-25 PROCEDURE — 93005 ELECTROCARDIOGRAM TRACING: CPT | Performed by: NURSE PRACTITIONER

## 2024-09-25 PROCEDURE — 93010 ELECTROCARDIOGRAM REPORT: CPT | Performed by: INTERNAL MEDICINE

## 2024-09-25 PROCEDURE — 2580000003 HC RX 258: Performed by: NURSE PRACTITIONER

## 2024-09-25 PROCEDURE — 6370000000 HC RX 637 (ALT 250 FOR IP): Performed by: PHYSICIAN ASSISTANT

## 2024-09-25 PROCEDURE — 2580000003 HC RX 258

## 2024-09-25 PROCEDURE — 2700000000 HC OXYGEN THERAPY PER DAY

## 2024-09-25 PROCEDURE — 6360000002 HC RX W HCPCS: Performed by: NURSE PRACTITIONER

## 2024-09-25 PROCEDURE — 97165 OT EVAL LOW COMPLEX 30 MIN: CPT

## 2024-09-25 PROCEDURE — 80048 BASIC METABOLIC PNL TOTAL CA: CPT

## 2024-09-25 PROCEDURE — 94640 AIRWAY INHALATION TREATMENT: CPT

## 2024-09-25 PROCEDURE — 6360000002 HC RX W HCPCS: Performed by: HOSPITALIST

## 2024-09-25 PROCEDURE — 97116 GAIT TRAINING THERAPY: CPT

## 2024-09-25 PROCEDURE — 2580000003 HC RX 258: Performed by: HOSPITALIST

## 2024-09-25 PROCEDURE — 2500000003 HC RX 250 WO HCPCS: Performed by: HOSPITALIST

## 2024-09-25 PROCEDURE — 97161 PT EVAL LOW COMPLEX 20 MIN: CPT

## 2024-09-25 PROCEDURE — 2500000003 HC RX 250 WO HCPCS: Performed by: NURSE PRACTITIONER

## 2024-09-25 PROCEDURE — 94761 N-INVAS EAR/PLS OXIMETRY MLT: CPT

## 2024-09-25 PROCEDURE — 6370000000 HC RX 637 (ALT 250 FOR IP): Performed by: INTERNAL MEDICINE

## 2024-09-25 RX ORDER — PREDNISONE 20 MG/1
40 TABLET ORAL DAILY
Status: DISCONTINUED | OUTPATIENT
Start: 2024-09-25 | End: 2024-09-25

## 2024-09-25 RX ORDER — PANTOPRAZOLE SODIUM 40 MG/1
40 TABLET, DELAYED RELEASE ORAL
Status: DISCONTINUED | OUTPATIENT
Start: 2024-09-26 | End: 2024-09-28 | Stop reason: HOSPADM

## 2024-09-25 RX ORDER — 0.9 % SODIUM CHLORIDE 0.9 %
1000 INTRAVENOUS SOLUTION INTRAVENOUS ONCE
Status: DISCONTINUED | OUTPATIENT
Start: 2024-09-25 | End: 2024-09-25

## 2024-09-25 RX ORDER — METOPROLOL TARTRATE 1 MG/ML
5 INJECTION, SOLUTION INTRAVENOUS ONCE
Status: COMPLETED | OUTPATIENT
Start: 2024-09-25 | End: 2024-09-25

## 2024-09-25 RX ORDER — ADENOSINE 3 MG/ML
6 INJECTION, SOLUTION INTRAVENOUS ONCE
Status: COMPLETED | OUTPATIENT
Start: 2024-09-25 | End: 2024-09-25

## 2024-09-25 RX ORDER — DILTIAZEM HCL 60 MG
30 TABLET ORAL EVERY 6 HOURS SCHEDULED
Status: DISCONTINUED | OUTPATIENT
Start: 2024-09-25 | End: 2024-09-28 | Stop reason: HOSPADM

## 2024-09-25 RX ORDER — 0.9 % SODIUM CHLORIDE 0.9 %
500 INTRAVENOUS SOLUTION INTRAVENOUS ONCE
Status: COMPLETED | OUTPATIENT
Start: 2024-09-25 | End: 2024-09-25

## 2024-09-25 RX ORDER — DILTIAZEM HYDROCHLORIDE 5 MG/ML
10 INJECTION INTRAVENOUS ONCE
Status: DISCONTINUED | OUTPATIENT
Start: 2024-09-25 | End: 2024-09-28 | Stop reason: HOSPADM

## 2024-09-25 RX ORDER — FUROSEMIDE 40 MG
40 TABLET ORAL DAILY
Status: DISCONTINUED | OUTPATIENT
Start: 2024-09-25 | End: 2024-09-28 | Stop reason: HOSPADM

## 2024-09-25 RX ADMIN — SODIUM CHLORIDE 500 ML: 9 INJECTION, SOLUTION INTRAVENOUS at 03:07

## 2024-09-25 RX ADMIN — WATER 40 MG: 1 INJECTION INTRAMUSCULAR; INTRAVENOUS; SUBCUTANEOUS at 08:41

## 2024-09-25 RX ADMIN — ADENOSINE 6 MG: 3 INJECTION INTRAVENOUS at 03:11

## 2024-09-25 RX ADMIN — ALBUTEROL SULFATE 2.5 MG: 2.5 SOLUTION RESPIRATORY (INHALATION) at 15:08

## 2024-09-25 RX ADMIN — APIXABAN 5 MG: 5 TABLET, FILM COATED ORAL at 08:39

## 2024-09-25 RX ADMIN — LEVOTHYROXINE SODIUM 137 MCG: 0.11 TABLET ORAL at 06:26

## 2024-09-25 RX ADMIN — ALBUTEROL SULFATE 2.5 MG: 2.5 SOLUTION RESPIRATORY (INHALATION) at 08:11

## 2024-09-25 RX ADMIN — DILTIAZEM HYDROCHLORIDE 30 MG: 60 TABLET, FILM COATED ORAL at 17:15

## 2024-09-25 RX ADMIN — DILTIAZEM HYDROCHLORIDE 30 MG: 60 TABLET, FILM COATED ORAL at 08:40

## 2024-09-25 RX ADMIN — SODIUM CHLORIDE, PRESERVATIVE FREE 10 ML: 5 INJECTION INTRAVENOUS at 08:41

## 2024-09-25 RX ADMIN — METOPROLOL TARTRATE 5 MG: 5 INJECTION INTRAVENOUS at 02:58

## 2024-09-25 RX ADMIN — DOXYCYCLINE HYCLATE 100 MG: 100 CAPSULE ORAL at 20:45

## 2024-09-25 RX ADMIN — CITALOPRAM HYDROBROMIDE 20 MG: 20 TABLET ORAL at 08:40

## 2024-09-25 RX ADMIN — APIXABAN 5 MG: 5 TABLET, FILM COATED ORAL at 20:45

## 2024-09-25 RX ADMIN — GABAPENTIN 300 MG: 300 CAPSULE ORAL at 20:45

## 2024-09-25 RX ADMIN — ALBUTEROL SULFATE 2.5 MG: 2.5 SOLUTION RESPIRATORY (INHALATION) at 19:47

## 2024-09-25 RX ADMIN — SODIUM CHLORIDE, PRESERVATIVE FREE 10 ML: 5 INJECTION INTRAVENOUS at 20:45

## 2024-09-25 RX ADMIN — WATER 40 MG: 1 INJECTION INTRAMUSCULAR; INTRAVENOUS; SUBCUTANEOUS at 15:23

## 2024-09-25 RX ADMIN — MONTELUKAST 10 MG: 10 TABLET, FILM COATED ORAL at 20:45

## 2024-09-25 RX ADMIN — DOXYCYCLINE HYCLATE 100 MG: 100 CAPSULE ORAL at 08:39

## 2024-09-25 RX ADMIN — ALBUTEROL SULFATE 2.5 MG: 2.5 SOLUTION RESPIRATORY (INHALATION) at 11:28

## 2024-09-25 NOTE — PROGRESS NOTES
Hospitalist Progress Note   Admit Date:  2024  3:37 AM   Name:  Jeanne Nieto   Age:  84 y.o.  Sex:  female  :  1940   MRN:  [id number]   Room:  [facility]    Presenting/Chief Complaint: Shortness of Breath     Reason(s) for Admission: Severe persistent asthma with exacerbation [J45.51]  Acute on chronic respiratory failure with hypercapnia (HCC) [J96.22]  Acute respiratory failure with hypoxia and hypercapnia (HCC) [J96.01, J96.02]     Hospital Course:    84 y.o. female with history of persistent severe asthma, mitral valve stenosis, aortic valve stenosis, GERD, hypertension, paroxysmal A-fib, on Coumadin and hypothyroidism admitted to ICU on  for acute hypoxic/hypercapnic respiratory failure, requiring BiPAP support. CT PE negative for acute pulmonary embolism. Patient started on IV steroids and nebs treatment. Also given IV Lasix for concern for fluid overload. Cardiology consulted. BiPAP weaned off to nasal cannula.     Subjective & 24hr Events:   Patient was seen and examined at the bedside.  Early this morning patient had episode of SVT status post adenosine now currently resolved.  Patient feeling slightly better this morning.  Denies any cardiac chest pain, shortness of breath, abdominal pain, fever/chills.  Patient currently on 3 L nasal cannula satting at 97%.      Assessment & Plan:   Acute on chronic hypoxic/hypercapnic respiratory failure  Persistent severe asthma  - Patient currently on 3 L nasal cannula satting at 97%  - Continue DuoNebs  - Doxycycline  - Continue Solu-Medrol 40 mg IV daily  - Singulair    Acute diastolic heart failure  Moderate aortic stenosis  Moderate mitral stenosis  Hypertension/hyperlipidemia  Proximal atrial fibrillation  - Cardiology consulted, appreciate recommendations  - Patient currently in sinus rhythm off of warfarin.  - Cardiology started patient on Eliquis  - Cardiology started on low-dose diltiazem  - Echo with ejection fraction of 75%  - Continue  IntraVENous PRN    0.9 % sodium chloride infusion   IntraVENous PRN    potassium chloride 10 mEq/100 mL IVPB (Peripheral Line)  10 mEq IntraVENous PRN    magnesium sulfate 2000 mg in 50 mL IVPB premix  2,000 mg IntraVENous PRN    ondansetron (ZOFRAN-ODT) disintegrating tablet 4 mg  4 mg Oral Q8H PRN    Or    ondansetron (ZOFRAN) injection 4 mg  4 mg IntraVENous Q6H PRN    polyethylene glycol (GLYCOLAX) packet 17 g  17 g Oral Daily PRN    acetaminophen (TYLENOL) tablet 650 mg  650 mg Oral Q6H PRN    Or    acetaminophen (TYLENOL) suppository 650 mg  650 mg Rectal Q6H PRN    albuterol (PROVENTIL) (2.5 MG/3ML) 0.083% nebulizer solution 2.5 mg  2.5 mg Nebulization Q4H WA RT    doxycycline hyclate (VIBRAMYCIN) capsule 100 mg  100 mg Oral 2 times per day    citalopram (CELEXA) tablet 20 mg  20 mg Oral Daily    levothyroxine (SYNTHROID) tablet 137 mcg  137 mcg Oral Daily    montelukast (SINGULAIR) tablet 10 mg  10 mg Oral Nightly    aluminum & magnesium hydroxide-simethicone (MAALOX) 200-200-20 MG/5ML suspension 30 mL  30 mL Oral Q6H PRN    gabapentin (NEURONTIN) capsule 300 mg  300 mg Oral Nightly       Signed:  Ellis Abbasi MD    Part of this note may have been written by using a voice dictation software.  The note has been proof read but may still contain some grammatical/other typographical errors.

## 2024-09-25 NOTE — ICUWATCH
Rapid Response Team Note      Subjective: Responded to Rapid Response secondary to SVT.    Summary: Pt HR sustaining 170s per telemetry, pt anxious and SOB. 5mg IV Lopressor given with HR remaining elevated 140s. 6mg IV Adenosine  administered per MD. HR converted to NSR 70s.     See Rapid Response/Code Blue Narrator for full documentation    Outcome: Patient to remain in current location. Will follow-up per Rapid Response Team Clinical Rounding protocol.      Jazmyne Tomas RN  [facility]: [phone]  [facility]: [phone]

## 2024-09-25 NOTE — PROGRESS NOTES
Pt sitting up in chair. 1.5L NC in place. No s/sx of distress noted. No complaints of pain. Call light within reach. Door open.

## 2024-09-25 NOTE — PROGRESS NOTES
Pt sitting up in bed awake. 1.5L NC in place. No s/sx of distress noted. No complaints of pain. Call light within reach. Door open.

## 2024-09-25 NOTE — PROGRESS NOTES
09/24/24 1902   NIV Type   NIV Started/Stopped On   Equipment Type V60   Mode Bilevel   Mask Type Full face mask   Mask Size Medium   Assessment   Pulse 71   Respirations 18   SpO2 97 %   Comfort Level Good   Using Accessory Muscles No   Mask Compliance Good   Skin Assessment Clean, dry, & intact   Settings/Measurements   PIP Observed 16 cm H20   IPAP 16 cmH20   CPAP/EPAP 8 cmH2O   Vt (Measured) 1038 mL   Rate Ordered 20   Insp Rise Time (%) 3 %   FiO2  35 %   I Time/ I Time % 0.9 s   Minute Volume (L/min) 20.8 Liters   Mask Leak (lpm) 0 lpm   Patient's Home Machine No   Alarm Settings   Alarms On Y   Low Pressure (cmH2O) 5 cmH2O   High Pressure (cmH2O) 35 cmH2O   Apnea (secs) 20 secs   RR Low (bpm) 10   RR High (bpm) 50 br/min

## 2024-09-25 NOTE — PROGRESS NOTES
[facility] CARDIOLOGY PROGRESS NOTE           9/25/2024 6:59 AM    Admit Date: 9/24/2024      Subjective:   + sob  No chest pain  Not heidy BiPap    Objective:      Vitals:    09/25/24 0312 09/25/24 0324 09/25/24 0600 09/25/24 0626   BP: 98/63      Pulse: 70 66     Resp:  23     Temp:       TempSrc:       SpO2: 99% 99%  97%   Weight:   94.2 kg (207 lb 10.8 oz)    Height:           Physical Exam:  General-No Acute Distress  Neck- supple, no JVD  CV- soft hs's  Lung- loud diffuse insp and exp wheezing  Abd- soft, nontender, nondistended  Ext- no edema bilaterally.  Skin- warm and dry    Data Review:   Recent Labs     09/24/24  0347 09/25/24  0319    140   K 4.2 4.2   MG 2.0  --    BUN 24* 23   WBC 9.6 9.7   HGB 11.9 10.8*   HCT 39.4 34.9*    242   INR 0.9  --      RHYTHM: nsr but rapis SVT this AM req iv adenoosine    Assessment/Plan:     Asthmatic respiratory failure  after recent uri  Moderate aortic stenosis  Paf  Hx RAYMOND, iron loss  ///  As per IM and Pulmonary  Low dose diltiazem added  Eliquis is added, out pt OAC strategy to be discussed           OTTONIEL GUTIERREZ MD  9/25/2024 6:59 AM

## 2024-09-25 NOTE — PROGRESS NOTES
Rapid response note:    Patient heart rate went up to 170s, rapid response was called, patient looks very anxious, EKG shows rate in between 140- 160 looks like an SVT, 6 mg of adenosine given converted back to normal sinus rhythm.  Patient is critically ill.  Without intervention, there is a high probability of acute organ impairment or life-threatening deterioration in the patient's condition from: SVT  Critical care interventions: Adenosine given  Total critical care time spent: 35 minutes.    Time is indicative of direct patient attendance at bedside and on the patient's floor nearby.  Includes time spent at bedside performing history and exam, performing chart review, discussing findings and treatment plan with patient and/or family, discussing patient with nursing staff, consultants and colleagues, and ordering/reviewing pertinent laboratory and radiographic evaluations.  Time excludes procedures.  CPT:  15080: First 30-74 minutes  62860: Each block of 30 min. beyond 74

## 2024-09-25 NOTE — PROGRESS NOTES
Patient SVT -172, patient complains of chest pain & \"heart racing\".     0254: Rapid Response called. Dr Dejesus at bedside. Patient on [employer] monitor- HR sustaining 160s.    0255: ICU [place] RNJazmyne, at bedside    0258: 5mg metoprolol IV administered at this time    0306: 10mg cardizem IV push ordered by Dr. Dejesus. Held per Dr. Dejesus due to BP dropping to 98/70 from 124/56.    0311: 6mg adenosine IV administered by ICU RNJazmyne per Dr. Dejesus    0312: HR 70    0324: HR stable at this time. Rapid response terminated.

## 2024-09-25 NOTE — PROGRESS NOTES
Labs     09/24/24  0347 09/25/24  0319   GLUCOSE 153* 134*       ABG:   No results for input(s): \"PH\", \"PCO2\", \"PO2\", \"HCO3\" in the last 72 hours.    Invalid input(s): \"PHI\", \"PCO2I\", \"PO2I\", \"HCO3I\", \"FIO2I\"    Microbiology:   No results for input(s): \"CULTURE\" in the last 72 hours.    ECHO: 09/24/24    ECHO (TTE) COMPLETE (PRN CONTRAST/BUBBLE/STRAIN/3D) 09/24/2024 12:52 PM (Final)    Interpretation Summary    Left Ventricle: Hyperdynamic left ventricular systolic function. EF by visual approximation is 75%. Left ventricle size is normal. Normal wall thickness. Normal wall motion. Abnormal diastolic function.    Aortic Valve: Calcified cusps. No regurgitation. Moderate stenosis of the aortic valve. AV mean gradient is 26 mmHg. AV peak gradient is 54 mmHg. LVOT:AV VTI Index is 0.70. AV area by continuity VTI is 2.2 cm2. AV Stroke Volume index is 80.6 mL/m2.    Mitral Valve: Annular calcification. Calcified leaflets.  Leaflets are not will well-visualized.  There is elevated transvalvular gradient.  This would be best evaluated by transesophageal echocardiogram. Mild regurgitation. MV mean gradient is 13 mmHg.    Tricuspid Valve: Mild to moderate regurgitation. The estimated RVSP is 49 mmHg.    Left Atrium: Left atrium is mildly dilated.    Interatrial Septum: There is a calcified mass attached to the interatrial septum.  This remains unchanged from prior echocardiograms.    Right Atrium: Right atrium is mildly dilated.    Image quality is adequate. Contrast used: Definity. Technically difficult study due to patient's heart rhythm.    Signed by: Brendan Frey MD on 9/24/2024 12:52 PM    ECHO:   SARA (PRN CONTRAST/BUBBLE/3D) 11/20/2023     Interpretation Summary    Case performed with sedation administered by Anesthesiology.    Suboptimal images due to large hiatal hernia.    Left Ventricle: Not well visualized. Unable to assess wall motion.    Aortic Valve: Trileaflet valve. Sclerosis of the aortic valve  and/or reviewed medications, tests or procedures, documented information in EMR, independently interpreted images, and coordinated care. which accounted for 20 minutes clinical time.     Impression:   83 yo with underlying asthma,  large hiatal hernia , untreated RY  morbid obesity ,pulmonary hypertension, aortic stenosis presented with SOB  She is followed in our office ,last asthma exacerbation in 10/2023  Much better, still wheezing  Continue steroids, nebeileen Navarrete MD

## 2024-09-25 NOTE — PLAN OF CARE
Problem: Respiratory - Adult  Goal: Achieves optimal ventilation and oxygenation  Outcome: Progressing  Flowsheets (Taken 9/25/2024 0814)  Achieves optimal ventilation and oxygenation:   Assess for changes in respiratory status   Assess for changes in mentation and behavior   Position to facilitate oxygenation and minimize respiratory effort   Respiratory therapy support as indicated   Assess and instruct to report shortness of breath or any respiratory difficulty   Encourage broncho-pulmonary hygiene including cough, deep breathe, incentive spirometry

## 2024-09-25 NOTE — PROGRESS NOTES
Pt resting in bed awake. Alert and oriented times 3 at this time. 4L NC in place. No s/sx of distress noted. No complaints of pain. Call light within reach. Door open.

## 2024-09-25 NOTE — ICUWATCH
RRT Clinical Rounding Nurse Progress Report      SUBJECTIVE: Patient assessed secondary to transfer from critical care.      Vitals:    09/25/24 0312 09/25/24 0324 09/25/24 0600 09/25/24 0626   BP: 98/63      Pulse: 70 66     Resp:  23     Temp:       TempSrc:       SpO2: 99% 99%  97%   Weight:   94.2 kg (207 lb 10.8 oz)    Height:            DETERIORATION INDEX SCORE: 31    ASSESSMENT:  Pt in bed watching television. A&Ox4, no needs expressed at this time. Respirations even and and unlabored, plans for BIPAP HS. Recent labs/notes/vitals reviewed and pt discussed with primary RN.     PLAN:  Will follow per RRT Clinical Rounding Program protocol.    Jazmyne Tomas RN  [facility]: [phone]  [facility]: [phone]

## 2024-09-25 NOTE — THERAPY EVALUATION
Retired    OBJECTIVE:     LINES / DRAINS / AIRWAY: IV    RESTRICTIONS/PRECAUTIONS:       PAIN: VITALS / O2:   Pre Treatment: unrated         Post Treatment: same       Vitals          Oxygen   1.5L upon entry 93%  1L during mobility with desat to 89/90, able to recovoer to 93% with pursed lip breathing and 4 rest breaks         GROSS EVALUATION: INTACT IMPAIRED   (See Comments)   UE AROM [x] []   UE PROM [x] []   Strength []  Generalized weakness     Posture / Balance []  Good sitting balance, fair + standing/during mobility. Noticeable trunk sway during gait   Sensation [x]     Coordination [x]       Tone [x]       Edema [x]    Activity Tolerance []  Limited by respiratory status, SOB, activity tolerance     Hand Dominance R [] L []      COGNITION/  PERCEPTION: INTACT IMPAIRED   (See Comments)   Orientation [x]     Vision [x]     Hearing [x]     Cognition  [x]     Perception []       MOBILITY: I Mod I S SBA CGA Min Mod Max Total  NT x2 Comments:   Bed Mobility    Rolling [] [] [] [x] [] [] [] [] [] [] []    Supine to Sit [] [] [] [x] [] [] [] [] [] [] []    Scooting [] [] [] [x] [] [] [] [] [] [] []    Sit to Supine [] [] [] [] [] [] [] [] [] [] []    Transfers    Sit to Stand [] [] [] [x] [x] [] [] [] [] [] []    Bed to Chair [] [] [] [] [x] [] [] [] [] [] []    Stand to Sit [] [] [] [x] [x] [] [] [] [] [] []    Tub/Shower [] [] [] [] [] [] [] [] [] [] []     Toilet [] [] [] [] [] [] [] [] [] [] []      [] [] [] [] [] [] [] [] [] [] []    I=Independent, Mod I=Modified Independent, S=Supervision/Setup, SBA=Standby Assistance, CGA=Contact Guard Assistance, Min=Minimal Assistance, Mod=Moderate Assistance, Max=Maximal Assistance, Total=Total Assistance, NT=Not Tested    ACTIVITIES OF DAILY LIVING: I Mod I S SBA CGA Min Mod Max Total NT Comments   BASIC ADLs:              Upper Body Bathing  [] [] [] [] [] [] [] [] [] [x]     Lower Body Bathing [] [] [] [] [] [] [] [] [] [x]     Toileting [] [] [] [] [] [] [] [] [] [x]  activity tolerance, decrease assistance required, prepare for functional activity, and prepare for functional transfers.     TREATMENT GRID:  N/A    AFTER TREATMENT PRECAUTIONS: Bed/Chair Locked, Call light within reach, Chair, Needs within reach, and RN notified    INTERDISCIPLINARY COLLABORATION:  RN/ PCT, PT/ PTA, and OT/ MILLER    EDUCATION:  Education Given To: Patient  Education Provided: Role of Therapy;Plan of Care;Energy Conservation;Transfer Training;Equipment;Fall Prevention Strategies  Education Method: Demonstration;Verbal  Barriers to Learning: Other (Comment) (pt resistance to walker or shower chair use)  Education Outcome: Verbalized understanding;Continued education needed    TOTAL TREATMENT DURATION AND TIME:  Time In: 1608  Time Out: 1626  Minutes: 18    Jacquelyn Kelly OT

## 2024-09-25 NOTE — PROGRESS NOTES
ACUTE PHYSICAL THERAPY GOALS:   (Developed with and agreed upon by patient and/or caregiver.)  Pt will ambulate 500 ft independently with use of breaks as needed in 7 therapy sessions.  Pt will tolerate 23 minutes of standing activity with LRAD/no device in 7 therapy sessions.  Pt will perform standing dynamic balance activities with minimal postural sway in 7 therapy sessions.  Pt will tolerate multiple sets and reps of BLE exercises in 7 therapy sessions.     PHYSICAL THERAPY Initial Assessment and PM  (Link to Caseload Tracking: PT Visit Days : 1  Acknowledge Orders  Time In/Out  PT Charge Capture  Rehab Caseload Tracker    Jeanne Nieto is a 84 y.o. female   PRIMARY DIAGNOSIS: Acute on chronic respiratory failure with hypercapnia (HCC)  Severe persistent asthma with exacerbation [J45.51]  Acute on chronic respiratory failure with hypercapnia (HCC) [J96.22]  Acute respiratory failure with hypoxia and hypercapnia (HCC) [J96.01, J96.02]       Reason for Referral: Other abnormalities of gait and mobility (R26.89)  Inpatient: Payor: MEDICARE / Plan: MEDICARE PART A AND B / Product Type: *No Product type* /     ASSESSMENT:     REHAB RECOMMENDATIONS:   Recommendation to date pending progress:  Setting:  Home Health Therapy    Equipment:    To Be Determined     ASSESSMENT:  Ms. Nieto is a 84 y.o. female presenting to PT following a hospitalization due to respiratory failure/asthma. At baseline, pt ambulates independently and lives with her spouse in an ILF with a level entry. At time of initial evaluation, pt presents below baseline with deficits in transfers, balance, gait and activity tolerance limiting overall functional mobility. Pt moves from supine to seated EOB with SBA, with good seated balance once upright. During ambulation of 500' with no AD, pt presents with decreased step length, impulsive speed control, and increased trunk sway. Pt required frequent rest breaks and benefited from cuing to decrease gait

## 2024-09-26 PROCEDURE — 6370000000 HC RX 637 (ALT 250 FOR IP)

## 2024-09-26 PROCEDURE — 94640 AIRWAY INHALATION TREATMENT: CPT

## 2024-09-26 PROCEDURE — 1100000003 HC PRIVATE W/ TELEMETRY

## 2024-09-26 PROCEDURE — 6360000002 HC RX W HCPCS: Performed by: INTERNAL MEDICINE

## 2024-09-26 PROCEDURE — 2580000003 HC RX 258: Performed by: NURSE PRACTITIONER

## 2024-09-26 PROCEDURE — 6370000000 HC RX 637 (ALT 250 FOR IP): Performed by: NURSE PRACTITIONER

## 2024-09-26 PROCEDURE — 99232 SBSQ HOSP IP/OBS MODERATE 35: CPT | Performed by: INTERNAL MEDICINE

## 2024-09-26 PROCEDURE — 6370000000 HC RX 637 (ALT 250 FOR IP): Performed by: PHYSICIAN ASSISTANT

## 2024-09-26 PROCEDURE — 6360000002 HC RX W HCPCS

## 2024-09-26 PROCEDURE — 6370000000 HC RX 637 (ALT 250 FOR IP): Performed by: INTERNAL MEDICINE

## 2024-09-26 PROCEDURE — 94761 N-INVAS EAR/PLS OXIMETRY MLT: CPT

## 2024-09-26 PROCEDURE — 2700000000 HC OXYGEN THERAPY PER DAY

## 2024-09-26 PROCEDURE — 6360000002 HC RX W HCPCS: Performed by: NURSE PRACTITIONER

## 2024-09-26 PROCEDURE — 6370000000 HC RX 637 (ALT 250 FOR IP): Performed by: FAMILY MEDICINE

## 2024-09-26 PROCEDURE — 2580000003 HC RX 258

## 2024-09-26 RX ORDER — BUDESONIDE 0.5 MG/2ML
0.5 INHALANT ORAL
Status: DISCONTINUED | OUTPATIENT
Start: 2024-09-26 | End: 2024-09-28 | Stop reason: HOSPADM

## 2024-09-26 RX ORDER — ATORVASTATIN CALCIUM 40 MG/1
40 TABLET, FILM COATED ORAL NIGHTLY
Status: DISCONTINUED | OUTPATIENT
Start: 2024-09-26 | End: 2024-09-28 | Stop reason: HOSPADM

## 2024-09-26 RX ADMIN — APIXABAN 5 MG: 5 TABLET, FILM COATED ORAL at 08:48

## 2024-09-26 RX ADMIN — ALBUTEROL SULFATE 2.5 MG: 2.5 SOLUTION RESPIRATORY (INHALATION) at 11:33

## 2024-09-26 RX ADMIN — MONTELUKAST 10 MG: 10 TABLET, FILM COATED ORAL at 21:10

## 2024-09-26 RX ADMIN — BUDESONIDE 500 MCG: 0.5 SUSPENSION RESPIRATORY (INHALATION) at 19:21

## 2024-09-26 RX ADMIN — WATER 40 MG: 1 INJECTION INTRAMUSCULAR; INTRAVENOUS; SUBCUTANEOUS at 15:22

## 2024-09-26 RX ADMIN — ALBUTEROL SULFATE 2.5 MG: 2.5 SOLUTION RESPIRATORY (INHALATION) at 07:16

## 2024-09-26 RX ADMIN — DILTIAZEM HYDROCHLORIDE 30 MG: 60 TABLET, FILM COATED ORAL at 17:31

## 2024-09-26 RX ADMIN — LEVOTHYROXINE SODIUM 137 MCG: 0.11 TABLET ORAL at 06:18

## 2024-09-26 RX ADMIN — WATER 40 MG: 1 INJECTION INTRAMUSCULAR; INTRAVENOUS; SUBCUTANEOUS at 04:24

## 2024-09-26 RX ADMIN — BUDESONIDE 500 MCG: 0.5 SUSPENSION RESPIRATORY (INHALATION) at 15:01

## 2024-09-26 RX ADMIN — ALBUTEROL SULFATE 2.5 MG: 2.5 SOLUTION RESPIRATORY (INHALATION) at 19:20

## 2024-09-26 RX ADMIN — GABAPENTIN 300 MG: 300 CAPSULE ORAL at 21:10

## 2024-09-26 RX ADMIN — PANTOPRAZOLE SODIUM 40 MG: 40 TABLET, DELAYED RELEASE ORAL at 06:18

## 2024-09-26 RX ADMIN — DOXYCYCLINE HYCLATE 100 MG: 100 CAPSULE ORAL at 21:10

## 2024-09-26 RX ADMIN — CITALOPRAM HYDROBROMIDE 20 MG: 20 TABLET ORAL at 08:48

## 2024-09-26 RX ADMIN — FUROSEMIDE 40 MG: 40 TABLET ORAL at 08:48

## 2024-09-26 RX ADMIN — DILTIAZEM HYDROCHLORIDE 30 MG: 60 TABLET, FILM COATED ORAL at 11:46

## 2024-09-26 RX ADMIN — SODIUM CHLORIDE, PRESERVATIVE FREE 5 ML: 5 INJECTION INTRAVENOUS at 08:49

## 2024-09-26 RX ADMIN — APIXABAN 5 MG: 5 TABLET, FILM COATED ORAL at 21:09

## 2024-09-26 RX ADMIN — ALBUTEROL SULFATE 2.5 MG: 2.5 SOLUTION RESPIRATORY (INHALATION) at 15:01

## 2024-09-26 RX ADMIN — DOXYCYCLINE HYCLATE 100 MG: 100 CAPSULE ORAL at 08:48

## 2024-09-26 RX ADMIN — ATORVASTATIN CALCIUM 40 MG: 40 TABLET, FILM COATED ORAL at 21:10

## 2024-09-26 RX ADMIN — SODIUM CHLORIDE, PRESERVATIVE FREE 10 ML: 5 INJECTION INTRAVENOUS at 21:10

## 2024-09-26 RX ADMIN — ACETAMINOPHEN 650 MG: 325 TABLET ORAL at 15:41

## 2024-09-26 ASSESSMENT — PAIN SCALES - GENERAL: PAINLEVEL_OUTOF10: 0

## 2024-09-26 ASSESSMENT — PAIN DESCRIPTION - ORIENTATION: ORIENTATION: LEFT

## 2024-09-26 ASSESSMENT — PAIN DESCRIPTION - LOCATION: LOCATION: ARM

## 2024-09-26 ASSESSMENT — PAIN DESCRIPTION - DESCRIPTORS: DESCRIPTORS: ACHING

## 2024-09-26 ASSESSMENT — PAIN SCALES - WONG BAKER: WONGBAKER_NUMERICALRESPONSE: NO HURT

## 2024-09-26 NOTE — PROGRESS NOTES
Hospitalist Progress Note   Admit Date:  2024  3:37 AM   Name:  Jeanne Nieto   Age:  84 y.o.  Sex:  female  :  1940   MRN:  [id number]   Room:  [facility]    Presenting/Chief Complaint: Shortness of Breath     Reason(s) for Admission: Severe persistent asthma with exacerbation [J45.51]  Acute on chronic respiratory failure with hypercapnia (HCC) [J96.22]  Acute respiratory failure with hypoxia and hypercapnia (HCC) [J96.01, J96.02]     Hospital Course:    84 y.o. female with history of persistent severe asthma, mitral valve stenosis, aortic valve stenosis, GERD, hypertension, paroxysmal A-fib, on Coumadin and hypothyroidism admitted to ICU on  for acute hypoxic/hypercapnic respiratory failure, requiring BiPAP support. CT PE negative for acute pulmonary embolism. Patient started on IV steroids and nebs treatment. Also given IV Lasix for concern for fluid overload. Cardiology consulted. BiPAP weaned off to nasal cannula.     Subjective & 24hr Events:   Patient was seen and examined at the bedside.  No overnight events.  Still some wheezing however patient states she feels better this morning.  Plan for overnight oximetry.  Patient currently on 2 L nasal cannula at 97%.  Refused BiPAP overnight      Assessment & Plan:   Acute on chronic hypoxic/hypercapnic respiratory failure  Persistent severe asthma  - Patient currently on 3 L nasal cannula satting at 97%  - Continue DuoNebs  - Doxycycline  - Continue Solu-Medrol 40 mg IV daily  - Singulair  - Overnight oximetry  - Encourage CPAP for RY    Acute diastolic heart failure  Moderate aortic stenosis  Moderate mitral stenosis  Hypertension/hyperlipidemia  Proximal atrial fibrillation  - Cardiology consulted, appreciate recommendations  - Patient currently in sinus rhythm off of warfarin.  - Cardiology started patient on Eliquis  - Cardiology started on low-dose diltiazem  - Echo with ejection fraction of 75%  - Continue p.o. Lasix  - Strict I's and  Value Ref Range    Troponin T 93.0 (H) 0 - 14 ng/L   Brain Natriuretic Peptide    Collection Time: 09/25/24 11:52 AM   Result Value Ref Range    NT Pro-BNP 2,166 (H) 0 - 450 PG/ML       Recent Labs     09/24/24  0347   COVID19 NOT DETECTED       Current Meds:  Current Facility-Administered Medications   Medication Dose Route Frequency    atorvastatin (LIPITOR) tablet 40 mg  40 mg Oral Nightly    budesonide (PULMICORT) nebulizer suspension 500 mcg  0.5 mg Nebulization BID RT    dilTIAZem injection 10 mg  10 mg IntraVENous Once    dilTIAZem (CARDIZEM) tablet 30 mg  30 mg Oral 4 times per day    apixaban (ELIQUIS) tablet 5 mg  5 mg Oral BID    pantoprazole (PROTONIX) tablet 40 mg  40 mg Oral QAM AC    furosemide (LASIX) tablet 40 mg  40 mg Oral Daily    methylPREDNISolone sodium succ (SOLU-MEDROL) 40 mg in sterile water 1 mL injection  40 mg IntraVENous Q12H    sodium chloride flush 0.9 % injection 5-40 mL  5-40 mL IntraVENous 2 times per day    sodium chloride flush 0.9 % injection 5-40 mL  5-40 mL IntraVENous PRN    0.9 % sodium chloride infusion   IntraVENous PRN    potassium chloride 10 mEq/100 mL IVPB (Peripheral Line)  10 mEq IntraVENous PRN    magnesium sulfate 2000 mg in 50 mL IVPB premix  2,000 mg IntraVENous PRN    ondansetron (ZOFRAN-ODT) disintegrating tablet 4 mg  4 mg Oral Q8H PRN    Or    ondansetron (ZOFRAN) injection 4 mg  4 mg IntraVENous Q6H PRN    polyethylene glycol (GLYCOLAX) packet 17 g  17 g Oral Daily PRN    acetaminophen (TYLENOL) tablet 650 mg  650 mg Oral Q6H PRN    Or    acetaminophen (TYLENOL) suppository 650 mg  650 mg Rectal Q6H PRN    albuterol (PROVENTIL) (2.5 MG/3ML) 0.083% nebulizer solution 2.5 mg  2.5 mg Nebulization Q4H WA RT    doxycycline hyclate (VIBRAMYCIN) capsule 100 mg  100 mg Oral 2 times per day    citalopram (CELEXA) tablet 20 mg  20 mg Oral Daily    levothyroxine (SYNTHROID) tablet 137 mcg  137 mcg Oral Daily    montelukast (SINGULAIR) tablet 10 mg  10 mg Oral Nightly

## 2024-09-26 NOTE — CARE COORDINATION
Admitted 9/24 with acute on chronic respiratory failure. Transferred from ICU. Currently on 2L supplemental oxygen with saturation at 97%. Hope to wean prior to DC. MD anticipates DC tomorrow. Discussed PT recs of HH. Patient wants to speak with friends in her community regarding best HH agency. She declines HH at this time. IM reviewed and scanned to chart, copy left with patient.

## 2024-09-26 NOTE — PLAN OF CARE
Problem: Respiratory - Adult  Goal: Achieves optimal ventilation and oxygenation  9/26/2024 0839 by Rema Lee RCP  Outcome: Progressing  9/25/2024 2059 by Yajaira Jorgensen RN  Outcome: Progressing

## 2024-09-26 NOTE — PROGRESS NOTES
[facility] CARDIOLOGY PROGRESS NOTE    9/26/2024 7:59 AM    Admit Date: 9/24/2024        Subjective:   Stable overnight without angina, CHF, or palpitations. Vitals stable and controlled. No other complaints overnight. Tolerating meds well.       Objective:      Vitals:    09/25/24 1913 09/25/24 2300 09/26/24 0030 09/26/24 0244   BP: (!) 153/75 (!) 148/70  (!) 151/85   Pulse: 61 59 55 64   Resp: 18 19     Temp: 99.5 °F (37.5 °C) 98.8 °F (37.1 °C)  98.1 °F (36.7 °C)   TempSrc: Oral Oral     SpO2: 90% 92% 91% 93%   Weight:       Height:           Physical Exam:  Neck- supple, no JVD  CV- regular rate and rhythm, 2/6 TONYA RUSB, no S3  Lung- clear bilaterally, decreased bibasilar without crackles or wheezing  Abd- soft, nontender, nondistended  Ext- no edema  Skin- warm and dry    Data Review:   Pertinent lab and noninvasive imaging over the past 24 hours reviewed and evaluated.    Recent Labs     09/24/24  0347 09/25/24  0319    140   K 4.2 4.2   MG 2.0  --    BUN 24* 23   WBC 9.6 9.7   HGB 11.9 10.8*   HCT 39.4 34.9*    242   INR 0.9  --      Lab Results   Component Value Date    .8 (H) 11/12/2023     Echo 9/24/24:  Left Ventricle Hyperdynamic left ventricular systolic function. EF by visual approximation is 75%. Left ventricle size is normal. Normal wall thickness. Normal wall motion. Abnormal diastolic function.   Left Atrium Left atrium is mildly dilated.   Interatrial Septum There is a calcified mass attached to the interatrial septum.  This remains unchanged from prior echocardiograms.   Right Ventricle Right ventricle size is normal. Normal systolic function.   Right Atrium Right atrium is mildly dilated.   Aortic Valve Calcified cusps. No regurgitation. Moderate stenosis of the aortic valve. AV mean gradient is 26 mmHg. AV peak gradient is 54 mmHg. LVOT:AV VTI Index is 0.70. AV area by continuity VTI is 2.2 cm2. AV Stroke Volume index is 80.6 mL/m2.   Mitral Valve Annular calcification.

## 2024-09-26 NOTE — PROGRESS NOTES
Physician Progress Note      PATIENT:               BAO PULIDO  CSN #:                  [id number]  :                       1940  ADMIT DATE:       2024 3:37 AM  DISCH DATE:  RESPONDING  PROVIDER #:        Ellis Abbasi MD          QUERY TEXT:    Patient admitted with Acute hypoxemic/hypercapnic respiratory failure. Noted   Trop T trend:  -[id number]. If possible, please document in the progress   notes and discharge summary if you are evaluating and/or treating any of the   following:    The medical record reflects the following:  Risk Factors: age 84, Acute hypoxemic/hypercapnic respiratory failure, MV and   moderate AV stenosis, acute diastolic CHF, severe persistent asthma   exacerbation, SVT  Clinical Indicators: admitted with acute diastolic CHF. Trop T trend:    , EKG nonischemic with T wave abnormality, dyspnea on exertion,   need for bipap support and O2 NC. SVT with HR , no chest pain.  Treatment:  IV NS bolus x1, IV solumedrol, IV lopressor x1, IV adenocard x1,   IV lasix x1. Pulm and cardiology consult, labs, imaging, supplemental oxygen,   Albuterol nebulizers?    Thank you,  Kerri Buchanan RN  Clinical   Options provided:  -- NSTEMI  -- Type 2 MI  -- Demand Ischemia with MI  -- Demand Ischemia only, no MI  -- Other - I will add my own diagnosis  -- Disagree - Not applicable / Not valid  -- Disagree - Clinically unable to determine / Unknown  -- Refer to Clinical Documentation Reviewer    PROVIDER RESPONSE TEXT:    This patient has demand ischemia only, no MI.    Query created by: Kerri Buchanan on 2024 1:05 PM      Electronically signed by:  Ellis Abbasi MD 2024 3:18 PM

## 2024-09-26 NOTE — PROGRESS NOTES
Jeanne Nieto  Admission Date: 9/24/2024         Daily Progress Note: 9/26/2024    The patient's chart is reviewed and the patient is discussed with the staff.    Background: Pt is an 85 yo female with asthma (may be COPD with severe obstruction on spirometry and no bronchodilator response testing. On advair 500 and prn albuterol at home), PAF, MV stenosis, AV stenosis, GERD, and RY intolerance. Pt presented to the ER on 9/24 with shortness of breath and BLE edema. BNP was elevated.   TTE showed EF 75%, moderate AS, MV not well seen, RVSP 49L and R atria mildly dilated, chronic calcified mass on the interatrial septum.   CT PE 9/24 no PE, consolidation, large hiatal hernia, calcified pancreatic tail mass.   Pt required BiPAP with CO2 76. Down to 63 after an hour of bipap. RVP negative. Admitted to the ICU. Treated with albuterol nebs and IV steroids for possible asthma exacerbation but also diuresed with suspcion for volume overload component.   Pt improved and transferred to the floor.   Subjective:     Pt is sitting up in bed on 3.5L sat 95%. Pt did not wear BiPAP overnight. She did not have an CARMITA on RA either. She reports that she refused BiPAP overnight.   Pt is coughing but cough is dry. She admits to wheezing.     Current Facility-Administered Medications   Medication Dose Route Frequency    dilTIAZem injection 10 mg  10 mg IntraVENous Once    dilTIAZem (CARDIZEM) tablet 30 mg  30 mg Oral 4 times per day    apixaban (ELIQUIS) tablet 5 mg  5 mg Oral BID    pantoprazole (PROTONIX) tablet 40 mg  40 mg Oral QAM AC    furosemide (LASIX) tablet 40 mg  40 mg Oral Daily    methylPREDNISolone sodium succ (SOLU-MEDROL) 40 mg in sterile water 1 mL injection  40 mg IntraVENous Q12H    sodium chloride flush 0.9 % injection 5-40 mL  5-40 mL IntraVENous 2 times per day    sodium chloride flush 0.9 % injection 5-40 mL  5-40 mL IntraVENous PRN    0.9 % sodium chloride infusion   IntraVENous PRN    potassium

## 2024-09-27 PROBLEM — I47.10 SVT (SUPRAVENTRICULAR TACHYCARDIA) (HCC): Status: ACTIVE | Noted: 2023-11-15

## 2024-09-27 LAB
ANION GAP SERPL CALC-SCNC: 13 MMOL/L (ref 9–18)
ANION GAP SERPL CALC-SCNC: 8 MMOL/L (ref 9–18)
BASOPHILS # BLD: 0 K/UL (ref 0–0.2)
BASOPHILS NFR BLD: 0 % (ref 0–2)
BUN SERPL-MCNC: 25 MG/DL (ref 8–23)
BUN SERPL-MCNC: 27 MG/DL (ref 8–23)
CALCIUM SERPL-MCNC: 9.3 MG/DL (ref 8.8–10.2)
CALCIUM SERPL-MCNC: 9.4 MG/DL (ref 8.8–10.2)
CHLORIDE SERPL-SCNC: 100 MMOL/L (ref 98–107)
CHLORIDE SERPL-SCNC: 99 MMOL/L (ref 98–107)
CO2 SERPL-SCNC: 27 MMOL/L (ref 20–28)
CO2 SERPL-SCNC: 30 MMOL/L (ref 20–28)
CREAT SERPL-MCNC: 0.8 MG/DL (ref 0.6–1.1)
CREAT SERPL-MCNC: 1 MG/DL (ref 0.6–1.1)
DIFFERENTIAL METHOD BLD: ABNORMAL
EOSINOPHIL # BLD: 0 K/UL (ref 0–0.8)
EOSINOPHIL NFR BLD: 0 % (ref 0.5–7.8)
ERYTHROCYTE [DISTWIDTH] IN BLOOD BY AUTOMATED COUNT: 14.5 % (ref 11.9–14.6)
GLUCOSE BLD STRIP.AUTO-MCNC: 202 MG/DL (ref 65–100)
GLUCOSE SERPL-MCNC: 153 MG/DL (ref 70–99)
GLUCOSE SERPL-MCNC: 222 MG/DL (ref 70–99)
HCT VFR BLD AUTO: 36.3 % (ref 35.8–46.3)
HGB BLD-MCNC: 11 G/DL (ref 11.7–15.4)
IMM GRANULOCYTES # BLD AUTO: 0 K/UL (ref 0–0.5)
IMM GRANULOCYTES NFR BLD AUTO: 1 % (ref 0–5)
LYMPHOCYTES # BLD: 0.4 K/UL (ref 0.5–4.6)
LYMPHOCYTES NFR BLD: 6 % (ref 13–44)
MAGNESIUM SERPL-MCNC: 2.2 MG/DL (ref 1.8–2.4)
MAGNESIUM SERPL-MCNC: 2.2 MG/DL (ref 1.8–2.4)
MCH RBC QN AUTO: 27.9 PG (ref 26.1–32.9)
MCHC RBC AUTO-ENTMCNC: 30.3 G/DL (ref 31.4–35)
MCV RBC AUTO: 92.1 FL (ref 82–102)
MONOCYTES # BLD: 0.4 K/UL (ref 0.1–1.3)
MONOCYTES NFR BLD: 6 % (ref 4–12)
NEUTS SEG # BLD: 6.1 K/UL (ref 1.7–8.2)
NEUTS SEG NFR BLD: 88 % (ref 43–78)
NRBC # BLD: 0 K/UL (ref 0–0.2)
PLATELET # BLD AUTO: 271 K/UL (ref 150–450)
PMV BLD AUTO: 9.9 FL (ref 9.4–12.3)
POTASSIUM SERPL-SCNC: 4.4 MMOL/L (ref 3.5–5.1)
POTASSIUM SERPL-SCNC: 4.5 MMOL/L (ref 3.5–5.1)
RBC # BLD AUTO: 3.94 M/UL (ref 4.05–5.2)
SERVICE CMNT-IMP: ABNORMAL
SODIUM SERPL-SCNC: 139 MMOL/L (ref 136–145)
SODIUM SERPL-SCNC: 139 MMOL/L (ref 136–145)
WBC # BLD AUTO: 7 K/UL (ref 4.3–11.1)

## 2024-09-27 PROCEDURE — 6370000000 HC RX 637 (ALT 250 FOR IP): Performed by: INTERNAL MEDICINE

## 2024-09-27 PROCEDURE — 82962 GLUCOSE BLOOD TEST: CPT

## 2024-09-27 PROCEDURE — 83735 ASSAY OF MAGNESIUM: CPT

## 2024-09-27 PROCEDURE — 2580000003 HC RX 258: Performed by: NURSE PRACTITIONER

## 2024-09-27 PROCEDURE — 99232 SBSQ HOSP IP/OBS MODERATE 35: CPT | Performed by: INTERNAL MEDICINE

## 2024-09-27 PROCEDURE — 94640 AIRWAY INHALATION TREATMENT: CPT

## 2024-09-27 PROCEDURE — 6370000000 HC RX 637 (ALT 250 FOR IP): Performed by: PHYSICIAN ASSISTANT

## 2024-09-27 PROCEDURE — 6370000000 HC RX 637 (ALT 250 FOR IP): Performed by: NURSE PRACTITIONER

## 2024-09-27 PROCEDURE — 36415 COLL VENOUS BLD VENIPUNCTURE: CPT

## 2024-09-27 PROCEDURE — 6360000002 HC RX W HCPCS

## 2024-09-27 PROCEDURE — 6360000002 HC RX W HCPCS: Performed by: NURSE PRACTITIONER

## 2024-09-27 PROCEDURE — 80048 BASIC METABOLIC PNL TOTAL CA: CPT

## 2024-09-27 PROCEDURE — 85025 COMPLETE CBC W/AUTO DIFF WBC: CPT

## 2024-09-27 PROCEDURE — 2700000000 HC OXYGEN THERAPY PER DAY

## 2024-09-27 PROCEDURE — 6360000002 HC RX W HCPCS: Performed by: INTERNAL MEDICINE

## 2024-09-27 PROCEDURE — 2580000003 HC RX 258

## 2024-09-27 PROCEDURE — 94761 N-INVAS EAR/PLS OXIMETRY MLT: CPT

## 2024-09-27 PROCEDURE — 1100000003 HC PRIVATE W/ TELEMETRY

## 2024-09-27 PROCEDURE — 6370000000 HC RX 637 (ALT 250 FOR IP): Performed by: FAMILY MEDICINE

## 2024-09-27 PROCEDURE — 94762 N-INVAS EAR/PLS OXIMTRY CONT: CPT

## 2024-09-27 RX ORDER — METOPROLOL TARTRATE 1 MG/ML
2.5 INJECTION, SOLUTION INTRAVENOUS EVERY 6 HOURS PRN
Status: DISCONTINUED | OUTPATIENT
Start: 2024-09-27 | End: 2024-09-28 | Stop reason: HOSPADM

## 2024-09-27 RX ORDER — PREDNISONE 20 MG/1
40 TABLET ORAL DAILY
Status: DISCONTINUED | OUTPATIENT
Start: 2024-09-28 | End: 2024-09-27

## 2024-09-27 RX ORDER — ALBUTEROL SULFATE 0.83 MG/ML
2.5 SOLUTION RESPIRATORY (INHALATION) EVERY 4 HOURS PRN
Status: DISCONTINUED | OUTPATIENT
Start: 2024-09-27 | End: 2024-09-28 | Stop reason: HOSPADM

## 2024-09-27 RX ADMIN — WATER 40 MG: 1 INJECTION INTRAMUSCULAR; INTRAVENOUS; SUBCUTANEOUS at 09:19

## 2024-09-27 RX ADMIN — FUROSEMIDE 40 MG: 40 TABLET ORAL at 09:12

## 2024-09-27 RX ADMIN — PANTOPRAZOLE SODIUM 40 MG: 40 TABLET, DELAYED RELEASE ORAL at 06:09

## 2024-09-27 RX ADMIN — ALUMINUM HYDROXIDE, MAGNESIUM HYDROXIDE, AND SIMETHICONE 30 ML: 200; 200; 20 SUSPENSION ORAL at 15:24

## 2024-09-27 RX ADMIN — CITALOPRAM HYDROBROMIDE 20 MG: 20 TABLET ORAL at 09:12

## 2024-09-27 RX ADMIN — APIXABAN 5 MG: 5 TABLET, FILM COATED ORAL at 09:13

## 2024-09-27 RX ADMIN — MONTELUKAST 10 MG: 10 TABLET, FILM COATED ORAL at 20:32

## 2024-09-27 RX ADMIN — ALBUTEROL SULFATE 2.5 MG: 2.5 SOLUTION RESPIRATORY (INHALATION) at 09:32

## 2024-09-27 RX ADMIN — DILTIAZEM HYDROCHLORIDE 30 MG: 60 TABLET, FILM COATED ORAL at 00:09

## 2024-09-27 RX ADMIN — DOXYCYCLINE HYCLATE 100 MG: 100 CAPSULE ORAL at 09:12

## 2024-09-27 RX ADMIN — LEVOTHYROXINE SODIUM 137 MCG: 0.11 TABLET ORAL at 06:13

## 2024-09-27 RX ADMIN — GABAPENTIN 300 MG: 300 CAPSULE ORAL at 20:32

## 2024-09-27 RX ADMIN — ATORVASTATIN CALCIUM 40 MG: 40 TABLET, FILM COATED ORAL at 20:32

## 2024-09-27 RX ADMIN — BUDESONIDE 500 MCG: 0.5 SUSPENSION RESPIRATORY (INHALATION) at 20:12

## 2024-09-27 RX ADMIN — SODIUM CHLORIDE, PRESERVATIVE FREE 10 ML: 5 INJECTION INTRAVENOUS at 09:13

## 2024-09-27 RX ADMIN — DILTIAZEM HYDROCHLORIDE 30 MG: 60 TABLET, FILM COATED ORAL at 06:09

## 2024-09-27 RX ADMIN — DILTIAZEM HYDROCHLORIDE 30 MG: 60 TABLET, FILM COATED ORAL at 17:58

## 2024-09-27 RX ADMIN — WATER 40 MG: 1 INJECTION INTRAMUSCULAR; INTRAVENOUS; SUBCUTANEOUS at 06:09

## 2024-09-27 RX ADMIN — APIXABAN 5 MG: 5 TABLET, FILM COATED ORAL at 20:32

## 2024-09-27 RX ADMIN — SODIUM CHLORIDE, PRESERVATIVE FREE 10 ML: 5 INJECTION INTRAVENOUS at 20:33

## 2024-09-27 RX ADMIN — DOXYCYCLINE HYCLATE 100 MG: 100 CAPSULE ORAL at 20:32

## 2024-09-27 RX ADMIN — BUDESONIDE 500 MCG: 0.5 SUSPENSION RESPIRATORY (INHALATION) at 09:32

## 2024-09-27 NOTE — PROGRESS NOTES
Patient in SVT HR sustaining 160's, no complaints of chest pain or SOB. Patient complained     0022: Rapid response called, [place] monitor applied, HR still sustaining in the 160's.    0024: ICU joanna RN, Contreras Mosley, NP Nia Sanchez, MD Stiven Hook, MD Wray arrived at this time.  . /89 (map 104), , RR 22    0025: Attempted valsalva maneuvers without success. MD Wray performed carotid massage. Patient convert to NSR 70's    0026: MD Hook ordered 2.5 mg IV metoprolol q6 for High Blood Pressure, Tachycardia, HR greater than 100. Patient stable at this time, Rapid response terminated at this time.

## 2024-09-27 NOTE — ICUWATCH
Rapid Response Team Note      Subjective: Responded to Rapid Response secondary to SVT.    Summary: Pt with HR on telemetry monitor in the 150-170's, appears SVT in morphology. Similar episode with subsequent lopressor and adenosine administration on 9/25. On exam, pt is alert and oriented, following commands and answering questions appropriately. Unlabored, regular breathing on RA. No major complaints of CP, SOB or dizziness. Only feeling is generalized discomfort.    Nia Jara, YUNI and Dr. Wray responded to the rapid response. Multiple attempts of vagal maneuvers used, with conversion to NSR in 70's with carotid massage. No pharmacologic intervention used. Pt's discomfort resolved, BP remained stable the entirety of the rapid response. PRN orders placed for Lopressor IV for HR > 100.    See Rapid Response/Code Blue Narrator for full documentation    Outcome: Patient to remain in current location. Will follow-up per Rapid Response Team Clinical Rounding protocol.      Contreras Mosley RN  [facility]: [phone]  [facility] [place]: [phone]

## 2024-09-27 NOTE — PROGRESS NOTES
Hospitalist Progress Note   Admit Date:  2024  3:37 AM   Name:  Jeanne Nieto   Age:  84 y.o.  Sex:  female  :  1940   MRN:  [id number]   Room:  [facility]    Presenting/Chief Complaint: Shortness of Breath     Reason(s) for Admission: Severe persistent asthma with exacerbation [J45.51]  Acute on chronic respiratory failure with hypercapnia [J96.22]  Acute respiratory failure with hypoxia and hypercapnia [J96.01, J96.02]     Hospital Course:    84 y.o. female with history of persistent severe asthma, mitral valve stenosis, aortic valve stenosis, GERD, hypertension, paroxysmal A-fib, on Coumadin and hypothyroidism admitted to ICU on  for acute hypoxic/hypercapnic respiratory failure, requiring BiPAP support. CT PE negative for acute pulmonary embolism. Patient started on IV steroids and nebs treatment. Also given IV Lasix for concern for fluid overload. Cardiology consulted. BiPAP weaned off to nasal cannula.     Subjective & 24hr Events:   Patient was seen and examined at the bedside.  Overnight patient had episode of SVT.  Patient feeling better this morning.  Sitting in chair comfortably.  Denied any cardiac chest pain, shortness of breath no abdominal pain, fever/chills.      Assessment & Plan:   Acute on chronic hypoxic/hypercapnic respiratory failure  Persistent severe asthma  -Patient currently on 2 L nasal cannula satting at 91%  - Continue DuoNebs  - Doxycycline  - Continue Solu-Medrol 40 mg IV daily  - Singulair  - Overnight oximetry  - Encourage CPAP for RY    Acute diastolic heart failure  Moderate aortic stenosis  Moderate mitral stenosis  Hypertension/hyperlipidemia  Proximal atrial fibrillation  SVT  - Cardiology consulted, appreciate recommendations  - Patient currently in sinus rhythm off of warfarin.  - Cardiology started patient on Eliquis  - Cardiology started on low-dose diltiazem  - Echo with ejection fraction of 75%  - Continue p.o. Lasix  - Strict I's and O's  - Will likely  Glucose 222 (H) 70 - 99 mg/dL    BUN 27 (H) 8 - 23 MG/DL    Creatinine 1.00 0.60 - 1.10 MG/DL    Est, Glom Filt Rate 56 (L) >60 ml/min/1.73m2    Calcium 9.4 8.8 - 10.2 MG/DL   Basic Metabolic Panel    Collection Time: 09/27/24  4:24 AM   Result Value Ref Range    Sodium 139 136 - 145 mmol/L    Potassium 4.5 3.5 - 5.1 mmol/L    Chloride 100 98 - 107 mmol/L    CO2 30 (H) 20 - 28 mmol/L    Anion Gap 8 (L) 9 - 18 mmol/L    Glucose 153 (H) 70 - 99 mg/dL    BUN 25 (H) 8 - 23 MG/DL    Creatinine 0.80 0.60 - 1.10 MG/DL    Est, Glom Filt Rate 73 >60 ml/min/1.73m2    Calcium 9.3 8.8 - 10.2 MG/DL   CBC with Auto Differential    Collection Time: 09/27/24  4:24 AM   Result Value Ref Range    WBC 7.0 4.3 - 11.1 K/uL    RBC 3.94 (L) 4.05 - 5.2 M/uL    Hemoglobin 11.0 (L) 11.7 - 15.4 g/dL    Hematocrit 36.3 35.8 - 46.3 %    MCV 92.1 82 - 102 FL    MCH 27.9 26.1 - 32.9 PG    MCHC 30.3 (L) 31.4 - 35.0 g/dL    RDW 14.5 11.9 - 14.6 %    Platelets 271 150 - 450 K/uL    MPV 9.9 9.4 - 12.3 FL    nRBC 0.00 0.0 - 0.2 K/uL    Differential Type AUTOMATED      Neutrophils % 88 (H) 43 - 78 %    Lymphocytes % 6 (L) 13 - 44 %    Monocytes % 6 4.0 - 12.0 %    Eosinophils % 0 (L) 0.5 - 7.8 %    Basophils % 0 0.0 - 2.0 %    Immature Granulocytes % 1 0.0 - 5.0 %    Neutrophils Absolute 6.1 1.7 - 8.2 K/UL    Lymphocytes Absolute 0.4 (L) 0.5 - 4.6 K/UL    Monocytes Absolute 0.4 0.1 - 1.3 K/UL    Eosinophils Absolute 0.0 0.0 - 0.8 K/UL    Basophils Absolute 0.0 0.0 - 0.2 K/UL    Immature Granulocytes Absolute 0.0 0.0 - 0.5 K/UL   Magnesium    Collection Time: 09/27/24  4:24 AM   Result Value Ref Range    Magnesium 2.2 1.8 - 2.4 mg/dL       No results for input(s): \"COVID19\" in the last 72 hours.      Current Meds:  Current Facility-Administered Medications   Medication Dose Route Frequency    metoprolol (LOPRESSOR) injection 2.5 mg  2.5 mg IntraVENous Q6H PRN    methylPREDNISolone sodium succ (SOLU-MEDROL) 40 mg in sterile water 1 mL injection  40 mg

## 2024-09-27 NOTE — PROGRESS NOTES
am  9/27/2024 6:49 AM    Admit Date: 9/24/2024    Admit Diagnosis: Severe persistent asthma with exacerbation [J45.51]  Acute on chronic respiratory failure with hypercapnia [J96.22]  Acute respiratory failure with hypoxia and hypercapnia [J96.01, J96.02]      Subjective:    Patient : Stable overnight, tolerating meds well. Afib controlled, minimal BLE edema.  Vitals stable and controlled. No other complaints overnight. Tolerating meds well.     Objective:    BP (!) 128/55 Comment: RN Yajaira notified  Pulse 55   Temp 97.7 °F (36.5 °C) (Oral)   Resp 17   Ht 1.524 m (5')   Wt 96.4 kg (212 lb 8.4 oz)   SpO2 91%   BMI 41.51 kg/m²     ROS:  General ROS: negative for - chills  Hematological and Lymphatic ROS: negative for - blood clots or jaundice  Respiratory ROS: no cough, shortness of breath, or wheezing  Cardiovascular ROS: no chest pain or dyspnea on exertion  Gastrointestinal ROS: no abdominal pain, change in bowel habits, or black or bloody stools  Neurological ROS: no TIA or stroke symptoms    Physical Exam:      Physical Examination: General appearance - Appearance: alert, well appearing, and in no distress.   Neck/lymph - supple, no significant adenopathy  Chest/CV - clear to auscultation, no wheezes, rales or rhonchi, symmetric air entry  Heart - normal rate, A-fib controlled, normal S1, S2, no murmurs, rubs, clicks or gallops  Abdomen/GI - soft, nontender, nondistended, no masses or organomegaly   Musculoskeletal - no joint tenderness, deformity or swelling  Extremities - peripheral pulses normal, no pedal edema, no clubbing or cyanosis  Skin - normal coloration and turgor, no rashes, no suspicious skin lesions noted    Current Facility-Administered Medications   Medication Dose Route Frequency    metoprolol (LOPRESSOR) injection 2.5 mg  2.5 mg IntraVENous Q6H PRN    atorvastatin (LIPITOR) tablet 40 mg  40 mg Oral Nightly    budesonide (PULMICORT) nebulizer suspension 500 mcg  0.5 mg Nebulization BID RT

## 2024-09-27 NOTE — PROGRESS NOTES
Daily Progress Note: 9/27/2024    Jeanne Nieto  Admission Date: 9/24/2024     Length of Stay: 3 days      Background: Pt is an 83 yo female with asthma (may be COPD with severe obstruction on spirometry. On advair 500 and prn albuterol at home), PAF, MV stenosis, AV stenosis, GERD, and RY intolerance. Pt presented to the ER on 9/24 with shortness of breath and BLE edema. BNP was elevated.   TTE showed EF 75%, moderate AS, MV not well seen, RVSP 49L and R atria mildly dilated, chronic calcified mass on the interatrial septum.   CT PE 9/24 no PE, consolidation, large hiatal hernia, calcified pancreatic tail mass.   Pt required BiPAP with CO2 76. Down to 63 after an hour of bipap. RVP negative. Admitted to the ICU. Treated with albuterol nebs and IV steroids for possible asthma exacerbation but also diuresed with suspcion for volume overload component.   Pt improved and transferred to the floor. Having lots of LE edema at home and exertional dyspnea, tells me it's different than her asthma    Subjective:     Now on 2 lpm, sat 95%  Elevated abs eso and BNP on admission. + edema and untreated RY.  Has been unable to tolerate CPAP or BIPAP in the past  CARMITA on RA with desaturations but sampling time was off. Will repeat tonight on 3 lpm and I discussed with RT monitoring time.    Review of Systems  Constitutional: negative for fever, chills, sweats  Cardiovascular: negative for chest pain, palpitations, syncope, edema  Gastrointestinal:  negative for dysphagia, reflux, vomiting, diarrhea, abdominal pain, or melena  Neurologic:  negative for focal weakness, numbness, headache    Current Facility-Administered Medications   Medication Dose Route Frequency    metoprolol (LOPRESSOR) injection 2.5 mg  2.5 mg IntraVENous Q6H PRN    atorvastatin (LIPITOR) tablet 40 mg  40 mg Oral Nightly    budesonide (PULMICORT) nebulizer suspension 500 mcg  0.5 mg Nebulization BID RT    dilTIAZem injection 10 mg  10 mg IntraVENous  dated 11/20/2023    FINDINGS:    Pulmonary arteries:  No acute pulmonary embolism.  The main pulmonary  artery is distended. A dilated pulmonary artery may be due to  pulmonary arterial hypertension, chronic pulmonary embolus or other  pulmonary diseases.    Aorta:  No aneurysm of the thoracic aorta.  Atherosclerotic disease.    Lungs:  No consolidation of pneumonia.    Pleural space:  Unremarkable.  No significant effusion.  No  pneumothorax.    Heart:  Coronary artery calcifications are present.    Mediastinum:  Large hiatal hernia.    Bones/joints:  Diffuse spondylosis of the vertebral bodies of the  thoracic spine.  Old healed fracture of the manubrium of the sternum.  Old compression fractures of L1, L2.  Bilateral total shoulder  arthroplasties.  Components appear well seated.  No dislocation.    Soft tissues:  Unremarkable.    Lymph nodes:  Unremarkable.  No enlarged lymph nodes.    Gallbladder and bile ducts:  Prior cholecystectomy.    Pancreas:  Calcified 3.5 cm lesion in the tail of the pancreas is  likely related to old partially calcified pancreatic pseudocyst, no  change.  Pancreatic neoplasm is not excluded but felt to be much less  likely.    Impression  1.  No acute pulmonary embolism.  2.  No aneurysm of the thoracic aorta.  3.  No consolidation of pneumonia.  4.  The main pulmonary artery is distended. A dilated pulmonary artery  may be due to pulmonary arterial hypertension, chronic pulmonary  embolus or other pulmonary diseases.  5.  Large hiatal hernia.  6.  Calcified 3.5 cm lesion in the tail of the pancreas is likely  related to old partially calcified pancreatic pseudocyst, no change.  Pancreatic neoplasm is not excluded but felt to be much less likely.      Automatic exposure control was used as a dose lowering technique.    Radiation Dose: CTDI is 37.98 mGy. DLP is 749.61 mGy-cm.    Contrast Type: iopamidol (ISOVUE-370) 76 . Contrast Volume:  75 mL    Report signed on 09/24/2024 (07:08

## 2024-09-27 NOTE — PROGRESS NOTES
Shift report received from previous RN. Pt in bed. Respirations even and unlabored on 2L NC. No signs of distress, pt denies any pain. Pt instructed to utilize call light if any assistance was needed. Pt verbalized understanding. No further needs stated by pt.

## 2024-09-27 NOTE — ICUWATCH
RRT Clinical Rounding Nurse Update    Vitals:    09/27/24 0932 09/27/24 1758 09/27/24 1838 09/27/24 1927   BP:   123/66 137/69   Pulse: 73 75 58 61   Resp: 16   20   Temp:    98.4 °F (36.9 °C)   TempSrc:    Oral   SpO2: 91%   93%   Weight:       Height:            DETERIORATION INDEX SCORE: 29    ASSESSMENT:  Previous outreach assessment and chart were reviewed.  NSR on tele monitor- HR 61. Pt resting in bed and appears to be in NAD at this time.    PLAN:  Will discharge from RRT Clinical Rounding Program per protocol. Please call if needed.    Lizz Valentin RN  [facility]: [phone]  [facility]: [phone]

## 2024-09-27 NOTE — PROGRESS NOTES
Collection Time: 09/25/24  3:19 AM   Result Value Ref Range    Sodium 140 136 - 145 mmol/L    Potassium 4.2 3.5 - 5.1 mmol/L    Chloride 101 98 - 107 mmol/L    CO2 30 (H) 20 - 28 mmol/L    Anion Gap 10 9 - 18 mmol/L    Glucose 134 (H) 70 - 99 mg/dL    BUN 23 8 - 23 MG/DL    Creatinine 0.82 0.60 - 1.10 MG/DL    Est, Glom Filt Rate 72 >60 ml/min/1.73m2    Calcium 9.3 8.8 - 10.2 MG/DL   CBC with Auto Differential    Collection Time: 09/25/24  3:19 AM   Result Value Ref Range    WBC 9.7 4.3 - 11.1 K/uL    RBC 3.80 (L) 4.05 - 5.2 M/uL    Hemoglobin 10.8 (L) 11.7 - 15.4 g/dL    Hematocrit 34.9 (L) 35.8 - 46.3 %    MCV 91.8 82 - 102 FL    MCH 28.4 26.1 - 32.9 PG    MCHC 30.9 (L) 31.4 - 35.0 g/dL    RDW 14.6 11.9 - 14.6 %    Platelets 242 150 - 450 K/uL    MPV 9.9 9.4 - 12.3 FL    nRBC 0.00 0.0 - 0.2 K/uL    Differential Type AUTOMATED      Neutrophils % 85 (H) 43 - 78 %    Lymphocytes % 6 (L) 13 - 44 %    Monocytes % 8 4.0 - 12.0 %    Eosinophils % 0 (L) 0.5 - 7.8 %    Basophils % 0 0.0 - 2.0 %    Immature Granulocytes % 1 0.0 - 5.0 %    Neutrophils Absolute 8.2 1.7 - 8.2 K/UL    Lymphocytes Absolute 0.6 0.5 - 4.6 K/UL    Monocytes Absolute 0.8 0.1 - 1.3 K/UL    Eosinophils Absolute 0.0 0.0 - 0.8 K/UL    Basophils Absolute 0.0 0.0 - 0.2 K/UL    Immature Granulocytes Absolute 0.1 0.0 - 0.5 K/UL   Troponin    Collection Time: 09/25/24  3:19 AM   Result Value Ref Range    Troponin T 121.0 (HH) 0 - 14 ng/L   Troponin    Collection Time: 09/25/24  5:14 AM   Result Value Ref Range    Troponin T 106.0 (HH) 0 - 14 ng/L   Troponin    Collection Time: 09/25/24  7:30 AM   Result Value Ref Range    Troponin T 93.0 (H) 0 - 14 ng/L   Brain Natriuretic Peptide    Collection Time: 09/25/24 11:52 AM   Result Value Ref Range    NT Pro-BNP 2,166 (H) 0 - 450 PG/ML   POCT Glucose    Collection Time: 09/27/24 12:24 AM   Result Value Ref Range    POC Glucose 202 (H) 65 - 100 mg/dL    Performed by: Isidro        Other Studies:  No  results found.    Current Meds:  Current Facility-Administered Medications   Medication Dose Route Frequency    metoprolol (LOPRESSOR) injection 2.5 mg  2.5 mg IntraVENous Q6H PRN    atorvastatin (LIPITOR) tablet 40 mg  40 mg Oral Nightly    budesonide (PULMICORT) nebulizer suspension 500 mcg  0.5 mg Nebulization BID RT    dilTIAZem injection 10 mg  10 mg IntraVENous Once    dilTIAZem (CARDIZEM) tablet 30 mg  30 mg Oral 4 times per day    apixaban (ELIQUIS) tablet 5 mg  5 mg Oral BID    pantoprazole (PROTONIX) tablet 40 mg  40 mg Oral QAM AC    furosemide (LASIX) tablet 40 mg  40 mg Oral Daily    methylPREDNISolone sodium succ (SOLU-MEDROL) 40 mg in sterile water 1 mL injection  40 mg IntraVENous Q12H    sodium chloride flush 0.9 % injection 5-40 mL  5-40 mL IntraVENous 2 times per day    sodium chloride flush 0.9 % injection 5-40 mL  5-40 mL IntraVENous PRN    0.9 % sodium chloride infusion   IntraVENous PRN    potassium chloride 10 mEq/100 mL IVPB (Peripheral Line)  10 mEq IntraVENous PRN    magnesium sulfate 2000 mg in 50 mL IVPB premix  2,000 mg IntraVENous PRN    ondansetron (ZOFRAN-ODT) disintegrating tablet 4 mg  4 mg Oral Q8H PRN    Or    ondansetron (ZOFRAN) injection 4 mg  4 mg IntraVENous Q6H PRN    polyethylene glycol (GLYCOLAX) packet 17 g  17 g Oral Daily PRN    acetaminophen (TYLENOL) tablet 650 mg  650 mg Oral Q6H PRN    Or    acetaminophen (TYLENOL) suppository 650 mg  650 mg Rectal Q6H PRN    albuterol (PROVENTIL) (2.5 MG/3ML) 0.083% nebulizer solution 2.5 mg  2.5 mg Nebulization Q4H WA RT    doxycycline hyclate (VIBRAMYCIN) capsule 100 mg  100 mg Oral 2 times per day    citalopram (CELEXA) tablet 20 mg  20 mg Oral Daily    levothyroxine (SYNTHROID) tablet 137 mcg  137 mcg Oral Daily    montelukast (SINGULAIR) tablet 10 mg  10 mg Oral Nightly    aluminum & magnesium hydroxide-simethicone (MAALOX) 200-200-20 MG/5ML suspension 30 mL  30 mL Oral Q6H PRN    gabapentin (NEURONTIN) capsule 300 mg  300 mg

## 2024-09-28 VITALS
RESPIRATION RATE: 20 BRPM | HEIGHT: 60 IN | OXYGEN SATURATION: 100 % | DIASTOLIC BLOOD PRESSURE: 73 MMHG | WEIGHT: 212.52 LBS | SYSTOLIC BLOOD PRESSURE: 137 MMHG | BODY MASS INDEX: 41.72 KG/M2 | HEART RATE: 56 BPM | TEMPERATURE: 98.4 F

## 2024-09-28 LAB — IGE SERPL-ACNC: 98 IU/ML (ref 6–495)

## 2024-09-28 PROCEDURE — 6370000000 HC RX 637 (ALT 250 FOR IP): Performed by: INTERNAL MEDICINE

## 2024-09-28 RX ORDER — PREDNISONE 10 MG/1
TABLET ORAL
Qty: 18 TABLET | Refills: 0 | Status: SHIPPED | OUTPATIENT
Start: 2024-09-28

## 2024-09-28 RX ORDER — FUROSEMIDE 40 MG
40 TABLET ORAL DAILY
Qty: 30 TABLET | Refills: 0 | Status: SHIPPED | OUTPATIENT
Start: 2024-09-28 | End: 2024-10-28

## 2024-09-28 RX ORDER — DOXYCYCLINE 100 MG/1
100 CAPSULE ORAL EVERY 12 HOURS SCHEDULED
Qty: 2 CAPSULE | Refills: 0 | Status: SHIPPED | OUTPATIENT
Start: 2024-09-28 | End: 2024-09-29

## 2024-09-28 RX ORDER — DILTIAZEM HYDROCHLORIDE 120 MG/1
120 CAPSULE, COATED, EXTENDED RELEASE ORAL DAILY
Qty: 30 CAPSULE | Refills: 0 | Status: SHIPPED | OUTPATIENT
Start: 2024-09-28 | End: 2024-10-28

## 2024-09-28 RX ADMIN — DILTIAZEM HYDROCHLORIDE 30 MG: 60 TABLET, FILM COATED ORAL at 11:41

## 2024-09-28 NOTE — PROGRESS NOTES
09/28/24 1418   Resting (Room Air)   SpO2 91      During Walk (On O2)   SpO2 89      After Walk   SpO2 92      Does the Patient Qualify for Home O2 No   Does the Patient Need Portable Oxygen Tanks No

## 2024-09-28 NOTE — CARE COORDINATION
CM made aware patient will need New Oxygen set up for night time. CM called Tylor at [phone] and left a voicemail message requesting a return call.     CM to follow up.

## 2024-09-28 NOTE — PLAN OF CARE
Problem: Discharge Planning  Goal: Discharge to home or other facility with appropriate resources  Outcome: Progressing     Problem: Skin/Tissue Integrity  Goal: Absence of new skin breakdown  Description: 1.  Monitor for areas of redness and/or skin breakdown  2.  Assess vascular access sites hourly  3.  Every 4-6 hours minimum:  Change oxygen saturation probe site  4.  Every 4-6 hours:  If on nasal continuous positive airway pressure, respiratory therapy assess nares and determine need for appliance change or resting period.  Outcome: Progressing     Problem: Safety - Adult  Goal: Free from fall injury  Outcome: Progressing     Problem: ABCDS Injury Assessment  Goal: Absence of physical injury  Outcome: Progressing     Problem: Pain  Goal: Verbalizes/displays adequate comfort level or baseline comfort level  Outcome: Progressing     Problem: Respiratory - Adult  Goal: Achieves optimal ventilation and oxygenation  Outcome: Progressing

## 2024-09-28 NOTE — DISCHARGE SUMMARY
Hospitalist Discharge Summary   Admit Date:  2024  3:37 AM   DC Note date: 2024  Name:  Jeanne Nieto   Age:  84 y.o.  Sex:  female  :  1940   MRN:  [id number]   Room:  [facility]  PCP:  Di Dumont MD    Presenting Complaint: Shortness of Breath     Initial Admission Diagnosis: Severe persistent asthma with exacerbation [J45.51]  Acute on chronic respiratory failure with hypercapnia [J96.22]  Acute respiratory failure with hypoxia and hypercapnia [J96.01, J96.02]     Problem List for this Hospitalization (present on admission):    Principal Problem:    Acute on chronic respiratory failure with hypercapnia  Active Problems:    Aortic stenosis    Asthma exacerbation    Obesity    SVT (supraventricular tachycardia) (HCC)    RY (obstructive sleep apnea)    Hiatal hernia    Pulmonary hypertension (HCC)  Resolved Problems:    * No resolved hospital problems. *      Hospital Course:  84 y.o. female with history of persistent severe asthma, mitral valve stenosis, aortic valve stenosis, GERD, hypertension, paroxysmal A-fib, on Coumadin and hypothyroidism admitted to ICU on  for acute hypoxic/hypercapnic respiratory failure, requiring BiPAP support. CT PE negative for acute pulmonary embolism. Patient started on IV steroids and nebs treatment. Also given IV Lasix for concern for fluid overload. Cardiology consulted. BiPAP weaned off to nasal cannula.   Patient presented with acute on chronic hypercapnic respiratory failure with persistent severe asthma.  Patient will undergo walk test prior to discharge.  Patient will finish course of doxycycline and prednisone taper.  Patient encouraged to follow-up with pulmonology in outpatient setting for further management of her asthma.  For patient's acute diastolic heart failure, hypertension, proximal atrial fibrillation, episode of SVT cardiology was consulted and followed during hospitalization.  Patient will continue Eliquis.  Patient transition from low-dose      Coronavirus OC43 by PCR NOT DETECTED        SARS-CoV-2, PCR NOT DETECTED        Human Metapneumovirus by PCR NOT DETECTED        Rhinovirus Enterovirus PCR NOT DETECTED        Influenza A by PCR NOT DETECTED        Influenza B PCR NOT DETECTED        Parainfluenza 1 PCR NOT DETECTED        Parainfluenza 2 PCR NOT DETECTED        Parainfluenza 3 PCR NOT DETECTED        Parainfluenza 4 PCR NOT DETECTED        Respiratory Syncytial Virus by PCR NOT DETECTED        Bordetella parapertussis by PCR NOT DETECTED        Bordetella pertussis by PCR NOT DETECTED        Chlamydophila Pneumonia PCR NOT DETECTED        Mycoplasma pneumo by PCR NOT DETECTED               All Labs from Last 24 Hrs:  No results found for this or any previous visit (from the past 24 hour(s)).    No results for input(s): \"COVID19\" in the last 72 hours.    Recent Vital Data:  Patient Vitals for the past 24 hrs:   Temp Pulse Resp BP SpO2   09/28/24 1141 -- 56 -- 137/73 --   09/28/24 0900 -- -- -- -- 100 %   09/28/24 0715 98 °F (36.7 °C) 51 22 (!) 141/64 96 %   09/27/24 2012 -- 65 18 -- 94 %   09/27/24 1927 98.4 °F (36.9 °C) 61 20 137/69 93 %   09/27/24 1838 -- 58 -- 123/66 --   09/27/24 1758 -- 75 -- -- --       Oxygen Therapy  SpO2: 100 %  Pulse Oximetry Type: Continuous  Pulse via Oximetry: 80 beats per minute  Pulse Oximeter Device Mode: Continuous  Pulse Oximeter Device Location: Finger  O2 Device: None (Room air)  Oximetry Probe Site Changed: Yes  Skin Assessment: Clean, dry, & intact  Skin Protection for O2 Device: N/A  FiO2 : 28 %  O2 Flow Rate (L/min): 3 L/min  Blood Gas  Performed?: Yes  Edilson's Test #1: Collateral flow confirmed  Site #1: Right Radial  Site Prepped #1: Yes  Number of Attempts #1: 1  Pressure Held #1: Yes  Complications #1: None  Post-procedure #1: Standard  Specimen Status #1: Point of care  How Tolerated?: Tolerated well    Estimated body mass index is 41.51 kg/m² as calculated from the following:    Height as of this

## 2024-09-28 NOTE — PROGRESS NOTES
Pt given discharge AVS paperwork. All information reviewed with pt who verbalizes understanding. All questions answered. PIV and telemetry removed. Pending taxi to  for transport home.

## 2024-09-28 NOTE — CARE COORDINATION
Patient has discharge orders to go home today. Patient is aware and in agreement with this discharge.     CM spoke to Tylor at [facility] who stated he can start patient's overnight Oxygen today and give patient her supplies today. Patient stated that she and her spouse can  supplies at [facility] today. CM gave patient [facility]'s address and Tylor's phone number.     CM sent Oximetry report to Tylor at fax number: [phone].     CM arranged Uber for patient as requested.     No CM needs voiced or noted at this time.     ASSESSMENT NOTE    Attending Physician: Ellis Abbasi MD  Admit Problem: Severe persistent asthma with exacerbation [J45.51]  Acute on chronic respiratory failure with hypercapnia [J96.22]  Acute respiratory failure with hypoxia and hypercapnia [J96.01, J96.02]  Date/Time of Admission: 9/24/2024  3:37 AM  Problem List:  Patient Active Problem List   Diagnosis    Class 2 severe obesity due to excess calories with serious comorbidity and body mass index (BMI) of 35.0 to 35.9 in adult    RLS (restless legs syndrome)    Osteoporosis    Primary osteoarthritis involving multiple joints    Iron deficiency anemia    Hypothyroidism (acquired)    Hypertension, essential    GERD (gastroesophageal reflux disease)    Recurrent major depressive disorder, in full remission (HCC)    Colon polyps    Chronic erosive gastritis    Aortic stenosis    Severe persistent asthma with acute exacerbation    Elevated brain natriuretic peptide (BNP) level    Acute lactic acidosis    Acute respiratory failure with hypoxia    Right atrial mass    Moderate mitral valve stenosis    Atrial fibrillation with rapid ventricular response (HCC)    Asthma exacerbation    Obesity    Mitral stenosis    Atrial thrombosis    Respiratory failure (HCC)    SVT (supraventricular tachycardia) (HCC)    Moderate persistent asthma with exacerbation    Secondary hypercoagulable state (HCC)    Acute on chronic respiratory failure

## 2024-10-01 ENCOUNTER — TELEPHONE (OUTPATIENT)
Dept: PRIMARY CARE CLINIC | Facility: CLINIC | Age: 84
End: 2024-10-01

## 2024-10-01 NOTE — TELEPHONE ENCOUNTER
Care Transitions Initial Follow Up Call    Outreach made within 2 business days of discharge: Yes    Patient: Jeanne Nieto Patient : 1940   MRN: [id number]  Reason for Admission: 24  Discharge Date: 24       Spoke with: JAJAM to call PCP to schedule hospital f/u appt.    Discharge department/facility: [facility]    Scheduled appointment with PCP within 7-14 days    Follow Up  Future Appointments   Date Time Provider Department Center   10/8/2024 10:30 AM [facility] ECHO 21 DG [facility] AMB   10/22/2024 10:30 AM Yariel Lopez,  [facility] AMB       SHAYAN LANDIN LPN

## 2024-10-02 NOTE — TELEPHONE ENCOUNTER
Care Transitions Initial Follow Up Call    Outreach made within 2 business days of discharge: Yes    Patient: Jeanne Nieto Patient : 1940   MRN: [id number]  Reason for Admission: 24  Discharge Date: 24       Spoke with: OLEG to call PCP office     Discharge department/facility: [facility]    Scheduled appointment with PCP within 7-14 days    Follow Up  Future Appointments   Date Time Provider Department Center   10/8/2024 10:30 AM [facility] ECHO 21 DG [facility] AMB   10/22/2024 10:30 AM Yariel Lopez,  [facility] AMB       SHAYAN LANDIN LPN

## 2024-10-04 ENCOUNTER — TELEPHONE (OUTPATIENT)
Age: 84
End: 2024-10-04

## 2024-10-04 NOTE — TELEPHONE ENCOUNTER
Pt calling wanting to know if she needs to come in for a echo cause she release from the hospital and have echos in the last week. She want to know do she need to still come in for her appt.

## 2024-10-04 NOTE — TELEPHONE ENCOUNTER
Spoke with pt regarding echo appointment.  Informed pt of physician's response and canceled her echo. Moved up pt's follow-up per pt's request.

## 2024-10-15 ENCOUNTER — OFFICE VISIT (OUTPATIENT)
Age: 84
End: 2024-10-15
Payer: MEDICARE

## 2024-10-15 ENCOUNTER — TELEPHONE (OUTPATIENT)
Age: 84
End: 2024-10-15

## 2024-10-15 VITALS
HEIGHT: 60 IN | WEIGHT: 210.6 LBS | DIASTOLIC BLOOD PRESSURE: 62 MMHG | HEART RATE: 69 BPM | SYSTOLIC BLOOD PRESSURE: 120 MMHG | BODY MASS INDEX: 41.35 KG/M2

## 2024-10-15 DIAGNOSIS — I10 PRIMARY HYPERTENSION: Chronic | ICD-10-CM

## 2024-10-15 DIAGNOSIS — R06.09 DOE (DYSPNEA ON EXERTION): Primary | ICD-10-CM

## 2024-10-15 DIAGNOSIS — I48.0 PAROXYSMAL ATRIAL FIBRILLATION (HCC): Chronic | ICD-10-CM

## 2024-10-15 DIAGNOSIS — I35.0 NONRHEUMATIC AORTIC VALVE STENOSIS: Chronic | ICD-10-CM

## 2024-10-15 DIAGNOSIS — I34.2 NONRHEUMATIC MITRAL VALVE STENOSIS: Chronic | ICD-10-CM

## 2024-10-15 PROCEDURE — G8417 CALC BMI ABV UP PARAM F/U: HCPCS | Performed by: INTERNAL MEDICINE

## 2024-10-15 PROCEDURE — 3074F SYST BP LT 130 MM HG: CPT | Performed by: INTERNAL MEDICINE

## 2024-10-15 PROCEDURE — 3078F DIAST BP <80 MM HG: CPT | Performed by: INTERNAL MEDICINE

## 2024-10-15 PROCEDURE — 1036F TOBACCO NON-USER: CPT | Performed by: INTERNAL MEDICINE

## 2024-10-15 PROCEDURE — G8427 DOCREV CUR MEDS BY ELIG CLIN: HCPCS | Performed by: INTERNAL MEDICINE

## 2024-10-15 PROCEDURE — 1111F DSCHRG MED/CURRENT MED MERGE: CPT | Performed by: INTERNAL MEDICINE

## 2024-10-15 PROCEDURE — 1123F ACP DISCUSS/DSCN MKR DOCD: CPT | Performed by: INTERNAL MEDICINE

## 2024-10-15 PROCEDURE — 1090F PRES/ABSN URINE INCON ASSESS: CPT | Performed by: INTERNAL MEDICINE

## 2024-10-15 PROCEDURE — G8484 FLU IMMUNIZE NO ADMIN: HCPCS | Performed by: INTERNAL MEDICINE

## 2024-10-15 PROCEDURE — 99205 OFFICE O/P NEW HI 60 MIN: CPT | Performed by: INTERNAL MEDICINE

## 2024-10-15 PROCEDURE — G8400 PT W/DXA NO RESULTS DOC: HCPCS | Performed by: INTERNAL MEDICINE

## 2024-10-15 RX ORDER — DAPAGLIFLOZIN 10 MG/1
10 TABLET, FILM COATED ORAL EVERY MORNING
Qty: 90 TABLET | Refills: 3
Start: 2024-10-15

## 2024-10-15 RX ORDER — SPIRONOLACTONE 25 MG/1
25 TABLET ORAL DAILY
Qty: 30 TABLET | Refills: 11 | Status: SHIPPED | OUTPATIENT
Start: 2024-10-15

## 2024-10-15 RX ORDER — METOPROLOL SUCCINATE 25 MG/1
25 TABLET, EXTENDED RELEASE ORAL DAILY
Qty: 30 TABLET | Refills: 11 | Status: SHIPPED | OUTPATIENT
Start: 2024-10-15

## 2024-10-15 RX ORDER — WARFARIN SODIUM 5 MG/1
5 TABLET ORAL DAILY
Qty: 30 TABLET | Refills: 11 | Status: SHIPPED | OUTPATIENT
Start: 2024-10-15

## 2024-10-15 ASSESSMENT — ENCOUNTER SYMPTOMS: SHORTNESS OF BREATH: 1

## 2024-10-15 NOTE — PROGRESS NOTES
[facility] CARDIOLOGY  [address] [place] [address], [address]  [place] [address]  PHONE: [phone]      10/15/24    NAME:  Jeanne Nieto  : 1940  MRN: [id number]         SUBJECTIVE:   Jeanne Nieto is a 84 y.o. female seen for a follow up visit regarding the following:     Chief Complaint   Patient presents with    Hypertension    Atrial Fibrillation            HPI:  Follow up  Hypertension and Atrial Fibrillation   .      84 y.o. female with PMH HTN, HLD, obesity, paroxysmal atrial fibrillation, mitral stenosis, aortic stenosis, asthma presenting to establish care with cardiology after recent admission for acute hypoxic/hypercapnic respiratory failure requiring BiPAP. Since discharge, patient reports feeling better overall. She has been wearing oxygen at nighttime. She continues to have dyspnea when she exerts herself, but she does not feel like this is from asthma. She has a mild aching sensation in the center of her chest as well.         Cardiac Medications       Potassium Sparing Diuretics       spironolactone (ALDACTONE) 25 MG tablet Take 1 tablet by mouth daily       Beta Blockers Cardio-Selective       metoprolol succinate (TOPROL XL) 25 MG extended release tablet Take 1 tablet by mouth daily       Loop Diuretics       furosemide (LASIX) 40 MG tablet Take 1 tablet by mouth daily       HMG CoA Reductase Inhibitors       atorvastatin (LIPITOR) 40 MG tablet Take 1 tablet by mouth daily       Coumarin Anticoagulants       warfarin (COUMADIN) 5 MG tablet Take 1 tablet by mouth daily          Diabetic Medications       Sodium-Glucose Co-Transporter 2 (SGLT2) Inhibitors       dapagliflozin (FARXIGA) 10 MG tablet Take 1 tablet by mouth every morning                Past Medical History, Past Surgical History, Family history, Social History, and Medications were all reviewed with the patient today and updated as necessary.     Prior to Admission medications    Medication Sig Start Date End Date Taking?

## 2024-10-15 NOTE — TELEPHONE ENCOUNTER
Patient saw Dr. Lopez, and she was on Eliquis and needs to be switched back to Warfarin.     Please call to set patient back up in Coumadin clinic

## 2024-10-16 NOTE — TELEPHONE ENCOUNTER
Called patient and she informed me see did not get her Warfarin filled and will start it tomorrow.    Appointment set for INR on Monday at 930 in the [place] office

## 2024-10-18 ENCOUNTER — TELEPHONE (OUTPATIENT)
Age: 84
End: 2024-10-18

## 2024-10-18 DIAGNOSIS — Z79.01 LONG TERM CURRENT USE OF ANTICOAGULANT: Primary | ICD-10-CM

## 2024-10-18 DIAGNOSIS — I48.0 PAROXYSMAL ATRIAL FIBRILLATION (HCC): ICD-10-CM

## 2024-10-18 NOTE — TELEPHONE ENCOUNTER
Spoke with pharmacy tech at [facility] regarding Warfarin refill request. Pharmacy tech stated they received refill request.

## 2024-10-18 NOTE — TELEPHONE ENCOUNTER
poke with pt regarding Warfarin refill request. Informed pt Rx is ready for pickup at pharmacy. Pt voiced understanding.

## 2024-10-18 NOTE — TELEPHONE ENCOUNTER
Pt called in and stated that she went to the pharmacy and they didn't receive the prescription request for warfarin (COUMADIN) 5 MG tablet [[id number]]  I verified the Pharmacy and she stated it was the correct one.   [id number] - [place], [place] - [address] - P [phone] - F [phone] [address] [facility] [address]  Phone: [phone]  Fax: [phone]

## 2024-10-21 PROBLEM — Z79.01 LONG TERM CURRENT USE OF ANTICOAGULANT: Status: ACTIVE | Noted: 2024-10-21

## 2024-10-22 ENCOUNTER — ANTI-COAG VISIT (OUTPATIENT)
Age: 84
End: 2024-10-22

## 2024-10-22 DIAGNOSIS — Z79.01 LONG TERM CURRENT USE OF ANTICOAGULANT: ICD-10-CM

## 2024-10-22 DIAGNOSIS — I48.0 PAROXYSMAL ATRIAL FIBRILLATION (HCC): Primary | ICD-10-CM

## 2024-10-22 LAB
POC INR: 1.7
PROTHROMBIN TIME, POC: ABNORMAL

## 2024-10-22 NOTE — PROGRESS NOTES
First INR check since starting warfarin on 10/19/2024. Warfarin tablet strength and weekly dosing schedule confirmed today. Maintenance plan reduced to 2.5 mg daily (see Anticoag Dosing Calendar). INR to be rechecked in four days.

## 2024-10-22 NOTE — PATIENT INSTRUCTIONS
Reminder: Please contact the Coumadin Clinic at [phone] when you have medication changes. Examples, new medications, antibiotics, discontinued medications, new supplements, missed doses of warfarin or if you took extra doses of warfarin.  This also includes OTC medications. Notifying us helps reduce the possibility of high and low INR's. In addition, if warfarin needs to be held for any procedures, please have surgeon or physician's office contact us before holding anticoagulant. Thanks, [facility] Cardiology Coumadin [facility].       Please go to the Emergency Department if you experience:  - Extreme shortness of breath or chest pain  - Red, warm, painful, swollen limb  - Numbness or tingling on one side of the body  - Slurred speech or major vision change  - Extreme headache

## 2024-10-29 ENCOUNTER — ANTI-COAG VISIT (OUTPATIENT)
Age: 84
End: 2024-10-29

## 2024-10-29 DIAGNOSIS — I48.0 PAROXYSMAL ATRIAL FIBRILLATION (HCC): Primary | ICD-10-CM

## 2024-10-29 DIAGNOSIS — Z79.01 LONG TERM CURRENT USE OF ANTICOAGULANT: ICD-10-CM

## 2024-10-29 LAB
POC INR: 1.8
PROTHROMBIN TIME, POC: NORMAL

## 2024-10-29 NOTE — PATIENT INSTRUCTIONS
Reminder: Please contact the Coumadin Clinic at [phone] when you have medication changes. Examples, new medications, antibiotics, discontinued medications, new supplements, missed doses of warfarin or if you took extra doses of warfarin.  This also includes OTC medications. Notifying us helps reduce the possibility of high and low INR's. In addition, if warfarin needs to be held for any procedures, please have surgeon or physician's office contact us before holding anticoagulant. Thanks, [email or web address] Cardiology Coumadin Clinic.       Please go to the Emergency Department if you experience:  - Extreme shortness of breath or chest pain  - Red, warm, painful, swollen limb  - Numbness or tingling on one side of the body  - Slurred speech or major vision change  - Extreme headache

## 2024-10-30 ENCOUNTER — OFFICE VISIT (OUTPATIENT)
Dept: PRIMARY CARE CLINIC | Facility: CLINIC | Age: 84
End: 2024-10-30

## 2024-10-30 VITALS
BODY MASS INDEX: 40.84 KG/M2 | HEART RATE: 62 BPM | WEIGHT: 208 LBS | TEMPERATURE: 97.7 F | DIASTOLIC BLOOD PRESSURE: 62 MMHG | SYSTOLIC BLOOD PRESSURE: 118 MMHG | OXYGEN SATURATION: 98 % | HEIGHT: 60 IN

## 2024-10-30 DIAGNOSIS — Z79.01 LONG TERM CURRENT USE OF ANTICOAGULANT: ICD-10-CM

## 2024-10-30 DIAGNOSIS — J45.51 SEVERE PERSISTENT ASTHMA WITH ACUTE EXACERBATION: ICD-10-CM

## 2024-10-30 DIAGNOSIS — Z91.81 AT RISK FOR FALLS: ICD-10-CM

## 2024-10-30 DIAGNOSIS — E66.813 CLASS 3 SEVERE OBESITY DUE TO EXCESS CALORIES WITH SERIOUS COMORBIDITY AND BODY MASS INDEX (BMI) OF 40.0 TO 44.9 IN ADULT: Primary | ICD-10-CM

## 2024-10-30 DIAGNOSIS — E03.9 HYPOTHYROIDISM (ACQUIRED): ICD-10-CM

## 2024-10-30 DIAGNOSIS — I10 PRIMARY HYPERTENSION: ICD-10-CM

## 2024-10-30 DIAGNOSIS — G25.81 RLS (RESTLESS LEGS SYNDROME): ICD-10-CM

## 2024-10-30 DIAGNOSIS — I48.91 ATRIAL FIBRILLATION WITH RAPID VENTRICULAR RESPONSE (HCC): ICD-10-CM

## 2024-10-30 DIAGNOSIS — E78.5 HYPERLIPIDEMIA, UNSPECIFIED HYPERLIPIDEMIA TYPE: ICD-10-CM

## 2024-10-30 DIAGNOSIS — E83.10 DISORDER OF IRON METABOLISM, UNSPECIFIED: ICD-10-CM

## 2024-10-30 DIAGNOSIS — Z23 FLU VACCINE NEED: ICD-10-CM

## 2024-10-30 DIAGNOSIS — E66.01 CLASS 3 SEVERE OBESITY DUE TO EXCESS CALORIES WITH SERIOUS COMORBIDITY AND BODY MASS INDEX (BMI) OF 40.0 TO 44.9 IN ADULT: Primary | ICD-10-CM

## 2024-10-30 DIAGNOSIS — D50.9 IRON DEFICIENCY ANEMIA, UNSPECIFIED IRON DEFICIENCY ANEMIA TYPE: Chronic | ICD-10-CM

## 2024-10-30 PROBLEM — R31.9 HEMATURIA: Status: RESOLVED | Noted: 2018-10-29 | Resolved: 2024-10-30

## 2024-10-30 PROBLEM — B02.9 HERPES ZOSTER: Status: RESOLVED | Noted: 2018-10-29 | Resolved: 2024-10-30

## 2024-10-30 PROBLEM — Z96.1 PSEUDOPHAKIA, BOTH EYES: Status: RESOLVED | Noted: 2022-06-19 | Resolved: 2024-10-30

## 2024-10-30 PROBLEM — R53.81 DEBILITY: Status: RESOLVED | Noted: 2018-10-29 | Resolved: 2024-10-30

## 2024-10-30 PROBLEM — S32.409A CLOSED FRACTURE OF ACETABULUM (HCC): Status: RESOLVED | Noted: 2018-10-29 | Resolved: 2024-10-30

## 2024-10-30 PROBLEM — R34 OLIGURIA: Status: RESOLVED | Noted: 2018-10-29 | Resolved: 2024-10-30

## 2024-10-30 PROBLEM — H26.493 POSTERIOR CAPSULE OPACIFICATION, BILATERAL: Status: RESOLVED | Noted: 2022-06-19 | Resolved: 2024-10-30

## 2024-10-30 PROBLEM — G47.00 INSOMNIA: Status: RESOLVED | Noted: 2024-01-26 | Resolved: 2024-10-30

## 2024-10-30 PROBLEM — M25.519 SHOULDER PAIN: Status: RESOLVED | Noted: 2020-09-15 | Resolved: 2024-10-30

## 2024-10-30 PROBLEM — S22.009A FRACTURE OF THORACIC SPINE (HCC): Status: RESOLVED | Noted: 2018-10-28 | Resolved: 2024-10-30

## 2024-10-30 PROBLEM — M19.019 OSTEOARTHRITIS OF SHOULDER: Status: RESOLVED | Noted: 2020-03-20 | Resolved: 2024-10-30

## 2024-10-30 PROBLEM — S00.93XA CONTUSION OF HEAD: Status: RESOLVED | Noted: 2018-10-29 | Resolved: 2024-10-30

## 2024-10-30 PROBLEM — M25.60 JOINT STIFFNESS: Status: RESOLVED | Noted: 2024-10-30 | Resolved: 2024-10-30

## 2024-10-30 PROBLEM — R73.03 PREDIABETES: Status: RESOLVED | Noted: 2023-07-14 | Resolved: 2024-10-30

## 2024-10-30 PROBLEM — S32.000A WEDGE FRACTURE OF LUMBAR VERTEBRA (HCC): Status: RESOLVED | Noted: 2018-10-28 | Resolved: 2024-10-30

## 2024-10-30 PROBLEM — H52.221 REGULAR ASTIGMATISM OF RIGHT EYE: Status: RESOLVED | Noted: 2022-06-19 | Resolved: 2024-10-30

## 2024-10-30 PROBLEM — S32.10XA CLOSED FRACTURE OF SACRUM (HCC): Status: RESOLVED | Noted: 2018-10-28 | Resolved: 2024-10-30

## 2024-10-30 PROBLEM — S32.82XB: Status: RESOLVED | Noted: 2018-10-28 | Resolved: 2024-10-30

## 2024-10-30 PROBLEM — E78.2 MIXED HYPERLIPIDEMIA: Status: RESOLVED | Noted: 2023-04-03 | Resolved: 2024-10-30

## 2024-10-30 PROBLEM — S22.41XA MULTIPLE FRACTURES OF RIBS, RIGHT SIDE, INITIAL ENCOUNTER FOR CLOSED FRACTURE: Status: RESOLVED | Noted: 2018-10-28 | Resolved: 2024-10-30

## 2024-10-30 RX ORDER — PREDNISONE 10 MG/1
10 TABLET ORAL PRN
Status: CANCELLED | OUTPATIENT
Start: 2024-10-30

## 2024-10-30 RX ORDER — PREDNISONE 10 MG/1
TABLET ORAL
Qty: 18 TABLET | Refills: 0 | Status: SHIPPED | OUTPATIENT
Start: 2024-10-30

## 2024-10-30 RX ORDER — GABAPENTIN 300 MG/1
300 CAPSULE ORAL DAILY
Qty: 90 CAPSULE | Refills: 1 | Status: SHIPPED | OUTPATIENT
Start: 2024-10-30 | End: 2025-04-28

## 2024-10-30 RX ORDER — TIRZEPATIDE 2.5 MG/.5ML
2.5 INJECTION, SOLUTION SUBCUTANEOUS
Qty: 2 ML | Refills: 0 | Status: SHIPPED | OUTPATIENT
Start: 2024-10-30

## 2024-10-30 ASSESSMENT — PATIENT HEALTH QUESTIONNAIRE - PHQ9
7. TROUBLE CONCENTRATING ON THINGS, SUCH AS READING THE NEWSPAPER OR WATCHING TELEVISION: NOT AT ALL
6. FEELING BAD ABOUT YOURSELF - OR THAT YOU ARE A FAILURE OR HAVE LET YOURSELF OR YOUR FAMILY DOWN: NOT AT ALL
SUM OF ALL RESPONSES TO PHQ9 QUESTIONS 1 & 2: 0
SUM OF ALL RESPONSES TO PHQ QUESTIONS 1-9: 0
8. MOVING OR SPEAKING SO SLOWLY THAT OTHER PEOPLE COULD HAVE NOTICED. OR THE OPPOSITE, BEING SO FIGETY OR RESTLESS THAT YOU HAVE BEEN MOVING AROUND A LOT MORE THAN USUAL: NOT AT ALL
SUM OF ALL RESPONSES TO PHQ QUESTIONS 1-9: 0
1. LITTLE INTEREST OR PLEASURE IN DOING THINGS: NOT AT ALL
SUM OF ALL RESPONSES TO PHQ QUESTIONS 1-9: 0
SUM OF ALL RESPONSES TO PHQ QUESTIONS 1-9: 0
4. FEELING TIRED OR HAVING LITTLE ENERGY: NOT AT ALL
2. FEELING DOWN, DEPRESSED OR HOPELESS: NOT AT ALL
SUM OF ALL RESPONSES TO PHQ QUESTIONS 1-9: 0
5. POOR APPETITE OR OVEREATING: NOT AT ALL
SUM OF ALL RESPONSES TO PHQ QUESTIONS 1-9: 0
SUM OF ALL RESPONSES TO PHQ QUESTIONS 1-9: 0
1. LITTLE INTEREST OR PLEASURE IN DOING THINGS: NOT AT ALL
SUM OF ALL RESPONSES TO PHQ9 QUESTIONS 1 & 2: 0
9. THOUGHTS THAT YOU WOULD BE BETTER OFF DEAD, OR OF HURTING YOURSELF: NOT AT ALL
SUM OF ALL RESPONSES TO PHQ QUESTIONS 1-9: 0
2. FEELING DOWN, DEPRESSED OR HOPELESS: NOT AT ALL
3. TROUBLE FALLING OR STAYING ASLEEP: NOT AT ALL

## 2024-10-30 NOTE — PROGRESS NOTES
needed) 90 capsule 3    levalbuterol (XOPENEX) 0.63 MG/3ML nebulization Take 3 mLs by nebulization every 6 hours while awake (Patient taking differently: Take 3 mLs by nebulization every 6 hours as needed for Wheezing or Shortness of Breath) 1 each 0    albuterol sulfate HFA (VENTOLIN HFA) 108 (90 Base) MCG/ACT inhaler Inhale 2 puffs into the lungs every 4 hours as needed for Wheezing 3 each 3    Calcium Carbonate-Vitamin D (CALTRATE 600+D PO) Take 1 tablet by mouth daily      furosemide (LASIX) 40 MG tablet Take 1 tablet by mouth daily 30 tablet 0    NONFORMULARY Inhale 1-2 puffs into the lungs daily Indications: Seroflo (ciphaler). 72.5 mcg Salmeterol/500 mcg Fluticasone      Misc. Devices (CPAP MACHINE) MISC 1 each nightly (Patient not taking: Reported on 10/15/2024)      Respiratory Therapy Supplies (NEBULIZER) HOMER by Does not apply route       No current facility-administered medications for this visit.           Reviewed and updated this visit by provider:  Tobacco  Allergies  Meds  Problems  Med Hx  Surg Hx  Fam Hx            Review of Systems         Objective:     Vitals:    10/30/24 1311   BP: 118/62   Site: Left Upper Arm   Position: Sitting   Cuff Size: Large Adult   Pulse: 62   Temp: 97.7 °F (36.5 °C)   TempSrc: Temporal   SpO2: 98%   Weight: 94.3 kg (208 lb)   Height: 1.524 m (5')        Physical Exam  Constitutional:       Appearance: Normal appearance.   HENT:      Head: Normocephalic.      Right Ear: Tympanic membrane, ear canal and external ear normal.      Left Ear: Tympanic membrane, ear canal and external ear normal.      Nose: Nose normal.      Mouth/Throat:      Mouth: Mucous membranes are moist.   Eyes:      Conjunctiva/sclera: Conjunctivae normal.   Cardiovascular:      Rate and Rhythm: Normal rate and regular rhythm.      Heart sounds: Murmur heard.   Pulmonary:      Effort: Pulmonary effort is normal.      Breath sounds: Normal breath sounds. No wheezing or rhonchi.   Abdominal:

## 2024-10-31 DIAGNOSIS — E66.813 CLASS 3 SEVERE OBESITY WITH SERIOUS COMORBIDITY AND BODY MASS INDEX (BMI) OF 40.0 TO 44.9 IN ADULT, UNSPECIFIED OBESITY TYPE: Primary | ICD-10-CM

## 2024-10-31 DIAGNOSIS — E66.01 CLASS 3 SEVERE OBESITY WITH SERIOUS COMORBIDITY AND BODY MASS INDEX (BMI) OF 40.0 TO 44.9 IN ADULT, UNSPECIFIED OBESITY TYPE: Primary | ICD-10-CM

## 2024-10-31 RX ORDER — TIRZEPATIDE 2.5 MG/.5ML
2.5 INJECTION, SOLUTION SUBCUTANEOUS
Qty: 2 ML | Refills: 0 | Status: SHIPPED | OUTPATIENT
Start: 2024-10-31

## 2024-10-31 NOTE — PATIENT INSTRUCTIONS
[email or web address] and enter H967 to learn more about \"DASH Diet: Care Instructions.\"  Current as of: September 20, 2023  Content Version: 14.2  © 2024 [employer].   Care instructions adapted under license by [employer]. If you have questions about a medical condition or this instruction, always ask your healthcare professional. [employer] disclaims any warranty or liability for your use of this information.

## 2024-11-05 ENCOUNTER — ANTI-COAG VISIT (OUTPATIENT)
Age: 84
End: 2024-11-05

## 2024-11-05 DIAGNOSIS — E83.10 DISORDER OF IRON METABOLISM, UNSPECIFIED: ICD-10-CM

## 2024-11-05 DIAGNOSIS — I48.0 PAROXYSMAL ATRIAL FIBRILLATION (HCC): Primary | ICD-10-CM

## 2024-11-05 DIAGNOSIS — Z79.01 LONG TERM CURRENT USE OF ANTICOAGULANT: ICD-10-CM

## 2024-11-05 DIAGNOSIS — D50.9 IRON DEFICIENCY ANEMIA, UNSPECIFIED IRON DEFICIENCY ANEMIA TYPE: ICD-10-CM

## 2024-11-05 DIAGNOSIS — E03.9 HYPOTHYROIDISM (ACQUIRED): ICD-10-CM

## 2024-11-05 DIAGNOSIS — G25.81 RLS (RESTLESS LEGS SYNDROME): ICD-10-CM

## 2024-11-05 DIAGNOSIS — E78.5 HYPERLIPIDEMIA, UNSPECIFIED HYPERLIPIDEMIA TYPE: ICD-10-CM

## 2024-11-05 LAB
ALBUMIN SERPL-MCNC: 3.8 G/DL (ref 3.2–4.6)
ALBUMIN/GLOB SERPL: 1.6 (ref 1–1.9)
ALP SERPL-CCNC: 81 U/L (ref 35–104)
ALT SERPL-CCNC: 13 U/L (ref 8–45)
ANION GAP SERPL CALC-SCNC: 9 MMOL/L (ref 7–16)
AST SERPL-CCNC: 21 U/L (ref 15–37)
BASOPHILS # BLD: 0.1 K/UL (ref 0–0.2)
BASOPHILS NFR BLD: 1 % (ref 0–2)
BILIRUB SERPL-MCNC: 0.3 MG/DL (ref 0–1.2)
BUN SERPL-MCNC: 27 MG/DL (ref 8–23)
CALCIUM SERPL-MCNC: 10.3 MG/DL (ref 8.8–10.2)
CHLORIDE SERPL-SCNC: 102 MMOL/L (ref 98–107)
CHOLEST SERPL-MCNC: 165 MG/DL (ref 0–200)
CO2 SERPL-SCNC: 29 MMOL/L (ref 20–29)
CREAT SERPL-MCNC: 0.98 MG/DL (ref 0.6–1.1)
DIFFERENTIAL METHOD BLD: ABNORMAL
EOSINOPHIL # BLD: 0.3 K/UL (ref 0–0.8)
EOSINOPHIL NFR BLD: 5 % (ref 0.5–7.8)
ERYTHROCYTE [DISTWIDTH] IN BLOOD BY AUTOMATED COUNT: 15.2 % (ref 11.9–14.6)
EST. AVERAGE GLUCOSE BLD GHB EST-MCNC: 135 MG/DL
GLOBULIN SER CALC-MCNC: 2.4 G/DL (ref 2.3–3.5)
GLUCOSE SERPL-MCNC: 105 MG/DL (ref 70–99)
HBA1C MFR BLD: 6.3 % (ref 0–5.6)
HCT VFR BLD AUTO: 36.5 % (ref 35.8–46.3)
HDLC SERPL-MCNC: 82 MG/DL (ref 40–60)
HDLC SERPL: 2 (ref 0–5)
HGB BLD-MCNC: 10.8 G/DL (ref 11.7–15.4)
IMM GRANULOCYTES # BLD AUTO: 0 K/UL (ref 0–0.5)
IMM GRANULOCYTES NFR BLD AUTO: 1 % (ref 0–5)
LDLC SERPL CALC-MCNC: 68 MG/DL (ref 0–100)
LYMPHOCYTES # BLD: 1.2 K/UL (ref 0.5–4.6)
LYMPHOCYTES NFR BLD: 21 % (ref 13–44)
MCH RBC QN AUTO: 27 PG (ref 26.1–32.9)
MCHC RBC AUTO-ENTMCNC: 29.6 G/DL (ref 31.4–35)
MCV RBC AUTO: 91.3 FL (ref 82–102)
MONOCYTES # BLD: 0.6 K/UL (ref 0.1–1.3)
MONOCYTES NFR BLD: 11 % (ref 4–12)
NEUTS SEG # BLD: 3.5 K/UL (ref 1.7–8.2)
NEUTS SEG NFR BLD: 62 % (ref 43–78)
NRBC # BLD: 0 K/UL (ref 0–0.2)
PLATELET # BLD AUTO: 258 K/UL (ref 150–450)
PMV BLD AUTO: 10.2 FL (ref 9.4–12.3)
POC INR: 1.6
POTASSIUM SERPL-SCNC: 4.7 MMOL/L (ref 3.5–5.1)
PROT SERPL-MCNC: 6.2 G/DL (ref 6.3–8.2)
PROTHROMBIN TIME, POC: ABNORMAL
RBC # BLD AUTO: 4 M/UL (ref 4.05–5.2)
SODIUM SERPL-SCNC: 139 MMOL/L (ref 136–145)
TRIGL SERPL-MCNC: 78 MG/DL (ref 0–150)
TSH W FREE THYROID IF ABNORMAL: 0.79 UIU/ML (ref 0.27–4.2)
VIT B12 SERPL-MCNC: 424 PG/ML (ref 193–986)
VLDLC SERPL CALC-MCNC: 16 MG/DL (ref 6–23)
WBC # BLD AUTO: 5.7 K/UL (ref 4.3–11.1)

## 2024-11-05 NOTE — PROGRESS NOTES
Warfarin tablet strength and weekly dosing schedule confirmed today. Patient knows of no reason for subtherapeutic INR result. Maintenance plan increased by one day (2.5 mg), (see Anticoag Dosing Calendar). INR to be rechecked in one week.

## 2024-11-05 NOTE — PATIENT INSTRUCTIONS
Reminder: Please contact the Coumadin Clinic at [phone] when you have medication changes. Examples, new medications, antibiotics, discontinued medications, new supplements, missed doses of warfarin or if you took extra doses of warfarin.  This also includes OTC medications. Notifying us helps reduce the possibility of high and low INR's. In addition, if warfarin needs to be held for any procedures, please have surgeon or physician's office contact us before holding anticoagulant. Thanks, [facility] Cardiology Coumadin Clinic.       Please go to the Emergency Department if you experience:  - Extreme shortness of breath or chest pain  - Red, warm, painful, swollen limb  - Numbness or tingling on one side of the body  - Slurred speech or major vision change  - Extreme headache

## 2024-11-08 ENCOUNTER — OFFICE VISIT (OUTPATIENT)
Dept: PULMONOLOGY | Age: 84
End: 2024-11-08

## 2024-11-08 VITALS
SYSTOLIC BLOOD PRESSURE: 138 MMHG | HEIGHT: 60 IN | HEART RATE: 56 BPM | DIASTOLIC BLOOD PRESSURE: 82 MMHG | OXYGEN SATURATION: 95 % | BODY MASS INDEX: 40.84 KG/M2 | RESPIRATION RATE: 16 BRPM | WEIGHT: 208 LBS | TEMPERATURE: 97.2 F

## 2024-11-08 DIAGNOSIS — G47.34 NOCTURNAL HYPOXIA: ICD-10-CM

## 2024-11-08 DIAGNOSIS — K21.9 GASTROESOPHAGEAL REFLUX DISEASE, UNSPECIFIED WHETHER ESOPHAGITIS PRESENT: ICD-10-CM

## 2024-11-08 DIAGNOSIS — E66.01 SEVERE OBESITY (BMI 35.0-39.9) WITH COMORBIDITY: ICD-10-CM

## 2024-11-08 DIAGNOSIS — J45.50 SEVERE PERSISTENT ASTHMA WITHOUT COMPLICATION: Primary | ICD-10-CM

## 2024-11-08 RX ORDER — FLUTICASONE PROPIONATE AND SALMETEROL 500; 50 UG/1; UG/1
1 POWDER RESPIRATORY (INHALATION) EVERY 12 HOURS
Qty: 1 EACH | Refills: 11 | Status: SHIPPED | OUTPATIENT
Start: 2024-11-08

## 2024-11-08 RX ORDER — FUROSEMIDE 40 MG/1
40 TABLET ORAL DAILY PRN
Qty: 30 TABLET | Refills: 1 | Status: SHIPPED | OUTPATIENT
Start: 2024-11-08

## 2024-11-08 RX ORDER — ALBUTEROL SULFATE 90 UG/1
2 INHALANT RESPIRATORY (INHALATION) EVERY 4 HOURS PRN
Qty: 3 EACH | Refills: 3 | Status: SHIPPED | OUTPATIENT
Start: 2024-11-08

## 2024-11-08 NOTE — PROGRESS NOTES
spine.  Old healed fracture of the manubrium of the sternum.  Old compression fractures of L1, L2.  Bilateral total shoulder  arthroplasties.  Components appear well seated.  No dislocation.    Soft tissues:  Unremarkable.    Lymph nodes:  Unremarkable.  No enlarged lymph nodes.    Gallbladder and bile ducts:  Prior cholecystectomy.    Pancreas:  Calcified 3.5 cm lesion in the tail of the pancreas is  likely related to old partially calcified pancreatic pseudocyst, no  change.  Pancreatic neoplasm is not excluded but felt to be much less  likely.    Impression  1.  No acute pulmonary embolism.  2.  No aneurysm of the thoracic aorta.  3.  No consolidation of pneumonia.  4.  The main pulmonary artery is distended. A dilated pulmonary artery  may be due to pulmonary arterial hypertension, chronic pulmonary  embolus or other pulmonary diseases.  5.  Large hiatal hernia.  6.  Calcified 3.5 cm lesion in the tail of the pancreas is likely  related to old partially calcified pancreatic pseudocyst, no change.  Pancreatic neoplasm is not excluded but felt to be much less likely.      Automatic exposure control was used as a dose lowering technique.    Radiation Dose: CTDI is 37.98 mGy. DLP is 749.61 mGy-cm.    Contrast Type: iopamidol (ISOVUE-370) 76 . Contrast Volume:  75 mL    Report signed on 09/24/2024 (07:08 Eastern Time)  Signed by: Kim Alvarado M.D.  Reading Location: [facility]    Nuclear Medicine: No results found for this or any previous visit from the past 3650 days.      PFTs:   Date   12/6/23      FVC    1.31-63%      FEV1    0.72-47%      FEV1/FVC    55%      FEF 25-75%    0.34-30%      Bronchodilator Response          TLC          RV          DLCO              FeNO: 6/21/23--Fractional exhaled nitric oxide testing was performed with level of 57 ppb.  This is indicative of high likelihood of eosinophilic inflammation/asthma.     12/2023-Fractional exhaled nitric oxide testing was performed with level of 23 ppb.  This

## 2024-11-12 ENCOUNTER — ANTI-COAG VISIT (OUTPATIENT)
Age: 84
End: 2024-11-12
Payer: MEDICARE

## 2024-11-12 ENCOUNTER — OFFICE VISIT (OUTPATIENT)
Age: 84
End: 2024-11-12

## 2024-11-12 ENCOUNTER — HOSPITAL ENCOUNTER (OUTPATIENT)
Dept: PHYSICAL THERAPY | Age: 84
Setting detail: RECURRING SERIES
Discharge: HOME | End: 2024-11-15
Payer: MEDICARE

## 2024-11-12 VITALS
DIASTOLIC BLOOD PRESSURE: 62 MMHG | BODY MASS INDEX: 40.97 KG/M2 | HEIGHT: 60 IN | WEIGHT: 208.7 LBS | HEART RATE: 57 BPM | SYSTOLIC BLOOD PRESSURE: 130 MMHG

## 2024-11-12 DIAGNOSIS — I08.0 MITRAL VALVE STENOSIS AND AORTIC VALVE STENOSIS: Chronic | ICD-10-CM

## 2024-11-12 DIAGNOSIS — R53.81 DEBILITY: Primary | ICD-10-CM

## 2024-11-12 DIAGNOSIS — R06.09 DOE (DYSPNEA ON EXERTION): Primary | ICD-10-CM

## 2024-11-12 DIAGNOSIS — I48.0 PAROXYSMAL ATRIAL FIBRILLATION (HCC): Primary | ICD-10-CM

## 2024-11-12 DIAGNOSIS — I48.0 PAROXYSMAL ATRIAL FIBRILLATION (HCC): Chronic | ICD-10-CM

## 2024-11-12 DIAGNOSIS — Z79.01 LONG TERM CURRENT USE OF ANTICOAGULANT: ICD-10-CM

## 2024-11-12 DIAGNOSIS — I10 PRIMARY HYPERTENSION: Chronic | ICD-10-CM

## 2024-11-12 DIAGNOSIS — R26.89 BALANCE DISORDER: ICD-10-CM

## 2024-11-12 LAB
POC INR: 1.8
PROTHROMBIN TIME, POC: ABNORMAL

## 2024-11-12 PROCEDURE — 97161 PT EVAL LOW COMPLEX 20 MIN: CPT

## 2024-11-12 PROCEDURE — 97110 THERAPEUTIC EXERCISES: CPT

## 2024-11-12 PROCEDURE — 85610 PROTHROMBIN TIME: CPT | Performed by: INTERNAL MEDICINE

## 2024-11-12 NOTE — PATIENT INSTRUCTIONS
Reminder: Please contact the Coumadin Clinic at [phone] when you have medication changes. Examples, new medications, antibiotics, discontinued medications, new supplements, missed doses of warfarin or if you took extra doses of warfarin.  This also includes OTC medications. Notifying us helps reduce the possibility of high and low INR's. In addition, if warfarin needs to be held for any procedures, please have surgeon or physician's office contact us before holding anticoagulant. Thanks, [facility] Coumadin [facility].       Please go to the Emergency Department if you experience:  - Extreme shortness of breath or chest pain  - Red, warm, painful, swollen limb  - Numbness or tingling on one side of the body  - Slurred speech or major vision change  - Extreme headache

## 2024-11-12 NOTE — PROGRESS NOTES
Warfarin tablet strength and weekly dosing schedule confirmed today. Patient knows of no reason for subtherapeutic INR result. Patient has been taking warfarin in the AM, which we were not aware. She has already taken today's dose, which should have been 5 mg, patient took only 2.5 mg. Patient advised to switch warfarin to night/evening. She will take an additional 2.5 mg tonight then start taking warfarin at night/evening. Recheck INR in one week.

## 2024-11-12 NOTE — PROGRESS NOTES
[facility] CARDIOLOGY  [address] [place] [address], [address]  [place] [address]  PHONE: [phone]      24    NAME:  Jeanne Nieto  : 1940  MRN: [id number]         SUBJECTIVE:   Jeanne Nieto is a 84 y.o. female seen for a follow up visit regarding the following:     Chief Complaint   Patient presents with    Hypertension            HPI:  Follow up  Hypertension   .      84 y.o. female with PMH HTN, HLD, obesity, paroxysmal atrial fibrillation, mitral stenosis, aortic stenosis, asthma presenting for routine follow up. She continues to have SHEFFIELD. No significant improvements since her last appointment. No chest pain. No other acute complaints.        Cardiac Medications       Potassium Sparing Diuretics       spironolactone (ALDACTONE) 25 MG tablet Take 1 tablet by mouth daily       Beta Blockers Cardio-Selective       metoprolol succinate (TOPROL XL) 25 MG extended release tablet Take 1 tablet by mouth daily       Loop Diuretics       furosemide (LASIX) 40 MG tablet Take 1 tablet by mouth daily as needed (shortness of breath or swelling)       HMG CoA Reductase Inhibitors       atorvastatin (LIPITOR) 40 MG tablet Take 1 tablet by mouth daily       Coumarin Anticoagulants       warfarin (COUMADIN) 5 MG tablet Take 1 tablet by mouth daily          Diabetic Medications       Incretin Mimetic Agents       Tirzepatide (MOUNJARO) 2.5 MG/0.5ML SOAJ Inject 2.5 mg into the skin every 7 days     Patient not taking: Reported on 2024       Sodium-Glucose Co-Transporter 2 (SGLT2) Inhibitors       dapagliflozin (FARXIGA) 10 MG tablet Take 1 tablet by mouth every morning                Past Medical History, Past Surgical History, Family history, Social History, and Medications were all reviewed with the patient today and updated as necessary.     Prior to Admission medications    Medication Sig Start Date End Date Taking? Authorizing Provider   OXYGEN Inhale into the lungs 2lpm @night   Yes Provider, Historical,

## 2024-11-12 NOTE — THERAPY EVALUATION
independent sit to stand transfer, completed 5 reps for 30 second sit to stand test         Stairs:  doesn't complete it due to apprehension      Joint/Muscle ROM Strength Updates   Hip flexion B: 100 deg  B: 4/5 w/ increased pain     Hip extension B: limited due to tight hip flexors  B: 4-/5     Hip abduction  B: 3+/5     Knee flexion B: 110 deg  B: 4+/5     Knee extension B: 2 deg from 0  B: 4/5 w/ pain     DF         Outcome Measure:   Tool Used: Activities-Specific Balance Confidence (ABC) Scale  Score:  Initial: 72% Confidence Most Recent: X% Confidence (Date: -- )   Interpretation of Score: The test rates the patient’s percentage of confidence with functional daily activities from 0%, indicating no confidence, to 100%, indicating complete confidence. The percentages are added together and then averaged. If the average is less than 80%, this indicates significant impairment with daily activities.    Tool Used: Lower Extremity Functional Scale (LEFS)  Score:  Initial: 34/80 Most Recent: X/80 (Date: -- )   Interpretation of Score: 20 questions each scored on a 5 point scale with 0 representing \"extreme difficulty or unable to perform\" and 4 representing \"no difficulty\".  The lower the score, the greater the functional disability. 80/80 represents no disability.  Minimal detectable change is 9 points.    Tool Used: Timed “Up and Go” (TUG)  Score:  Initial: 15 seconds Most Recent: X seconds (Date: -- )   Interpretation of Score: The test measures, in seconds, the time taken by an individual to stand up from a standard arm chair (seat height 46 cm [18 in], arm height 65 cm [25.6 in]), walk a distance of 3 meters (118 in, approx 10 ft), turn, walk back to the chair and sit down.  If the individual takes longer than 14 seconds to complete TUG, this indicates risk for falls.        ASSESSMENT   Initial Assessment:  Ms. Nieto presents to PT with c/o of decreased balance and altered gait mechanics due to debility and

## 2024-11-13 ASSESSMENT — PAIN SCALES - GENERAL: PAINLEVEL_OUTOF10: 2

## 2024-11-15 ENCOUNTER — TELEPHONE (OUTPATIENT)
Dept: PRIMARY CARE CLINIC | Facility: CLINIC | Age: 84
End: 2024-11-15

## 2024-11-15 NOTE — TELEPHONE ENCOUNTER
1-Calcium is elevated : stop calcium supplement for couple of months .   2-Kidney function elevated : hydration is important and avoid taking aleve , motrin .   3-A1c : 6.3 prediabetic check if she  Zepbound .   4-hemoglobin 10.8 slightly anemic . Increase lean meats , food high in iron   5-rest labs  normal   Repeat labs in 3 months     Called patient left voicemail asking to return the call to go over blood work results.

## 2024-11-18 ENCOUNTER — ANTI-COAG VISIT (OUTPATIENT)
Age: 84
End: 2024-11-18

## 2024-11-18 ENCOUNTER — HOSPITAL ENCOUNTER (OUTPATIENT)
Dept: PHYSICAL THERAPY | Age: 84
Setting detail: RECURRING SERIES
Discharge: HOME | End: 2024-11-21
Payer: MEDICARE

## 2024-11-18 DIAGNOSIS — I48.0 PAROXYSMAL ATRIAL FIBRILLATION (HCC): Primary | ICD-10-CM

## 2024-11-18 DIAGNOSIS — Z79.01 LONG TERM CURRENT USE OF ANTICOAGULANT: ICD-10-CM

## 2024-11-18 LAB
POC INR: 1.6
PROTHROMBIN TIME, POC: NORMAL

## 2024-11-18 PROCEDURE — 97110 THERAPEUTIC EXERCISES: CPT

## 2024-11-18 ASSESSMENT — PAIN SCALES - GENERAL: PAINLEVEL_OUTOF10: 1

## 2024-11-18 NOTE — PROGRESS NOTES
Jeanne Nieto  : 1940  Primary: Medicare Part A And B (Medicare)  Secondary[facility] [facility]  [address]  [address]  [facility] [address]  Phone: [phone]  Fax: [phone] Plan Frequency: 2 times a week    Plan of Care/Certification Expiration Date: 02/10/25        Plan of Care/Certification Expiration Date:  Plan of Care/Certification Expiration Date: 02/10/25    Frequency/Duration:   Plan Frequency: 2 times a week      Time In/Out:   Time In: 1030  Time Out: 1115      PT Visit Info:    Total # of Visits to Date: 2      Visit Count:  2    OUTPATIENT PHYSICAL THERAPY:   Treatment Note 2024       Episode  (decreased balance )               Treatment Diagnosis:    Debility  Balance disorder  Medical/Referring Diagnosis:    At risk for falls [Z91.81]    Referring Physician:  Reyes, Rayma Y, APRN - CNP MD Orders:  PT Eval and Treat   Return MD Appt:  NA   Date of Onset:  Onset Date:  (Chronic, has got worse in the past year)     Allergies:   Ibuprofen and Midol complete [acetaminophen-caff-pyrilamine]  Restrictions/Precautions:   None      Interventions Planned (Treatment may consist of any combination of the following):     See Assessment Note    Subjective Comments:   Patient reports that she is doing good, continues to feel fairly off balance with ADLs.  Initial Pain Level::     1/10  Post Session Pain Level:       10  Medications Last Reviewed:  2024  Updated Objective Findings:  None Today  Treatment   THERAPEUTIC EXERCISE: (40 minutes):    Exercises per grid below to improve mobility, strength, and balance.  Required moderate visual, verbal, manual, and tactile cues to promote proper body alignment, promote proper body posture, and promote proper body mechanics.  Progressed resistance, range, and repetitions as indicated.   Date:  24 Date:   Date:     Activity/Exercise Parameters Parameters Parameters   NuStep L3 x 6 min for ROM      Hamstring stretch

## 2024-11-21 ENCOUNTER — HOSPITAL ENCOUNTER (OUTPATIENT)
Dept: PHYSICAL THERAPY | Age: 84
Setting detail: RECURRING SERIES
Discharge: HOME | End: 2024-11-24
Payer: MEDICARE

## 2024-11-21 PROCEDURE — 97110 THERAPEUTIC EXERCISES: CPT

## 2024-11-21 NOTE — PROGRESS NOTES
Jeanne Nieto  : 1940  Primary: Medicare Part A And B (Medicare)  Secondary: [facility] [facility]IC[facility]PP [facility]  [address]  [address]  [facility] [address]  Phone: [phone]  Fax: [phone] Plan Frequency: 2 times a week    Plan of Care/Certification Expiration Date: 02/10/25        Plan of Care/Certification Expiration Date:  Plan of Care/Certification Expiration Date: 02/10/25    Frequency/Duration:   Plan Frequency: 2 times a week      Time In/Out:   Time In: 1645  Time Out: 1730      PT Visit Info:    Total # of Visits to Date: 3      Visit Count:  3    OUTPATIENT PHYSICAL THERAPY:   Treatment Note 2024       Episode  (decreased balance )               Treatment Diagnosis:    Debility  Balance disorder  Medical/Referring Diagnosis:    At risk for falls [Z91.81]    Referring Physician:  Reyes, Rayma Y, APRN - CNP MD Orders:  PT Eval and Treat   Return MD Appt:  NA   Date of Onset:  Onset Date:  (Chronic, has got worse in the past year)     Allergies:   Ibuprofen and Midol complete [acetaminophen-caff-pyrilamine]  Restrictions/Precautions:   None      Interventions Planned (Treatment may consist of any combination of the following):     See Assessment Note    Subjective Comments:   Patient states that she is doing good, had some soreness following her previous session.   Initial Pain Level::     2/10  Post Session Pain Level:       2/10  Medications Last Reviewed:  2024  Updated Objective Findings:  None Today  Treatment   THERAPEUTIC EXERCISE: (40 minutes):    Exercises per grid below to improve mobility, strength, and balance.  Required moderate visual, verbal, manual, and tactile cues to promote proper body alignment, promote proper body posture, and promote proper body mechanics.  Progressed resistance, range, and repetitions as indicated.   Date:  24 Date:   Date:     Activity/Exercise Parameters Parameters Parameters   NuStep L3 x 6 min for ROM  L3 x 6 min for

## 2024-11-24 ASSESSMENT — PAIN SCALES - GENERAL: PAINLEVEL_OUTOF10: 2

## 2024-11-25 ENCOUNTER — HOSPITAL ENCOUNTER (OUTPATIENT)
Dept: PHYSICAL THERAPY | Age: 84
Setting detail: RECURRING SERIES
Discharge: HOME | End: 2024-11-28
Payer: MEDICARE

## 2024-11-25 ENCOUNTER — ANTI-COAG VISIT (OUTPATIENT)
Age: 84
End: 2024-11-25
Payer: MEDICARE

## 2024-11-25 DIAGNOSIS — Z79.01 LONG TERM CURRENT USE OF ANTICOAGULANT: ICD-10-CM

## 2024-11-25 DIAGNOSIS — I48.0 PAROXYSMAL ATRIAL FIBRILLATION (HCC): Primary | ICD-10-CM

## 2024-11-25 LAB
POC INR: 1.8
PROTHROMBIN TIME, POC: ABNORMAL

## 2024-11-25 PROCEDURE — 97110 THERAPEUTIC EXERCISES: CPT

## 2024-11-25 PROCEDURE — 85610 PROTHROMBIN TIME: CPT | Performed by: INTERNAL MEDICINE

## 2024-11-25 PROCEDURE — 93793 ANTICOAG MGMT PT WARFARIN: CPT | Performed by: INTERNAL MEDICINE

## 2024-11-25 ASSESSMENT — PAIN SCALES - GENERAL: PAINLEVEL_OUTOF10: 1

## 2024-11-25 NOTE — PROGRESS NOTES
Jeanne Nieto  : 1940  Primary: Medicare Part A And B (Medicare)  Secondary[facility]  [address]  [address]  [facility] [address]  Phone: [phone]  Fax: [phone] Plan Frequency: 2 times a week    Plan of Care/Certification Expiration Date: 02/10/25        Plan of Care/Certification Expiration Date:  Plan of Care/Certification Expiration Date: 02/10/25    Frequency/Duration:   Plan Frequency: 2 times a week      Time In/Out:   Time In: 1030  Time Out: 1115      PT Visit Info:    Total # of Visits to Date: 4      Visit Count:  4    OUTPATIENT PHYSICAL THERAPY:   Treatment Note 2024       Episode  (decreased balance )               Treatment Diagnosis:    Debility  Balance disorder  Medical/Referring Diagnosis:    At risk for falls [Z91.81]    Referring Physician:  Reyes, Rayma Y, APRN - CNP MD Orders:  PT Eval and Treat   Return MD Appt:  NA   Date of Onset:  Onset Date:  (Chronic, has got worse in the past year)     Allergies:   Ibuprofen and Midol complete [acetaminophen-caff-pyrilamine]  Restrictions/Precautions:   None      Interventions Planned (Treatment may consist of any combination of the following):     See Assessment Note    Subjective Comments:   Patient is doing well, had a fairly busy weekend and felt fine after our last session.     Initial Pain Level::     1/10  Post Session Pain Level:       10  Medications Last Reviewed:  2024  Updated Objective Findings:  None Today  Treatment   THERAPEUTIC EXERCISE: (45 minutes):    Exercises per grid below to improve mobility, strength, and balance.  Required moderate visual, verbal, manual, and tactile cues to promote proper body alignment, promote proper body posture, and promote proper body mechanics.  Progressed resistance, range, and repetitions as indicated.   Date:  24 Date:   Date:     Activity/Exercise Parameters Parameters Parameters   NuStep L3 x 6 min for ROM  L3 x 6 min

## 2024-11-25 NOTE — PROGRESS NOTES
Warfarin tablet strength and weekly dosing schedule confirmed today. Patient to continue current maintenance plan (see Anticoag Dosing Calendar). INR to be rechecked in one week(s).

## 2024-11-25 NOTE — PATIENT INSTRUCTIONS
Reminder: Please contact the Coumadin Clinic at [phone] when you have medication changes. Examples, new medications, antibiotics, discontinued medications, new supplements, missed doses of warfarin or if you took extra doses of warfarin.  This also includes OTC medications. Notifying us helps reduce the possibility of high and low INR's. In addition, if warfarin needs to be held for any procedures, please have surgeon or physician's office contact us before holding anticoagulant. Thanks, [facility] Coumadin [facility].       Please go to the Emergency Department if you experience:  - Extreme shortness of breath or chest pain  - Red, warm, painful, swollen limb  - Numbness or tingling on one side of the body  - Slurred speech or major vision change  - Extreme headache

## 2024-11-27 ENCOUNTER — APPOINTMENT (OUTPATIENT)
Dept: PHYSICAL THERAPY | Age: 84
End: 2024-11-27
Payer: MEDICARE

## 2024-12-02 ENCOUNTER — OFFICE VISIT (OUTPATIENT)
Dept: PRIMARY CARE CLINIC | Facility: CLINIC | Age: 84
End: 2024-12-02
Payer: MEDICARE

## 2024-12-02 VITALS
HEART RATE: 55 BPM | HEIGHT: 60 IN | BODY MASS INDEX: 42.01 KG/M2 | TEMPERATURE: 97.6 F | OXYGEN SATURATION: 95 % | DIASTOLIC BLOOD PRESSURE: 61 MMHG | WEIGHT: 214 LBS | SYSTOLIC BLOOD PRESSURE: 129 MMHG

## 2024-12-02 DIAGNOSIS — D68.69 SECONDARY HYPERCOAGULABLE STATE (HCC): ICD-10-CM

## 2024-12-02 DIAGNOSIS — I34.2 NONRHEUMATIC MITRAL VALVE STENOSIS: Chronic | ICD-10-CM

## 2024-12-02 DIAGNOSIS — G47.33 OSA (OBSTRUCTIVE SLEEP APNEA): Chronic | ICD-10-CM

## 2024-12-02 DIAGNOSIS — Z00.00 MEDICARE ANNUAL WELLNESS VISIT, SUBSEQUENT: Primary | ICD-10-CM

## 2024-12-02 DIAGNOSIS — I10 PRIMARY HYPERTENSION: ICD-10-CM

## 2024-12-02 DIAGNOSIS — I08.0 MITRAL VALVE STENOSIS AND AORTIC VALVE STENOSIS: Primary | ICD-10-CM

## 2024-12-02 DIAGNOSIS — Z91.89 AT HIGH RISK FOR CARDIOVASCULAR DISEASE: ICD-10-CM

## 2024-12-02 DIAGNOSIS — G25.81 RLS (RESTLESS LEGS SYNDROME): ICD-10-CM

## 2024-12-02 DIAGNOSIS — I48.91 ATRIAL FIBRILLATION WITH RAPID VENTRICULAR RESPONSE (HCC): ICD-10-CM

## 2024-12-02 DIAGNOSIS — M79.641 CHRONIC HAND PAIN, RIGHT: ICD-10-CM

## 2024-12-02 DIAGNOSIS — G89.29 CHRONIC HAND PAIN, RIGHT: ICD-10-CM

## 2024-12-02 PROCEDURE — G8482 FLU IMMUNIZE ORDER/ADMIN: HCPCS

## 2024-12-02 PROCEDURE — 1160F RVW MEDS BY RX/DR IN RCRD: CPT

## 2024-12-02 PROCEDURE — 3074F SYST BP LT 130 MM HG: CPT

## 2024-12-02 PROCEDURE — 1123F ACP DISCUSS/DSCN MKR DOCD: CPT

## 2024-12-02 PROCEDURE — 1159F MED LIST DOCD IN RCRD: CPT

## 2024-12-02 PROCEDURE — G0439 PPPS, SUBSEQ VISIT: HCPCS

## 2024-12-02 PROCEDURE — 3078F DIAST BP <80 MM HG: CPT

## 2024-12-02 RX ORDER — TIRZEPATIDE 2.5 MG/.5ML
2.5 INJECTION, SOLUTION SUBCUTANEOUS
Qty: 6 ML | Refills: 1 | Status: SHIPPED | OUTPATIENT
Start: 2024-12-02

## 2024-12-02 RX ORDER — GABAPENTIN 300 MG/1
300 CAPSULE ORAL DAILY
Qty: 90 CAPSULE | Refills: 1 | Status: SHIPPED | OUTPATIENT
Start: 2024-12-02 | End: 2025-05-31

## 2024-12-02 ASSESSMENT — PATIENT HEALTH QUESTIONNAIRE - PHQ9
SUM OF ALL RESPONSES TO PHQ QUESTIONS 1-9: 3
9. THOUGHTS THAT YOU WOULD BE BETTER OFF DEAD, OR OF HURTING YOURSELF: NOT AT ALL
SUM OF ALL RESPONSES TO PHQ QUESTIONS 1-9: 3
7. TROUBLE CONCENTRATING ON THINGS, SUCH AS READING THE NEWSPAPER OR WATCHING TELEVISION: NOT AT ALL
8. MOVING OR SPEAKING SO SLOWLY THAT OTHER PEOPLE COULD HAVE NOTICED. OR THE OPPOSITE, BEING SO FIGETY OR RESTLESS THAT YOU HAVE BEEN MOVING AROUND A LOT MORE THAN USUAL: NOT AT ALL
SUM OF ALL RESPONSES TO PHQ QUESTIONS 1-9: 3
2. FEELING DOWN, DEPRESSED OR HOPELESS: NOT AT ALL
4. FEELING TIRED OR HAVING LITTLE ENERGY: SEVERAL DAYS
SUM OF ALL RESPONSES TO PHQ9 QUESTIONS 1 & 2: 0
1. LITTLE INTEREST OR PLEASURE IN DOING THINGS: NOT AT ALL
5. POOR APPETITE OR OVEREATING: SEVERAL DAYS
3. TROUBLE FALLING OR STAYING ASLEEP: SEVERAL DAYS
SUM OF ALL RESPONSES TO PHQ QUESTIONS 1-9: 3
6. FEELING BAD ABOUT YOURSELF - OR THAT YOU ARE A FAILURE OR HAVE LET YOURSELF OR YOUR FAMILY DOWN: NOT AT ALL
10. IF YOU CHECKED OFF ANY PROBLEMS, HOW DIFFICULT HAVE THESE PROBLEMS MADE IT FOR YOU TO DO YOUR WORK, TAKE CARE OF THINGS AT HOME, OR GET ALONG WITH OTHER PEOPLE: NOT DIFFICULT AT ALL

## 2024-12-02 ASSESSMENT — LIFESTYLE VARIABLES
HOW MANY STANDARD DRINKS CONTAINING ALCOHOL DO YOU HAVE ON A TYPICAL DAY: 1 OR 2
HOW OFTEN DO YOU HAVE A DRINK CONTAINING ALCOHOL: 2-4 TIMES A MONTH

## 2024-12-02 NOTE — PROGRESS NOTES
Medicare Annual Wellness Visit    Jeanne Nieto is here for Medicare AWV (Requested for Trazodone /zepbound)    Assessment & Plan   Medicare annual wellness visit, subsequent  RLS (restless legs syndrome)  -     gabapentin (NEURONTIN) 300 MG capsule; Take 1 capsule by mouth daily for 180 days. Take 1 or 2 capsules at bedtime as needed for restless leg syndrome., Disp-90 capsule, R-1Normal  Chronic hand pain, right  -     [facility] (Hand Surgery)    Recommendations for Preventive Services Due: see orders and patient instructions/AVS.  Recommended screening schedule for the next 5-10 years is provided to the patient in written form: see Patient Instructions/AVS.     Return in 3 months (on 3/2/2025) for labs /weight .     Subjective   The following acute and/or chronic problems were also addressed today:  Right hand pain / carpal tunnel night brace not helping some days are worse than others .    Patient's complete Health Risk Assessment and screening values have been reviewed and are found in Flowsheets. The following problems were reviewed today and where indicated follow up appointments were made and/or referrals ordered.    Positive Risk Factor Screenings with Interventions:    Fall Risk:  Do you feel unsteady or are you worried about falling? : (!) yes  2 or more falls in past year?: no  Fall with injury in past year?: no     Interventions:    Reviewed medications, home hazards, visual acuity, and co-morbidities that can increase risk for falls  See AVS for additional education material    Patient doing Physical Therapy .             Inactivity:  On average, how many days per week do you engage in moderate to strenuous exercise (like a brisk walk)?: 2 days (!) Abnormal  On average, how many minutes do you engage in exercise at this level?: 40 min  Interventions:  See AVS for additional education material     Abnormal BMI (obese):  Body mass index is 41.79 kg/m². (!) Abnormal  Interventions:  See

## 2024-12-03 ENCOUNTER — HOSPITAL ENCOUNTER (OUTPATIENT)
Dept: PHYSICAL THERAPY | Age: 84
Setting detail: RECURRING SERIES
Discharge: HOME | End: 2024-12-06
Payer: MEDICARE

## 2024-12-03 DIAGNOSIS — E66.01 CLASS 3 SEVERE OBESITY DUE TO EXCESS CALORIES WITH SERIOUS COMORBIDITY AND BODY MASS INDEX (BMI) OF 40.0 TO 44.9 IN ADULT: Chronic | ICD-10-CM

## 2024-12-03 DIAGNOSIS — E66.813 CLASS 3 SEVERE OBESITY DUE TO EXCESS CALORIES WITH SERIOUS COMORBIDITY AND BODY MASS INDEX (BMI) OF 40.0 TO 44.9 IN ADULT: Chronic | ICD-10-CM

## 2024-12-03 DIAGNOSIS — I48.91 ATRIAL FIBRILLATION WITH RAPID VENTRICULAR RESPONSE (HCC): ICD-10-CM

## 2024-12-03 DIAGNOSIS — G47.33 OSA (OBSTRUCTIVE SLEEP APNEA): Primary | Chronic | ICD-10-CM

## 2024-12-03 PROCEDURE — 97110 THERAPEUTIC EXERCISES: CPT

## 2024-12-03 ASSESSMENT — PAIN SCALES - GENERAL: PAINLEVEL_OUTOF10: 5

## 2024-12-03 NOTE — PROGRESS NOTES
Jeanne Nieto  : 1940  Primary: Medicare Part A And B (Medicare)  Secondary: [facility]  [address]  [address]  [facility] [address]  Phone: [phone]  Fax: [phone] Plan Frequency: 2 times a week    Plan of Care/Certification Expiration Date: 02/10/25        Plan of Care/Certification Expiration Date:  Plan of Care/Certification Expiration Date: 02/10/25    Frequency/Duration:   Plan Frequency: 2 times a week      Time In/Out:   Time In: 0830  Time Out: 0900      PT Visit Info:    Total # of Visits to Date: 5      Visit Count:  5    OUTPATIENT PHYSICAL THERAPY:   Treatment Note 12/3/2024       Episode  (decreased balance )               Treatment Diagnosis:    Debility  Balance disorder  Medical/Referring Diagnosis:    At risk for falls [Z91.81]    Referring Physician:  Reyes, Rayma Y, APRN - CNP MD Orders:  PT Eval and Treat   Return MD Appt:  NA   Date of Onset:  Onset Date:  (Chronic, has got worse in the past year)     Allergies:   Ibuprofen and Midol complete [acetaminophen-caff-pyrilamine]  Restrictions/Precautions:   None      Interventions Planned (Treatment may consist of any combination of the following):     See Assessment Note    Subjective Comments:   Patient states that she is doing good, apologizes for being late.      Initial Pain Level::     5/10  Post Session Pain Level:       7/10  Medications Last Reviewed:  12/3/2024  Updated Objective Findings:  None Today  Treatment   THERAPEUTIC EXERCISE: (30 minutes):    Exercises per grid below to improve mobility, strength, and balance.  Required moderate visual, verbal, manual, and tactile cues to promote proper body alignment, promote proper body posture, and promote proper body mechanics.  Progressed resistance, range, and repetitions as indicated.   Date:  24 Date:   Date:   Date:   12/3   Activity/Exercise Parameters Parameters Parameters Parameters    NuStep L3 x 6 min for ROM  L3 x 6

## 2024-12-04 DIAGNOSIS — E66.813 CLASS 3 SEVERE OBESITY DUE TO EXCESS CALORIES WITH SERIOUS COMORBIDITY AND BODY MASS INDEX (BMI) OF 40.0 TO 44.9 IN ADULT: Primary | ICD-10-CM

## 2024-12-04 DIAGNOSIS — E66.01 CLASS 3 SEVERE OBESITY DUE TO EXCESS CALORIES WITH SERIOUS COMORBIDITY AND BODY MASS INDEX (BMI) OF 40.0 TO 44.9 IN ADULT: Primary | ICD-10-CM

## 2024-12-05 ENCOUNTER — ANTI-COAG VISIT (OUTPATIENT)
Age: 84
End: 2024-12-05

## 2024-12-05 ENCOUNTER — HOSPITAL ENCOUNTER (OUTPATIENT)
Dept: PHYSICAL THERAPY | Age: 84
Setting detail: RECURRING SERIES
End: 2024-12-05
Payer: MEDICARE

## 2024-12-05 DIAGNOSIS — I48.0 PAROXYSMAL ATRIAL FIBRILLATION (HCC): Primary | ICD-10-CM

## 2024-12-05 DIAGNOSIS — Z79.01 LONG TERM CURRENT USE OF ANTICOAGULANT: ICD-10-CM

## 2024-12-05 LAB
POC INR: 1.7
PROTHROMBIN TIME, POC: ABNORMAL

## 2024-12-05 NOTE — PATIENT INSTRUCTIONS
Reminder: Please contact the Coumadin Clinic at [phone] when you have medication changes. Examples, new medications, antibiotics, discontinued medications, new supplements, missed doses of warfarin or if you took extra doses of warfarin.  This also includes OTC medications. Notifying us helps reduce the possibility of high and low INR's. In addition, if warfarin needs to be held for any procedures, please have surgeon or physician's office contact us before holding anticoagulant. Thanks, [facility] Coumadin [facility].       Please go to the Emergency Department if you experience:  - Extreme shortness of breath or chest pain  - Red, warm, painful, swollen limb  - Numbness or tingling on one side of the body  - Slurred speech or major vision change  - Extreme headache

## 2024-12-05 NOTE — PROGRESS NOTES
Warfarin tablet strength and weekly dosing schedule confirmed today. Patient knows of no reason for subtherapeutic INR result.  Maintenance plan increased to 5 mg daily, (see Anticoag Dosing Calendar). INR to be rechecked in one week.

## 2024-12-09 ENCOUNTER — APPOINTMENT (OUTPATIENT)
Dept: PHYSICAL THERAPY | Age: 84
End: 2024-12-09
Payer: MEDICARE

## 2024-12-12 ENCOUNTER — ANTI-COAG VISIT (OUTPATIENT)
Age: 84
End: 2024-12-12

## 2024-12-12 ENCOUNTER — APPOINTMENT (OUTPATIENT)
Dept: PHYSICAL THERAPY | Age: 84
End: 2024-12-12
Payer: MEDICARE

## 2024-12-12 DIAGNOSIS — I48.0 PAROXYSMAL ATRIAL FIBRILLATION (HCC): Primary | ICD-10-CM

## 2024-12-12 DIAGNOSIS — Z79.01 LONG TERM CURRENT USE OF ANTICOAGULANT: ICD-10-CM

## 2024-12-12 LAB
POC INR: 2.4
PROTHROMBIN TIME, POC: NORMAL

## 2024-12-12 NOTE — PROGRESS NOTES
Warfarin tablet strength and weekly dosing schedule confirmed today. Therapeutic INR, patient to continue current maintenance plan (see Anticoag Dosing Calendar). INR to be rechecked in one week(s).

## 2024-12-12 NOTE — PATIENT INSTRUCTIONS
Reminder: Please contact the Coumadin Clinic at [phone] when you have medication changes. Examples, new medications, antibiotics, discontinued medications, new supplements, missed doses of warfarin or if you took extra doses of warfarin.  This also includes OTC medications. Notifying us helps reduce the possibility of high and low INR's. In addition, if warfarin needs to be held for any procedures, please have surgeon or physician's office contact us before holding anticoagulant. Thanks, [facility] Coumadin [facility].       Please go to the Emergency Department if you experience:  - Extreme shortness of breath or chest pain  - Red, warm, painful, swollen limb  - Numbness or tingling on one side of the body  - Slurred speech or major vision change  - Extreme headache

## 2024-12-16 ENCOUNTER — APPOINTMENT (OUTPATIENT)
Dept: PHYSICAL THERAPY | Age: 84
End: 2024-12-16
Payer: MEDICARE

## 2024-12-16 ENCOUNTER — OFFICE VISIT (OUTPATIENT)
Dept: ORTHOPEDIC SURGERY | Age: 84
End: 2024-12-16
Payer: MEDICARE

## 2024-12-16 VITALS — BODY MASS INDEX: 41.23 KG/M2 | WEIGHT: 210 LBS | HEIGHT: 60 IN

## 2024-12-16 DIAGNOSIS — M25.561 PAIN IN BOTH KNEES, UNSPECIFIED CHRONICITY: Primary | ICD-10-CM

## 2024-12-16 DIAGNOSIS — M17.0 PRIMARY OSTEOARTHRITIS OF BOTH KNEES: ICD-10-CM

## 2024-12-16 DIAGNOSIS — M25.562 PAIN IN BOTH KNEES, UNSPECIFIED CHRONICITY: Primary | ICD-10-CM

## 2024-12-16 PROCEDURE — G8400 PT W/DXA NO RESULTS DOC: HCPCS | Performed by: NURSE PRACTITIONER

## 2024-12-16 PROCEDURE — 99204 OFFICE O/P NEW MOD 45 MIN: CPT | Performed by: NURSE PRACTITIONER

## 2024-12-16 PROCEDURE — G8428 CUR MEDS NOT DOCUMENT: HCPCS | Performed by: NURSE PRACTITIONER

## 2024-12-16 PROCEDURE — G8482 FLU IMMUNIZE ORDER/ADMIN: HCPCS | Performed by: NURSE PRACTITIONER

## 2024-12-16 PROCEDURE — G8417 CALC BMI ABV UP PARAM F/U: HCPCS | Performed by: NURSE PRACTITIONER

## 2024-12-16 PROCEDURE — 20610 DRAIN/INJ JOINT/BURSA W/O US: CPT | Performed by: NURSE PRACTITIONER

## 2024-12-16 PROCEDURE — 1036F TOBACCO NON-USER: CPT | Performed by: NURSE PRACTITIONER

## 2024-12-16 PROCEDURE — 1090F PRES/ABSN URINE INCON ASSESS: CPT | Performed by: NURSE PRACTITIONER

## 2024-12-16 PROCEDURE — 1123F ACP DISCUSS/DSCN MKR DOCD: CPT | Performed by: NURSE PRACTITIONER

## 2024-12-16 RX ORDER — METHYLPREDNISOLONE ACETATE 40 MG/ML
40 INJECTION, SUSPENSION INTRA-ARTICULAR; INTRALESIONAL; INTRAMUSCULAR; SOFT TISSUE ONCE
Status: COMPLETED | OUTPATIENT
Start: 2024-12-16 | End: 2024-12-16

## 2024-12-16 RX ADMIN — METHYLPREDNISOLONE ACETATE 40 MG: 40 INJECTION, SUSPENSION INTRA-ARTICULAR; INTRALESIONAL; INTRAMUSCULAR; SOFT TISSUE at 09:28

## 2024-12-16 NOTE — PROGRESS NOTES
Patient ID:  Jeanne Nieto  [id number]  84 y.o.  1940    Today: December 16, 2024          Chief Complaint:  Bilateral Knee pain    HPI:       Jeanne Nieto is a 84 y.o. female  that presents today for evaluation and treatment of bilateral knee pain. Patient reports onset of pain was some time ago and has recently become more significant. The patient complains of knee pain with activities, reports stiffness of the knee with prolonged inactivity, and swelling/pain at the end of the day and after increased physical activity.  Pain is described as a general ache with occasional sharp pain, primarily along the joint lines. The patient reports pain ranging from 3-8 on the pain scale which seems to fluctuate depending on the day/activity.  Generally, symptoms improve with sitting/rest.  The pain affects the patient’s activities of daily living and quality of life. Patient reports progressive pain and instability in the knee.     Patient has attempted prior conservative treatment including Tylenol.    Past Medical History:  Past Medical History:   Diagnosis Date    Aortic stenosis     Asthma     Chronic erosive gastritis     Closed fracture of acetabulum ([facility]) 10/29/2018    Closed fracture of sacrum ([facility]) 10/28/2018    Colon polyps     Contusion of head 10/29/2018    Debility 10/29/2018    Depression     Fracture of thoracic spine ([facility]) 10/28/2018    GERD (gastroesophageal reflux disease)     Hematuria 10/29/2018    Herpes zoster 10/29/2018    History of falling 10/28/2018    Hypertension     Hypothyroidism     Insomnia 01/26/2024    Iron deficiency anemia     Joint stiffness 10/30/2024    Mixed hyperlipidemia 04/03/2023    Multiple fractures of pelvis without disruption of pelvic ring, initial encounter for open fracture (HCC) 10/28/2018    Multiple fractures of ribs, right side, initial encounter for closed fracture 10/28/2018    Oliguria 10/29/2018    Osteoarthritis     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

## 2024-12-19 ENCOUNTER — APPOINTMENT (OUTPATIENT)
Dept: PHYSICAL THERAPY | Age: 84
End: 2024-12-19
Payer: MEDICARE

## 2024-12-19 ENCOUNTER — OFFICE VISIT (OUTPATIENT)
Age: 84
End: 2024-12-19
Payer: MEDICARE

## 2024-12-19 ENCOUNTER — ANTI-COAG VISIT (OUTPATIENT)
Age: 84
End: 2024-12-19

## 2024-12-19 DIAGNOSIS — Z79.01 LONG TERM CURRENT USE OF ANTICOAGULANT: ICD-10-CM

## 2024-12-19 DIAGNOSIS — G56.03 CARPAL TUNNEL SYNDROME, BILATERAL: Primary | ICD-10-CM

## 2024-12-19 DIAGNOSIS — I48.0 PAROXYSMAL ATRIAL FIBRILLATION (HCC): Primary | ICD-10-CM

## 2024-12-19 LAB
POC INR: 2.6
PROTHROMBIN TIME, POC: NORMAL

## 2024-12-19 PROCEDURE — 1090F PRES/ABSN URINE INCON ASSESS: CPT | Performed by: ORTHOPAEDIC SURGERY

## 2024-12-19 PROCEDURE — 1159F MED LIST DOCD IN RCRD: CPT | Performed by: ORTHOPAEDIC SURGERY

## 2024-12-19 PROCEDURE — 1036F TOBACCO NON-USER: CPT | Performed by: ORTHOPAEDIC SURGERY

## 2024-12-19 PROCEDURE — G8427 DOCREV CUR MEDS BY ELIG CLIN: HCPCS | Performed by: ORTHOPAEDIC SURGERY

## 2024-12-19 PROCEDURE — G8417 CALC BMI ABV UP PARAM F/U: HCPCS | Performed by: ORTHOPAEDIC SURGERY

## 2024-12-19 PROCEDURE — G8482 FLU IMMUNIZE ORDER/ADMIN: HCPCS | Performed by: ORTHOPAEDIC SURGERY

## 2024-12-19 PROCEDURE — 99203 OFFICE O/P NEW LOW 30 MIN: CPT | Performed by: ORTHOPAEDIC SURGERY

## 2024-12-19 PROCEDURE — G8400 PT W/DXA NO RESULTS DOC: HCPCS | Performed by: ORTHOPAEDIC SURGERY

## 2024-12-19 PROCEDURE — 1123F ACP DISCUSS/DSCN MKR DOCD: CPT | Performed by: ORTHOPAEDIC SURGERY

## 2024-12-19 NOTE — PROGRESS NOTES
Orthopaedic Hand Surgery Note    Name: Jeanne Nieto  YOB: 1940  Gender: female  MRN: [id number]    CC: hand numbness      HPI: Patient is a 84 y.o. female with a chief complaint of bilateral hand numbness and tingling in the median nerve distribution, right hand is painful. The symptoms have been going on for years. The patient does complain of night wakening and increased symptoms with driving. Evaluation to date has included none. Treatment to date has included braces.     ROS/Meds/PSH/PMH/FH/SH: I personally reviewed the patients standard intake form.  Pertinents are discussed In the HPI    Physical Examination:    Musculoskeletal:     Examination of the bilateral upper extremity demonstrates Decreased sensation to light touch in the median distribution, normal sensation in ulnar and radial distribution, Positive carpal tunnel compression testing and Phalen testing, cap refill < 5 seconds in all fingers. Inspection reveals mild thenar atrophy. Negative Tinel and elbow flexion compression test of the cubital tunnel, negative Tinel over Guyon's canal. Sensation to light touch in the ulnar 2 digits is normal with no intrinsic atrophy/weakness. No tenderness to palpation or masses noted in the forearm. Unable to fire SF FDP on the left    Imaging / Electrodiagnostic Tests:     none    Assessment:     ICD-10-CM    1. Carpal tunnel syndrome, bilateral  G56.03 Ambulatory referral to Neurology          Plan:  We discussed the diagnosis and different treatment options. We discussed observation, EMG/NCV, night splinting, cortisone injections and surgical decompression and the risks and benefits of all were clearly outlined. We discussed that carpal tunnel syndrome is a progressive condition, and that many people do require surgical decompression to alleviate symptoms and prevent permanent sensory dysfunction, motor weakness and muscle atrophy After discussing in detail the patient elects to proceed with

## 2024-12-19 NOTE — PATIENT INSTRUCTIONS
Reminder: Please contact the Coumadin Clinic at [phone] when you have medication changes. Examples, new medications, antibiotics, discontinued medications, new supplements, missed doses of warfarin or if you took extra doses of warfarin.  This also includes OTC medications. Notifying us helps reduce the possibility of high and low INR's. In addition, if warfarin needs to be held for any procedures, please have surgeon or physician's office contact us before holding anticoagulant. Thanks, [facility] Coumadin [facility].       Please go to the Emergency Department if you experience:  - Extreme shortness of breath or chest pain  - Red, warm, painful, swollen limb  - Numbness or tingling on one side of the body  - Slurred speech or major vision change  - Extreme headache

## 2025-01-02 ENCOUNTER — ANTI-COAG VISIT (OUTPATIENT)
Age: 85
End: 2025-01-02

## 2025-01-02 DIAGNOSIS — I48.0 PAROXYSMAL ATRIAL FIBRILLATION (HCC): Primary | ICD-10-CM

## 2025-01-02 DIAGNOSIS — Z79.01 LONG TERM CURRENT USE OF ANTICOAGULANT: ICD-10-CM

## 2025-01-02 LAB
POC INR: 2.4
PROTHROMBIN TIME, POC: NORMAL

## 2025-01-02 NOTE — PATIENT INSTRUCTIONS
Reminder: Please contact the Coumadin Clinic at [phone] when you have medication changes. Examples, new medications, antibiotics, discontinued medications, new supplements, missed doses of warfarin or if you took extra doses of warfarin.  This also includes OTC medications. Notifying us helps reduce the possibility of high and low INR's. In addition, if warfarin needs to be held for any procedures, please have surgeon or physician's office contact us before holding anticoagulant. Thanks, [facility] Cardiology Coumadin [facility].       Please go to the Emergency Department if you experience:  - Extreme shortness of breath or chest pain  - Red, warm, painful, swollen limb  - Numbness or tingling on one side of the body  - Slurred speech or major vision change  - Extreme headache

## 2025-01-16 ENCOUNTER — PROCEDURE VISIT (OUTPATIENT)
Dept: NEUROLOGY | Age: 85
End: 2025-01-16
Payer: MEDICARE

## 2025-01-16 VITALS — BODY MASS INDEX: 41.01 KG/M2 | HEART RATE: 100 BPM | HEIGHT: 60 IN | OXYGEN SATURATION: 98 %

## 2025-01-16 DIAGNOSIS — R20.2 NUMBNESS AND TINGLING IN BOTH HANDS: Primary | ICD-10-CM

## 2025-01-16 DIAGNOSIS — R20.0 NUMBNESS AND TINGLING IN BOTH HANDS: Primary | ICD-10-CM

## 2025-01-16 PROCEDURE — 95913 NRV CNDJ TEST 13/> STUDIES: CPT | Performed by: PSYCHIATRY & NEUROLOGY

## 2025-01-16 PROCEDURE — 95885 MUSC TST DONE W/NERV TST LIM: CPT | Performed by: PSYCHIATRY & NEUROLOGY

## 2025-01-16 NOTE — PROGRESS NOTES
EMG/Nerve Conduction Study Procedure Note  [address]    [address]  [place]  [address]   [phone]      Hx:    Exam:     84 y.o.  M W  female     EMG::  BUE.     Paresthesia hand.   Bilateral.  No atrophy.   No Marroquin.      Referring:    Dr. Mariaelena BENSON   hand    Technologist ::   Matthew Kraus  Hgt:   5 foot           Summary    EMG selected muscles sampled with CV studies upper.                  Controlled environmental factors / EMG lab.  Temperature.   NCV : sensory segments:          Markedly abnormal = right median distal SCV = ABSENT /no response of the SNAP.  Abnormal left median = slowed SCV with attenuated SNAP.  Normal bilateral ulnar bilateral radial SCV.  NCV transcarpal sensory segments:       Abnormal = markedly abnormal right median transcarpal = ABSENT /no response of the SNAP.  Slowed left median SCV with prolonged peak difference of latency at 0.81 ms (UL = 0.20 ms).  NCV Motor MCV segments:   Abnormal = severely prolonged right median TL 12.10 ms (UL = 4.15 ms) with severe attenuated CMAP however this is superimposed on a Thom-Yassine anastomosis there is also some slowing of the proximal median MCV.   Left median MCV is basically normal with some small or attenuated CMAP only.  Normal left Thom-Yassine anastomosis identified.  Normal bilateral ulnar MCV.       F-wave studies:     Equivocally prolonged right median F waves.  Normal left median bilateral ulnar F waves.       NEEDLE EMG:   Tested muscles::   Right FDI APB ADM  and  L APB  =   abnormal right FDI and ADM only with reduced recruitment.   Others are WNL.                   INTERPRETATION:     ELECTROPHYSIOLOGIC EVIDENCE OF SEVERE RIGHT MEDIAN NERVE ENTRAPMENT AT THE WRIST CARPAL SEGMENT WHICH IS SUPERIMPOSED ON A RELATIVELY COMPLETE THOM-YASSINE ANASTOMOSIS.  Left entrapment of the median nerve is mild.          CONCLUSION:   Compatible with bilateral carpal tunnel syndromes the right is severe; left is

## 2025-01-30 ENCOUNTER — ANTI-COAG VISIT (OUTPATIENT)
Age: 85
End: 2025-01-30
Payer: MEDICARE

## 2025-01-30 DIAGNOSIS — Z79.01 LONG TERM CURRENT USE OF ANTICOAGULANT: ICD-10-CM

## 2025-01-30 DIAGNOSIS — I48.0 PAROXYSMAL ATRIAL FIBRILLATION (HCC): Primary | ICD-10-CM

## 2025-01-30 LAB
POC INR: 2.5
PROTHROMBIN TIME, POC: NORMAL

## 2025-01-30 PROCEDURE — 93793 ANTICOAG MGMT PT WARFARIN: CPT | Performed by: INTERNAL MEDICINE

## 2025-01-30 PROCEDURE — 85610 PROTHROMBIN TIME: CPT | Performed by: INTERNAL MEDICINE

## 2025-01-30 NOTE — PATIENT INSTRUCTIONS
Reminder: Please contact the Coumadin Clinic at [phone] when you have medication changes. Examples, new medications, antibiotics, discontinued medications, new supplements, missed doses of warfarin or if you took extra doses of warfarin.  This also includes OTC medications. Notifying us helps reduce the possibility of high and low INR's. In addition, if warfarin needs to be held for any procedures, please have surgeon or physician's office contact us before holding anticoagulant. Thanks, [facility] Cardiology Coumadin Clinic.       Please go to the Emergency Department if you experience:  - Extreme shortness of breath or chest pain  - Red, warm, painful, swollen limb  - Numbness or tingling on one side of the body  - Slurred speech or major vision change  - Extreme headache

## 2025-02-27 ENCOUNTER — ANTI-COAG VISIT (OUTPATIENT)
Age: 85
End: 2025-02-27
Payer: MEDICARE

## 2025-02-27 DIAGNOSIS — I48.0 PAROXYSMAL ATRIAL FIBRILLATION (HCC): Primary | ICD-10-CM

## 2025-02-27 DIAGNOSIS — Z79.01 LONG TERM CURRENT USE OF ANTICOAGULANT: ICD-10-CM

## 2025-02-27 LAB
POC INR: 1.6
PROTHROMBIN TIME, POC: ABNORMAL

## 2025-02-27 PROCEDURE — 93793 ANTICOAG MGMT PT WARFARIN: CPT | Performed by: INTERNAL MEDICINE

## 2025-02-27 PROCEDURE — 85610 PROTHROMBIN TIME: CPT | Performed by: INTERNAL MEDICINE

## 2025-02-27 NOTE — PROGRESS NOTES
Warfarin tablet strength and weekly dosing schedule confirmed today.    Subtherapeutic INR result, pt missed dose. Will continue current maintenance plan and follow up in 2 weeks.

## 2025-02-27 NOTE — PATIENT INSTRUCTIONS
Reminder: Please contact the Coumadin Clinic at [phone] when you have medication changes. Examples, new medications, antibiotics, discontinued medications, new supplements, missed doses of warfarin or if you took extra doses of warfarin.  This also includes OTC medications. Notifying us helps reduce the possibility of high and low INR's. In addition, if warfarin needs to be held for any procedures, please have surgeon or physician's office contact us before holding anticoagulant. Thanks, [facility] Cardiology Coumadin Clinic.         Please go to the Emergency Department if you experience:  - Extreme shortness of breath or chest pain  - Red, warm, painful, swollen limb  - Numbness or tingling on one side of the body  - Slurred speech or major vision change  - Extreme headache

## 2025-03-07 ENCOUNTER — OFFICE VISIT (OUTPATIENT)
Dept: PULMONOLOGY | Age: 85
End: 2025-03-07
Payer: MEDICARE

## 2025-03-07 VITALS
BODY MASS INDEX: 39.85 KG/M2 | DIASTOLIC BLOOD PRESSURE: 82 MMHG | RESPIRATION RATE: 17 BRPM | WEIGHT: 203 LBS | HEART RATE: 90 BPM | HEIGHT: 60 IN | SYSTOLIC BLOOD PRESSURE: 126 MMHG | OXYGEN SATURATION: 95 %

## 2025-03-07 DIAGNOSIS — K21.9 GASTROESOPHAGEAL REFLUX DISEASE, UNSPECIFIED WHETHER ESOPHAGITIS PRESENT: ICD-10-CM

## 2025-03-07 DIAGNOSIS — E66.01 SEVERE OBESITY (BMI 35.0-39.9) WITH COMORBIDITY: Primary | ICD-10-CM

## 2025-03-07 DIAGNOSIS — G47.34 NOCTURNAL HYPOXIA: ICD-10-CM

## 2025-03-07 DIAGNOSIS — J45.51 SEVERE PERSISTENT ASTHMA WITH ACUTE EXACERBATION (HCC): ICD-10-CM

## 2025-03-07 DIAGNOSIS — F41.9 ANXIETY AND DEPRESSION: ICD-10-CM

## 2025-03-07 DIAGNOSIS — F32.A ANXIETY AND DEPRESSION: ICD-10-CM

## 2025-03-07 PROCEDURE — 3074F SYST BP LT 130 MM HG: CPT | Performed by: NURSE PRACTITIONER

## 2025-03-07 PROCEDURE — 1159F MED LIST DOCD IN RCRD: CPT | Performed by: NURSE PRACTITIONER

## 2025-03-07 PROCEDURE — 3079F DIAST BP 80-89 MM HG: CPT | Performed by: NURSE PRACTITIONER

## 2025-03-07 PROCEDURE — G8400 PT W/DXA NO RESULTS DOC: HCPCS | Performed by: NURSE PRACTITIONER

## 2025-03-07 PROCEDURE — 1090F PRES/ABSN URINE INCON ASSESS: CPT | Performed by: NURSE PRACTITIONER

## 2025-03-07 PROCEDURE — 99214 OFFICE O/P EST MOD 30 MIN: CPT | Performed by: NURSE PRACTITIONER

## 2025-03-07 PROCEDURE — 1036F TOBACCO NON-USER: CPT | Performed by: NURSE PRACTITIONER

## 2025-03-07 PROCEDURE — G8427 DOCREV CUR MEDS BY ELIG CLIN: HCPCS | Performed by: NURSE PRACTITIONER

## 2025-03-07 PROCEDURE — 1123F ACP DISCUSS/DSCN MKR DOCD: CPT | Performed by: NURSE PRACTITIONER

## 2025-03-07 PROCEDURE — G8417 CALC BMI ABV UP PARAM F/U: HCPCS | Performed by: NURSE PRACTITIONER

## 2025-03-07 RX ORDER — ALBUTEROL SULFATE 90 UG/1
2 INHALANT RESPIRATORY (INHALATION) EVERY 4 HOURS PRN
Qty: 3 EACH | Refills: 3 | Status: SHIPPED | OUTPATIENT
Start: 2025-03-07

## 2025-03-07 RX ORDER — DOXYCYCLINE HYCLATE 100 MG
100 TABLET ORAL 2 TIMES DAILY
Qty: 20 TABLET | Refills: 0 | Status: SHIPPED | OUTPATIENT
Start: 2025-03-07 | End: 2025-03-17

## 2025-03-07 RX ORDER — CITALOPRAM HYDROBROMIDE 20 MG/1
40 TABLET ORAL DAILY
Qty: 60 TABLET | Refills: 3 | Status: SHIPPED | OUTPATIENT
Start: 2025-03-07

## 2025-03-07 RX ORDER — MONTELUKAST SODIUM 10 MG/1
10 TABLET ORAL NIGHTLY
Qty: 30 TABLET | Refills: 11 | Status: SHIPPED | OUTPATIENT
Start: 2025-03-07

## 2025-03-07 RX ORDER — LEVALBUTEROL INHALATION SOLUTION 0.63 MG/3ML
0.63 SOLUTION RESPIRATORY (INHALATION)
Qty: 1 EACH | Refills: 0 | Status: SHIPPED | OUTPATIENT
Start: 2025-03-07

## 2025-03-07 RX ORDER — PREDNISONE 10 MG/1
TABLET ORAL
Qty: 30 TABLET | Refills: 0 | Status: SHIPPED | OUTPATIENT
Start: 2025-03-07

## 2025-03-07 RX ORDER — FLUTICASONE PROPIONATE AND SALMETEROL 500; 50 UG/1; UG/1
1 POWDER RESPIRATORY (INHALATION) EVERY 12 HOURS
Qty: 1 EACH | Refills: 11 | Status: SHIPPED | OUTPATIENT
Start: 2025-03-07

## 2025-03-07 NOTE — PROGRESS NOTES
atrium is mildly dilated.    Image quality is adequate. Contrast used: Definity. Technically difficult study due to patient's heart rhythm.      [facility] Reference Info:                                                                                                                Exposure History:  Second Hand Smoke Exposure: Yes  Birds: No  Asbestos: No  TB: No  Hot Tubs/Humidifier: No  Organic/Inorganic Dusts: No  Molds: No  Occupation/Hobbies: Housewife  Immunization History   Administered Date(s) Administered    COVID-19, MODERNA BLUE border, Primary or Immunocompromised, (age 12y+), IM, 100 mcg/0.5mL 02/19/2021, 03/12/2021, 10/31/2021    COVID-19, MODERNA Bivalent, (age 12y+), IM, 50 mcg/0.5 mL 04/08/2022    COVID-19, MODERNA Booster BLUE border, (age 18y+), IM, 50mcg/0.25mL 10/31/2021    COVID-19, PFIZER Bivalent, DO NOT Dilute, (age 12y+), IM, 30 mcg/0.3 mL 10/08/2022    COVID-19, PFIZER PURPLE top, DILUTE for use, (age 12 y+), 30mcg/0.3mL 02/19/2021, 03/12/2021    Influenza Virus Vaccine 10/01/2014, 11/02/2018    Influenza, FLUAD, (age 65 y+), IM, Trivalent PF, 0.5mL 10/30/2024    Influenza, FLUZONE High Dose (age 65 y+), IM, Quadv, 0.7mL 01/12/2010, 02/05/2014, 11/07/2014, 10/01/2022    PPD Test 11/11/2023    Pneumococcal, PCV-13, PREVNAR 13, (age 6w+), IM, 0.5mL 04/06/2015    Pneumococcal, PPSV23, PNEUMOVAX 23, (age 2y+), SC/IM, 0.5mL 10/21/2008    TD 5LF, TENIVAC, (age 7y+), IM, 0.5mL 09/22/1996, 01/12/2006    TDaP, ADACEL (age 10y-64y), BOOSTRIX (age 10y+), IM, 0.5mL 12/14/2016    Zoster Live (Zostavax) 03/06/2009     Past Medical History:   Diagnosis Date    Aortic stenosis     Asthma     Chronic erosive gastritis     Closed fracture of acetabulum ([facility]) 10/29/2018    Closed fracture of sacrum ([facility]) 10/28/2018    Colon polyps     Contusion of head 10/29/2018    Debility 10/29/2018    Depression     Fracture of thoracic spine ([facility]) 10/28/2018    GERD (gastroesophageal reflux disease)     Hematuria 10/29/2018

## 2025-03-13 ENCOUNTER — ANTI-COAG VISIT (OUTPATIENT)
Age: 85
End: 2025-03-13
Payer: MEDICARE

## 2025-03-13 DIAGNOSIS — I48.0 PAROXYSMAL ATRIAL FIBRILLATION (HCC): Primary | ICD-10-CM

## 2025-03-13 DIAGNOSIS — Z79.01 LONG TERM CURRENT USE OF ANTICOAGULANT: ICD-10-CM

## 2025-03-13 LAB
POC INR: 2
PROTHROMBIN TIME, POC: NORMAL

## 2025-03-13 PROCEDURE — 85610 PROTHROMBIN TIME: CPT | Performed by: INTERNAL MEDICINE

## 2025-03-13 NOTE — PROGRESS NOTES
Therapeutic INR, patient to continue current maintenance plan (see Anticoag Dosing Calendar). INR to be rechecked in 2 week(s).

## 2025-04-02 ENCOUNTER — ANTI-COAG VISIT (OUTPATIENT)
Age: 85
End: 2025-04-02
Payer: MEDICARE

## 2025-04-02 DIAGNOSIS — I48.0 PAROXYSMAL ATRIAL FIBRILLATION (HCC): Primary | ICD-10-CM

## 2025-04-02 DIAGNOSIS — Z79.01 LONG TERM CURRENT USE OF ANTICOAGULANT: ICD-10-CM

## 2025-04-02 LAB
POC INR: 2.7
PROTHROMBIN TIME, POC: NORMAL

## 2025-04-02 PROCEDURE — 93793 ANTICOAG MGMT PT WARFARIN: CPT | Performed by: INTERNAL MEDICINE

## 2025-04-02 PROCEDURE — 85610 PROTHROMBIN TIME: CPT | Performed by: INTERNAL MEDICINE

## 2025-04-26 ENCOUNTER — HOSPITAL ENCOUNTER (EMERGENCY)
Age: 85
Discharge: HOME | End: 2025-04-26
Attending: EMERGENCY MEDICINE
Payer: MEDICARE

## 2025-04-26 ENCOUNTER — APPOINTMENT (OUTPATIENT)
Dept: GENERAL RADIOLOGY | Age: 85
End: 2025-04-26
Payer: MEDICARE

## 2025-04-26 VITALS
HEART RATE: 71 BPM | SYSTOLIC BLOOD PRESSURE: 153 MMHG | WEIGHT: 200 LBS | TEMPERATURE: 99 F | OXYGEN SATURATION: 94 % | RESPIRATION RATE: 23 BRPM | BODY MASS INDEX: 39.27 KG/M2 | DIASTOLIC BLOOD PRESSURE: 71 MMHG | HEIGHT: 60 IN

## 2025-04-26 DIAGNOSIS — I10 HYPERTENSION, UNSPECIFIED TYPE: Primary | ICD-10-CM

## 2025-04-26 LAB
ALBUMIN SERPL-MCNC: 3.5 G/DL (ref 3.2–4.6)
ALBUMIN/GLOB SERPL: 1.7 (ref 1–1.9)
ALP SERPL-CCNC: 82 U/L (ref 35–104)
ALT SERPL-CCNC: 16 U/L (ref 8–45)
ANION GAP SERPL CALC-SCNC: 8 MMOL/L (ref 7–16)
AST SERPL-CCNC: 23 U/L (ref 15–37)
BASOPHILS # BLD: 0.06 K/UL (ref 0–0.2)
BASOPHILS NFR BLD: 0.9 % (ref 0–2)
BILIRUB SERPL-MCNC: 0.4 MG/DL (ref 0–1.2)
BUN SERPL-MCNC: 12 MG/DL (ref 8–23)
CALCIUM SERPL-MCNC: 9.1 MG/DL (ref 8.8–10.2)
CHLORIDE SERPL-SCNC: 104 MMOL/L (ref 98–107)
CO2 SERPL-SCNC: 29 MMOL/L (ref 20–29)
CREAT SERPL-MCNC: 0.82 MG/DL (ref 0.6–1.1)
DIFFERENTIAL METHOD BLD: ABNORMAL
EOSINOPHIL # BLD: 0.52 K/UL (ref 0–0.8)
EOSINOPHIL NFR BLD: 7.9 % (ref 0.5–7.8)
ERYTHROCYTE [DISTWIDTH] IN BLOOD BY AUTOMATED COUNT: 16.3 % (ref 11.9–14.6)
GLOBULIN SER CALC-MCNC: 2.1 G/DL (ref 2.3–3.5)
GLUCOSE SERPL-MCNC: 118 MG/DL (ref 70–99)
HCT VFR BLD AUTO: 40 % (ref 35.8–46.3)
HGB BLD-MCNC: 12.5 G/DL (ref 11.7–15.4)
IMM GRANULOCYTES # BLD AUTO: 0.05 K/UL (ref 0–0.5)
IMM GRANULOCYTES NFR BLD AUTO: 0.8 % (ref 0–5)
LYMPHOCYTES # BLD: 1.13 K/UL (ref 0.5–4.6)
LYMPHOCYTES NFR BLD: 17.1 % (ref 13–44)
MCH RBC QN AUTO: 28.6 PG (ref 26.1–32.9)
MCHC RBC AUTO-ENTMCNC: 31.3 G/DL (ref 31.4–35)
MCV RBC AUTO: 91.5 FL (ref 82–102)
MONOCYTES # BLD: 0.75 K/UL (ref 0.1–1.3)
MONOCYTES NFR BLD: 11.4 % (ref 4–12)
NEUTS SEG # BLD: 4.09 K/UL (ref 1.7–8.2)
NEUTS SEG NFR BLD: 61.9 % (ref 43–78)
NRBC # BLD: 0 K/UL (ref 0–0.2)
PLATELET # BLD AUTO: 206 K/UL (ref 150–450)
PMV BLD AUTO: 10.1 FL (ref 9.4–12.3)
POTASSIUM SERPL-SCNC: 4.2 MMOL/L (ref 3.5–5.1)
PROT SERPL-MCNC: 5.6 G/DL (ref 6.3–8.2)
RBC # BLD AUTO: 4.37 M/UL (ref 4.05–5.2)
SODIUM SERPL-SCNC: 141 MMOL/L (ref 136–145)
TROPONIN T SERPL HS-MCNC: 12.9 NG/L (ref 0–14)
WBC # BLD AUTO: 6.6 K/UL (ref 4.3–11.1)

## 2025-04-26 PROCEDURE — 94664 DEMO&/EVAL PT USE INHALER: CPT

## 2025-04-26 PROCEDURE — 6370000000 HC RX 637 (ALT 250 FOR IP): Performed by: EMERGENCY MEDICINE

## 2025-04-26 PROCEDURE — 94640 AIRWAY INHALATION TREATMENT: CPT

## 2025-04-26 PROCEDURE — 99285 EMERGENCY DEPT VISIT HI MDM: CPT

## 2025-04-26 PROCEDURE — 84484 ASSAY OF TROPONIN QUANT: CPT

## 2025-04-26 PROCEDURE — 93005 ELECTROCARDIOGRAM TRACING: CPT | Performed by: EMERGENCY MEDICINE

## 2025-04-26 PROCEDURE — 85025 COMPLETE CBC W/AUTO DIFF WBC: CPT

## 2025-04-26 PROCEDURE — 71045 X-RAY EXAM CHEST 1 VIEW: CPT

## 2025-04-26 PROCEDURE — 80053 COMPREHEN METABOLIC PANEL: CPT

## 2025-04-26 PROCEDURE — 94760 N-INVAS EAR/PLS OXIMETRY 1: CPT

## 2025-04-26 RX ORDER — IPRATROPIUM BROMIDE AND ALBUTEROL SULFATE 2.5; .5 MG/3ML; MG/3ML
1 SOLUTION RESPIRATORY (INHALATION)
Status: COMPLETED | OUTPATIENT
Start: 2025-04-26 | End: 2025-04-26

## 2025-04-26 RX ORDER — CLONIDINE HYDROCHLORIDE 0.1 MG/1
0.1 TABLET ORAL
Status: COMPLETED | OUTPATIENT
Start: 2025-04-26 | End: 2025-04-26

## 2025-04-26 RX ADMIN — IPRATROPIUM BROMIDE AND ALBUTEROL SULFATE 1 DOSE: 2.5; .5 SOLUTION RESPIRATORY (INHALATION) at 18:04

## 2025-04-26 RX ADMIN — CLONIDINE HYDROCHLORIDE 0.1 MG: 0.1 TABLET ORAL at 18:55

## 2025-04-26 ASSESSMENT — LIFESTYLE VARIABLES
HOW OFTEN DO YOU HAVE A DRINK CONTAINING ALCOHOL: 2-4 TIMES A MONTH
HOW MANY STANDARD DRINKS CONTAINING ALCOHOL DO YOU HAVE ON A TYPICAL DAY: 1 OR 2

## 2025-04-26 ASSESSMENT — PAIN SCALES - GENERAL: PAINLEVEL_OUTOF10: 1

## 2025-04-26 ASSESSMENT — PAIN - FUNCTIONAL ASSESSMENT: PAIN_FUNCTIONAL_ASSESSMENT: 0-10

## 2025-04-26 NOTE — ED PROVIDER NOTES
Emergency Department Provider Note       [facility] EMERGENCY DEPT   PCP: Reyes, Rayma Y, APRN - CNP   Age: 84 y.o.   Sex: female     DISPOSITION Decision To Discharge 04/26/2025 07:08:17 PM    ICD-10-CM    1. Hypertension, unspecified type  I10           Medical Decision Making     Patient is being evaluated for hypertension.  On arrival blood pressure is improved from what she describes prior to arrival.  She is 157/99 where she was greater than 200 at home.  She also states that she is \"feeling much better now\".  She was concerned about the possibility of heart attack and with her comorbidities cardiac workup will be performed.  She had some wheezing on exam has a history of asthma so was ordered a DuoNeb breathing treatment.  Patient herself had no complaint of shortness of breath.  ED Course as of 04/26/25 1909   Sat Apr 26, 2025 1815 Troponin T: 12.9  Patient's troponin is normal.  She has been asymptomatic since blood pressure normalized prior to arrival.  She is feeling much better.  She feels comfortable discharge.  She will follow-up with her doctor on Monday and return to the ER if symptoms worsen. [ERIC]   1909 XR CHEST PORTABLE  Per my independent interpretation x-ray of the chest is negative for any acute intrathoracic process. [ERIC]      ED Course User Index  [ERIC] Larisa Galvez,      1 or more acute illnesses that pose a threat to life or bodily function.   Patient was discharged risks and benefits of hospitalization were considered.  Shared medical decision making was utilized in creating the patients health plan today.  I independently ordered and reviewed each unique test.         ED cardiac monitoring rhythm strip was ordered and interpreted:  sinus rhythm, no evidence of an arrhythmia  ST Segments:Nonspecific ST segments - NO STEMI   Rate: 68                History     Patient is an 84-year-old female presenting with complaint of general illness and hypertension.  According the patient for  4.37 4.05 - 5.2 M/uL    Hemoglobin 12.5 11.7 - 15.4 g/dL    Hematocrit 40.0 35.8 - 46.3 %    MCV 91.5 82.0 - 102.0 FL    MCH 28.6 26.1 - 32.9 PG    MCHC 31.3 (L) 31.4 - 35.0 g/dL    RDW 16.3 (H) 11.9 - 14.6 %    Platelets 206 150 - 450 K/uL    MPV 10.1 9.4 - 12.3 FL    nRBC 0.00 0.0 - 0.2 K/uL    Differential Type AUTOMATED      Neutrophils % 61.9 43.0 - 78.0 %    Lymphocytes % 17.1 13.0 - 44.0 %    Monocytes % 11.4 4.0 - 12.0 %    Eosinophils % 7.9 (H) 0.5 - 7.8 %    Basophils % 0.9 0.0 - 2.0 %    Immature Granulocytes % 0.8 0.0 - 5.0 %    Neutrophils Absolute 4.09 1.70 - 8.20 K/UL    Lymphocytes Absolute 1.13 0.50 - 4.60 K/UL    Monocytes Absolute 0.75 0.10 - 1.30 K/UL    Eosinophils Absolute 0.52 0.00 - 0.80 K/UL    Basophils Absolute 0.06 0.00 - 0.20 K/UL    Immature Granulocytes Absolute 0.05 0.0 - 0.5 K/UL   CMP   Result Value Ref Range    Sodium 141 136 - 145 mmol/L    Potassium 4.2 3.5 - 5.1 mmol/L    Chloride 104 98 - 107 mmol/L    CO2 29 20 - 29 mmol/L    Anion Gap 8 7 - 16 mmol/L    Glucose 118 (H) 70 - 99 mg/dL    BUN 12 8 - 23 MG/DL    Creatinine 0.82 0.60 - 1.10 MG/DL    Est, Glom Filt Rate 71 >60 ml/min/1.73m2    Calcium 9.1 8.8 - 10.2 MG/DL    Total Bilirubin 0.4 0.0 - 1.2 MG/DL    ALT 16 8 - 45 U/L    AST 23 15 - 37 U/L    Alk Phosphatase 82 35 - 104 U/L    Total Protein 5.6 (L) 6.3 - 8.2 g/dL    Albumin 3.5 3.2 - 4.6 g/dL    Globulin 2.1 (L) 2.3 - 3.5 g/dL    Albumin/Globulin Ratio 1.7 1.0 - 1.9     Troponin   Result Value Ref Range    Troponin T 12.9 0 - 14 ng/L   EKG 12 Lead (Chest Pain)   Result Value Ref Range    Ventricular Rate 68 BPM    Atrial Rate 68 BPM    QRS Duration 99 ms    Q-T Interval 413 ms    QTc Calculation (Bazett) 440 ms    R Axis -9 degrees    T Axis 70 degrees    Diagnosis       Atrial flutter with predominant 3:1 AV block  Borderline low voltage, extremity leads           XR CHEST PORTABLE    (Results Pending)                No results for input(s): \"COVID19\" in the last 72

## 2025-04-26 NOTE — ED TRIAGE NOTES
Patient arrives via [facility] from home with CO HTN SBP >200 on home monitor /106 with EMS. Reports generalized weakness x 2-3 days as well as palpitations.

## 2025-04-27 LAB
EKG ATRIAL RATE: 68 BPM
EKG DIAGNOSIS: NORMAL
EKG Q-T INTERVAL: 413 MS
EKG QRS DURATION: 99 MS
EKG QTC CALCULATION (BAZETT): 440 MS
EKG R AXIS: -9 DEGREES
EKG T AXIS: 70 DEGREES
EKG VENTRICULAR RATE: 68 BPM

## 2025-04-27 PROCEDURE — 93010 ELECTROCARDIOGRAM REPORT: CPT | Performed by: INTERNAL MEDICINE

## 2025-04-29 ENCOUNTER — TELEPHONE (OUTPATIENT)
Age: 85
End: 2025-04-29

## 2025-04-29 NOTE — TELEPHONE ENCOUNTER
Pt recently seen in ER for elevated BP  No changes made. Pt advised to see cardiology.  C/o  squeezing in chest waking her in the middle of the night x 4 nights.  No symptoms presently. Requesting f/u appt.    Triage scheduled next available appt with Dr. Lopez 4/30/25 at 2:00 at [place] office. Pt will see coumadin clinic at 1:30. Pt confirms both appts. Checked BP and SBP was 167. She does not remember DBP.

## 2025-04-29 NOTE — TELEPHONE ENCOUNTER
Patient called stating she has the following issues :    YY=525/100+  Intermittent/erratic  Onset 2 nights ago  ER visit  Chest discomfort  Intermittent fullness/tightness  Squeezing around rib cage  Denies CP

## 2025-04-30 ENCOUNTER — ANTI-COAG VISIT (OUTPATIENT)
Age: 85
End: 2025-04-30
Payer: MEDICARE

## 2025-04-30 ENCOUNTER — OFFICE VISIT (OUTPATIENT)
Age: 85
End: 2025-04-30
Payer: MEDICARE

## 2025-04-30 VITALS
HEART RATE: 62 BPM | HEIGHT: 60 IN | BODY MASS INDEX: 39.27 KG/M2 | WEIGHT: 200 LBS | DIASTOLIC BLOOD PRESSURE: 74 MMHG | SYSTOLIC BLOOD PRESSURE: 138 MMHG | OXYGEN SATURATION: 90 %

## 2025-04-30 DIAGNOSIS — I08.0 MITRAL VALVE STENOSIS AND AORTIC VALVE STENOSIS: Primary | Chronic | ICD-10-CM

## 2025-04-30 DIAGNOSIS — R07.89 OTHER CHEST PAIN: ICD-10-CM

## 2025-04-30 DIAGNOSIS — I48.0 PAROXYSMAL ATRIAL FIBRILLATION (HCC): Primary | ICD-10-CM

## 2025-04-30 DIAGNOSIS — Z79.01 LONG TERM CURRENT USE OF ANTICOAGULANT: ICD-10-CM

## 2025-04-30 DIAGNOSIS — I48.0 PAROXYSMAL ATRIAL FIBRILLATION (HCC): Chronic | ICD-10-CM

## 2025-04-30 DIAGNOSIS — I34.2 NONRHEUMATIC MITRAL VALVE STENOSIS: Chronic | ICD-10-CM

## 2025-04-30 DIAGNOSIS — I10 PRIMARY HYPERTENSION: Chronic | ICD-10-CM

## 2025-04-30 LAB
POC INR: 2.2
PROTHROMBIN TIME, POC: NORMAL

## 2025-04-30 PROCEDURE — G8400 PT W/DXA NO RESULTS DOC: HCPCS | Performed by: INTERNAL MEDICINE

## 2025-04-30 PROCEDURE — 3078F DIAST BP <80 MM HG: CPT | Performed by: INTERNAL MEDICINE

## 2025-04-30 PROCEDURE — 3075F SYST BP GE 130 - 139MM HG: CPT | Performed by: INTERNAL MEDICINE

## 2025-04-30 PROCEDURE — G8427 DOCREV CUR MEDS BY ELIG CLIN: HCPCS | Performed by: INTERNAL MEDICINE

## 2025-04-30 PROCEDURE — 1123F ACP DISCUSS/DSCN MKR DOCD: CPT | Performed by: INTERNAL MEDICINE

## 2025-04-30 PROCEDURE — 85610 PROTHROMBIN TIME: CPT | Performed by: INTERNAL MEDICINE

## 2025-04-30 PROCEDURE — 1159F MED LIST DOCD IN RCRD: CPT | Performed by: INTERNAL MEDICINE

## 2025-04-30 PROCEDURE — 1160F RVW MEDS BY RX/DR IN RCRD: CPT | Performed by: INTERNAL MEDICINE

## 2025-04-30 PROCEDURE — 1036F TOBACCO NON-USER: CPT | Performed by: INTERNAL MEDICINE

## 2025-04-30 PROCEDURE — G8417 CALC BMI ABV UP PARAM F/U: HCPCS | Performed by: INTERNAL MEDICINE

## 2025-04-30 PROCEDURE — 1126F AMNT PAIN NOTED NONE PRSNT: CPT | Performed by: INTERNAL MEDICINE

## 2025-04-30 PROCEDURE — 1090F PRES/ABSN URINE INCON ASSESS: CPT | Performed by: INTERNAL MEDICINE

## 2025-04-30 PROCEDURE — 99214 OFFICE O/P EST MOD 30 MIN: CPT | Performed by: INTERNAL MEDICINE

## 2025-04-30 RX ORDER — DAPAGLIFLOZIN 10 MG/1
10 TABLET, FILM COATED ORAL EVERY MORNING
Qty: 90 TABLET | Refills: 3 | Status: SHIPPED | OUTPATIENT
Start: 2025-04-30

## 2025-04-30 RX ORDER — METOPROLOL SUCCINATE 25 MG/1
25 TABLET, EXTENDED RELEASE ORAL DAILY
Qty: 90 TABLET | Refills: 3 | Status: SHIPPED | OUTPATIENT
Start: 2025-04-30

## 2025-04-30 RX ORDER — SPIRONOLACTONE 25 MG/1
25 TABLET ORAL DAILY
Qty: 90 TABLET | Refills: 3 | Status: SHIPPED | OUTPATIENT
Start: 2025-04-30

## 2025-04-30 ASSESSMENT — ENCOUNTER SYMPTOMS: SHORTNESS OF BREATH: 1

## 2025-04-30 NOTE — PROGRESS NOTES
[facility] CARDIOLOGY  [address] [place] [address], [address]  [place] [address]  PHONE: [phone]      25    NAME:  Jeanne Nieto  : 1940  MRN: [id number]         SUBJECTIVE:   Jeanne Nieto is a 84 y.o. female seen for a follow up visit regarding the following:     Chief Complaint   Patient presents with    Hypertension    Chest Pain            HPI:  Follow up  Hypertension and Chest Pain   .      84 y.o. female with PMH HTN, HLD, obesity, paroxysmal atrial fibrillation, mitral stenosis, aortic stenosis, asthma presenting for routine follow up. Patient reports SBP up to the 190s recently. She reports some associated discomfort in her chest. Recently she has been more shortness of breath than normal which she attributes to her asthma. No other acute complaints.        Cardiac Medications       Potassium Sparing Diuretics       spironolactone (ALDACTONE) 25 MG tablet Take 1 tablet by mouth daily       Beta Blockers Cardio-Selective       metoprolol succinate (TOPROL XL) 25 MG extended release tablet Take 1 tablet by mouth daily       Loop Diuretics       furosemide (LASIX) 40 MG tablet Take 1 tablet by mouth daily as needed (shortness of breath or swelling)       HMG CoA Reductase Inhibitors       atorvastatin (LIPITOR) 40 MG tablet Take 1 tablet by mouth daily     Patient not taking: Reported on 2025       Coumarin Anticoagulants       warfarin (COUMADIN) 5 MG tablet Take 1 tablet by mouth daily          Diabetic Medications       Incretin Mimetic Agents       dulaglutide (TRULICITY) 0.75 MG/0.5ML SOAJ SC injection Inject 0.5 mLs into the skin once a week     Patient not taking: Reported on 2025     MOUNJARO 2.5 MG/0.5ML SOAJ Inject 2.5 mg into the skin every 7 days     Patient not taking: Reported on 2025       Sodium-Glucose Co-Transporter 2 (SGLT2) Inhibitors       dapagliflozin (FARXIGA) 10 MG tablet Take 1 tablet by mouth every morning                Past Medical History, Past

## 2025-05-01 ENCOUNTER — TELEPHONE (OUTPATIENT)
Dept: PULMONOLOGY | Age: 85
End: 2025-05-01

## 2025-05-01 RX ORDER — LEVALBUTEROL INHALATION SOLUTION 0.63 MG/3ML
0.63 SOLUTION RESPIRATORY (INHALATION)
Qty: 1 EACH | Refills: 0 | Status: SHIPPED | OUTPATIENT
Start: 2025-05-01 | End: 2025-06-06 | Stop reason: SDUPTHER

## 2025-05-01 RX ORDER — PREDNISONE 10 MG/1
TABLET ORAL
Qty: 30 TABLET | Refills: 0 | Status: SHIPPED | OUTPATIENT
Start: 2025-05-01 | End: 2025-06-18

## 2025-05-01 NOTE — TELEPHONE ENCOUNTER
Patient is having asthma flare up . Needs refills for           levalbuterol (XOPENEX) 0.63 MG/3ML nebulization       predniSONE (DELTASONE) 10 MG tablet     Pharmacy    [id number] - [place], [place] - [address] - P [phone] - F [phone] [address] [place] [address]

## 2025-05-12 RX ORDER — LEVALBUTEROL INHALATION SOLUTION 0.63 MG/3ML
SOLUTION RESPIRATORY (INHALATION)
Qty: 75 ML | Refills: 0 | OUTPATIENT
Start: 2025-05-12

## 2025-05-12 RX ORDER — PREDNISONE 10 MG/1
TABLET ORAL
Qty: 30 TABLET | Refills: 0 | OUTPATIENT
Start: 2025-05-12

## 2025-05-19 DIAGNOSIS — G25.81 RLS (RESTLESS LEGS SYNDROME): ICD-10-CM

## 2025-05-20 ENCOUNTER — TELEPHONE (OUTPATIENT)
Dept: PRIMARY CARE CLINIC | Facility: CLINIC | Age: 85
End: 2025-05-20

## 2025-05-20 DIAGNOSIS — G25.81 RLS (RESTLESS LEGS SYNDROME): ICD-10-CM

## 2025-05-20 RX ORDER — GABAPENTIN 100 MG/1
200 CAPSULE ORAL NIGHTLY
Qty: 180 CAPSULE | Refills: 0 | Status: SHIPPED | OUTPATIENT
Start: 2025-05-20 | End: 2025-08-18

## 2025-05-20 RX ORDER — GABAPENTIN 300 MG/1
CAPSULE ORAL
OUTPATIENT
Start: 2025-05-20

## 2025-05-20 NOTE — TELEPHONE ENCOUNTER
ADDENDUM:  This is not a refill request.  They need clarification on directions. I will forward to the provider.

## 2025-05-28 ENCOUNTER — ANTI-COAG VISIT (OUTPATIENT)
Age: 85
End: 2025-05-28
Payer: MEDICARE

## 2025-05-28 DIAGNOSIS — Z79.01 LONG TERM CURRENT USE OF ANTICOAGULANT: ICD-10-CM

## 2025-05-28 DIAGNOSIS — I48.0 PAROXYSMAL ATRIAL FIBRILLATION (HCC): Primary | ICD-10-CM

## 2025-05-28 LAB
POC INR: 1.8
PROTHROMBIN TIME, POC: ABNORMAL

## 2025-05-28 PROCEDURE — 93793 ANTICOAG MGMT PT WARFARIN: CPT | Performed by: INTERNAL MEDICINE

## 2025-05-28 PROCEDURE — 85610 PROTHROMBIN TIME: CPT | Performed by: INTERNAL MEDICINE

## 2025-05-28 NOTE — PATIENT INSTRUCTIONS
Reminder: Please contact the Coumadin Clinic at [phone] when you have medication changes. Examples, new medications, antibiotics, discontinued medications, new supplements, missed doses of warfarin or if you took extra doses of warfarin.  This also includes OTC medications. Notifying us helps reduce the possibility of high and low INR's. In addition, if warfarin needs to be held for any procedures, please have surgeon or physician's office contact us before holding anticoagulant. Thanks, [facility] Cardiology Coumadin Clinic.         Please go to the Emergency Department if you experience:  - Extreme shortness of breath or chest pain  - Red, warm, painful, swollen limb  - Numbness or tingling on one side of the body  - Slurred speech or major vision change  - Extreme headache

## 2025-05-28 NOTE — PROGRESS NOTES
Warfarin tablet strength and weekly dosing schedule confirmed today.    Pt has been eating more salads this week.     One time dose of 7.5 mg tonight instead of 5 mg. Then continue current maintenance plan (see Anticoag Dosing Calendar). INR to be rechecked in 2 week(s). [place](s).     Pt request to come back in 1 month.

## 2025-06-06 ENCOUNTER — TELEPHONE (OUTPATIENT)
Dept: PULMONOLOGY | Age: 85
End: 2025-06-06

## 2025-06-06 RX ORDER — AZITHROMYCIN 250 MG/1
TABLET, FILM COATED ORAL
Qty: 6 TABLET | Refills: 0 | Status: SHIPPED | OUTPATIENT
Start: 2025-06-06 | End: 2025-06-16

## 2025-06-06 RX ORDER — LEVALBUTEROL INHALATION SOLUTION 0.63 MG/3ML
SOLUTION RESPIRATORY (INHALATION)
Qty: 75 ML | Refills: 0 | OUTPATIENT
Start: 2025-06-06

## 2025-06-06 RX ORDER — PREDNISONE 20 MG/1
20 TABLET ORAL DAILY
Qty: 15 TABLET | Refills: 0 | Status: SHIPPED | OUTPATIENT
Start: 2025-06-06

## 2025-06-06 RX ORDER — LEVALBUTEROL INHALATION SOLUTION 0.63 MG/3ML
0.63 SOLUTION RESPIRATORY (INHALATION)
Qty: 120 EACH | Refills: 11 | Status: SHIPPED | OUTPATIENT
Start: 2025-06-06

## 2025-06-06 NOTE — TELEPHONE ENCOUNTER
Pharmacy is [facility] at [place]  She is having asthma flare and needs Prednisone and levabuterol for nebulizer.     TRIAGE CALL      Complaint: asthma flare   Cough: yes  Productive:  yes  Bloody Sputum:  yes - tinge - mostly green  Increased SOB/Wheezing:  yes  Duration: 2-3 weeks  Fever/Chills: no  OTC Meds tried: not taken OTC meds.     Please call her [phone]

## 2025-06-06 NOTE — TELEPHONE ENCOUNTER
Last seen: 3/7/25  Hx: severe asthma, allergic rhinitis, GERD    Sick call 5/1/25 & xopenex neb refilled & sent steroid taper.     Patient call reporting asthma flare w/ productive cough w/ green sputum, notes ongoing for 2-3 weeks, no fever/chills.   Asking for xopenex & prednisone.  Contacted patient to review symptoms, acknowledges symptoms were better during taper last month.   States she has had to use prednisone a lot over the years, states normally needs to start on 120mg prednisone    Sats range 84-95%, able to use O2 @ 3.5NC to manage sob w/ exertion, audible wheezing during conversation.   Chart research shows last 2 tapers started at 40mg.   Please advise if steroid taper can be sent in.

## 2025-06-06 NOTE — TELEPHONE ENCOUNTER
Can refill a prednisone taper but if no better the patient needs to be either seen in the office or go to the ER especially if she is worse over the weekend I would also prescribe a Z-Kg since she has green discolored sputum.    Ronny Haywood MD.

## 2025-06-18 RX ORDER — PREDNISONE 10 MG/1
TABLET ORAL
Qty: 30 TABLET | Refills: 0 | Status: SHIPPED | OUTPATIENT
Start: 2025-06-18

## 2025-06-25 ENCOUNTER — ANTI-COAG VISIT (OUTPATIENT)
Age: 85
End: 2025-06-25
Payer: MEDICARE

## 2025-06-25 DIAGNOSIS — Z79.01 LONG TERM CURRENT USE OF ANTICOAGULANT: ICD-10-CM

## 2025-06-25 DIAGNOSIS — I48.0 PAROXYSMAL ATRIAL FIBRILLATION (HCC): Primary | ICD-10-CM

## 2025-06-25 LAB
POC INR: 3.5
PROTHROMBIN TIME, POC: ABNORMAL

## 2025-06-25 PROCEDURE — 85610 PROTHROMBIN TIME: CPT | Performed by: INTERNAL MEDICINE

## 2025-06-25 PROCEDURE — 93793 ANTICOAG MGMT PT WARFARIN: CPT | Performed by: INTERNAL MEDICINE

## 2025-06-25 NOTE — PATIENT INSTRUCTIONS
Reminder: Please contact the Coumadin Clinic at [phone] when you have medication changes. Examples, new medications, antibiotics, discontinued medications, new supplements, missed doses of warfarin or if you took extra doses of warfarin.  This also includes OTC medications. Notifying us helps reduce the possibility of high and low INR's. In addition, if warfarin needs to be held for any procedures, please have surgeon or physician's office contact us before holding anticoagulant. Thanks, [facility] Cardiology Coumadin Clinic.       Please go to the Emergency Department if you experience:  - Extreme shortness of breath or chest pain  - Red, warm, painful, swollen limb  - Numbness or tingling on one side of the body  - Slurred speech or major vision change  - Extreme headache

## 2025-06-25 NOTE — PROGRESS NOTES
Warfarin tablet strength and weekly dosing schedule confirmed today. Taking Tylenol 3 times a day Therapeutic INR, patient to continue current maintenance plan (see Anticoag Dosing Calendar). INR to be rechecked in two week(s).

## 2025-07-08 ENCOUNTER — ANTI-COAG VISIT (OUTPATIENT)
Age: 85
End: 2025-07-08
Payer: MEDICARE

## 2025-07-08 DIAGNOSIS — I48.0 PAROXYSMAL ATRIAL FIBRILLATION (HCC): Primary | ICD-10-CM

## 2025-07-08 DIAGNOSIS — Z79.01 LONG TERM CURRENT USE OF ANTICOAGULANT: ICD-10-CM

## 2025-07-08 LAB
POC INR: 2.4
PROTHROMBIN TIME, POC: NORMAL

## 2025-07-08 PROCEDURE — 85610 PROTHROMBIN TIME: CPT | Performed by: INTERNAL MEDICINE

## 2025-07-08 PROCEDURE — 93793 ANTICOAG MGMT PT WARFARIN: CPT | Performed by: INTERNAL MEDICINE

## 2025-07-29 ENCOUNTER — OFFICE VISIT (OUTPATIENT)
Dept: PULMONOLOGY | Age: 85
End: 2025-07-29
Payer: MEDICARE

## 2025-07-29 VITALS
WEIGHT: 201 LBS | DIASTOLIC BLOOD PRESSURE: 75 MMHG | OXYGEN SATURATION: 91 % | HEIGHT: 60 IN | BODY MASS INDEX: 39.46 KG/M2 | HEART RATE: 98 BPM | TEMPERATURE: 98.2 F | SYSTOLIC BLOOD PRESSURE: 152 MMHG

## 2025-07-29 DIAGNOSIS — J45.51 SEVERE PERSISTENT ASTHMA WITH ACUTE EXACERBATION (HCC): Primary | ICD-10-CM

## 2025-07-29 DIAGNOSIS — K21.9 GASTROESOPHAGEAL REFLUX DISEASE, UNSPECIFIED WHETHER ESOPHAGITIS PRESENT: ICD-10-CM

## 2025-07-29 DIAGNOSIS — J96.22 ACUTE AND CHRONIC RESPIRATORY FAILURE WITH HYPERCAPNIA (HCC): ICD-10-CM

## 2025-07-29 DIAGNOSIS — E66.01 SEVERE OBESITY (BMI 35.0-39.9) WITH COMORBIDITY (HCC): ICD-10-CM

## 2025-07-29 DIAGNOSIS — G47.34 NOCTURNAL HYPOXIA: ICD-10-CM

## 2025-07-29 PROCEDURE — 1159F MED LIST DOCD IN RCRD: CPT | Performed by: NURSE PRACTITIONER

## 2025-07-29 PROCEDURE — 94664 DEMO&/EVAL PT USE INHALER: CPT | Performed by: NURSE PRACTITIONER

## 2025-07-29 PROCEDURE — G8417 CALC BMI ABV UP PARAM F/U: HCPCS | Performed by: NURSE PRACTITIONER

## 2025-07-29 PROCEDURE — G8427 DOCREV CUR MEDS BY ELIG CLIN: HCPCS | Performed by: NURSE PRACTITIONER

## 2025-07-29 PROCEDURE — 3078F DIAST BP <80 MM HG: CPT | Performed by: NURSE PRACTITIONER

## 2025-07-29 PROCEDURE — 3077F SYST BP >= 140 MM HG: CPT | Performed by: NURSE PRACTITIONER

## 2025-07-29 PROCEDURE — 99215 OFFICE O/P EST HI 40 MIN: CPT | Performed by: NURSE PRACTITIONER

## 2025-07-29 PROCEDURE — 1090F PRES/ABSN URINE INCON ASSESS: CPT | Performed by: NURSE PRACTITIONER

## 2025-07-29 PROCEDURE — G8400 PT W/DXA NO RESULTS DOC: HCPCS | Performed by: NURSE PRACTITIONER

## 2025-07-29 PROCEDURE — 1126F AMNT PAIN NOTED NONE PRSNT: CPT | Performed by: NURSE PRACTITIONER

## 2025-07-29 PROCEDURE — 1123F ACP DISCUSS/DSCN MKR DOCD: CPT | Performed by: NURSE PRACTITIONER

## 2025-07-29 PROCEDURE — 96372 THER/PROPH/DIAG INJ SC/IM: CPT | Performed by: NURSE PRACTITIONER

## 2025-07-29 PROCEDURE — 1036F TOBACCO NON-USER: CPT | Performed by: NURSE PRACTITIONER

## 2025-07-29 RX ORDER — AZITHROMYCIN 250 MG/1
250 TABLET, FILM COATED ORAL
Qty: 15 TABLET | Refills: 11 | Status: SHIPPED | OUTPATIENT
Start: 2025-07-30

## 2025-07-29 RX ORDER — SODIUM CHLORIDE FOR INHALATION 3 %
4 VIAL, NEBULIZER (ML) INHALATION 2 TIMES DAILY
Qty: 120 ML | Refills: 11 | Status: SHIPPED | OUTPATIENT
Start: 2025-07-29

## 2025-07-29 RX ORDER — LEVALBUTEROL INHALATION SOLUTION 1.25 MG/3ML
1.25 SOLUTION RESPIRATORY (INHALATION) EVERY 6 HOURS PRN
Qty: 360 ML | Refills: 11 | Status: SHIPPED | OUTPATIENT
Start: 2025-07-29

## 2025-07-29 RX ORDER — PREDNISONE 10 MG/1
TABLET ORAL
Qty: 30 TABLET | Refills: 1 | Status: SHIPPED | OUTPATIENT
Start: 2025-07-29

## 2025-07-29 RX ORDER — AZITHROMYCIN 250 MG/1
TABLET, FILM COATED ORAL
Qty: 6 TABLET | Refills: 0 | Status: SHIPPED | OUTPATIENT
Start: 2025-07-29 | End: 2025-08-08

## 2025-07-29 RX ORDER — DEXAMETHASONE SODIUM PHOSPHATE 10 MG/ML
10 INJECTION, SOLUTION INTRA-ARTICULAR; INTRALESIONAL; INTRAMUSCULAR; INTRAVENOUS; SOFT TISSUE ONCE
Status: COMPLETED | OUTPATIENT
Start: 2025-07-29 | End: 2025-07-29

## 2025-07-29 RX ADMIN — DEXAMETHASONE SODIUM PHOSPHATE 10 MG: 10 INJECTION, SOLUTION INTRA-ARTICULAR; INTRALESIONAL; INTRAMUSCULAR; INTRAVENOUS; SOFT TISSUE at 11:08

## 2025-07-29 NOTE — PROGRESS NOTES
Name:  Jeanne Nieto  YOB: 1940   MRN: [id number]      Office Visit: 7/29/2025        ASSESSMENT AND PLAN:  (Medical Decision Making)    Impression: 84 y.o. female recently hospitalized for an asthma exacerbation and here for follow-up    1. Severe persistent asthma with exacerbation  --exacerbated again today.  #3 in 3 months.  Dexamethasone 10 mg IM given along with DuoNeb treatment here in the office.  Unsure if just not resolving exacerbation, or continuing to flare. Extend therapy with spiriva, sample given  To let me know if this works  Patient is given verbal instructions on proper use of prescribed inhaler and is able to give adequate return demonstration.  Inhaler education was indicated due to new therapy.   --Another prednisone taper.    --xopenex and 3% saline BID for congestion.    --azithromycin pack and then will start MWF.  Cost has been a huge issue with a lot of her medications so azithromycin is the cheaper version of trying to maintain her asthma without a biologic  --if more issues with flare, will price biologic with her insurance.  Abs eos up to 0.9.    --She does have a history of sleep apnea intolerant of CPAP. She is adamant she will not wear CPAP.  This could be contributing to her recurrent flares with her asthma   --continue O2 at night at 2L  -- Needed 2 L with exertion today in the office and therefore we will also get her a POC    2. Allergic rhinitis, unspecified seasonality, unspecified trigger  --continue Singulair    3. 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 Gastroesophageal reflux disease, unspecified whether esophagitis present  --Large hiatal hernia.  Continue omeprazole in the hospital and noticed improvement, so we will get her back on this    5. Acute on chronic respiratory failure with hypoxia  -- Known nocturnal hypoxia, but now having issues with hypoxia on exertion.  Will order PFT.  She needed 2 L nasal cannula with exertion in office today    Orders Placed

## 2025-07-29 NOTE — PATIENT INSTRUCTIONS
Start prednisone taper again-follow directions  Start zpack for 5 days, then start 1 tab on Monday, Wednesday, Friday.  Start nebulizer treatments twice daily with levalbuterol and saline solution (recipe below) Morning and evening.  Cough, clear lungs and throat, then do advair.    Start Spiriva 2 puffs daily.      Add morning allergy medication-allegra,xyzal are options.  These are over the counter.    Home Made 3% Saline for Nebulizer:    Homemade saline solution     Ingredients:  4 cups of distilled or boiled (for at least 20 minutes) water  6 teaspoons (tsp) of noniodized salt  an airtight storage container with a lid, such as a bottle  a mixing utensil  To make a smaller batch, use 1 cup of water with 1 & 1/2 tsp of salt.    If using tap water, boil it first for at least 20 minutes to sterilize the water and remove any bacteria and chemicals. Let it cool before use. Avoid using sea salt, as it contains additional minerals.    Instructions:  Wash the hands thoroughly  sterilize the container and mixing utensil by using a  or boiling them in water  pour the water into the container  mix in the salt and stir until completely dissolved  let the mixture cool before use  Store the saline solution in the airtight container. Research suggests that bacteria can grow in homemade saline solution within 24 hours, and that bacteria are less likely to grow when saline is chilled. Where possible, store the solution in the refrigerator.   Use 4mL in nebulizer

## 2025-08-05 ENCOUNTER — ANTI-COAG VISIT (OUTPATIENT)
Age: 85
End: 2025-08-05
Payer: MEDICARE

## 2025-08-05 DIAGNOSIS — I48.0 PAROXYSMAL ATRIAL FIBRILLATION (HCC): Primary | ICD-10-CM

## 2025-08-05 DIAGNOSIS — Z79.01 LONG TERM CURRENT USE OF ANTICOAGULANT: ICD-10-CM

## 2025-08-05 LAB
POC INR: 2.4
PROTHROMBIN TIME, POC: NORMAL

## 2025-08-05 PROCEDURE — 93793 ANTICOAG MGMT PT WARFARIN: CPT | Performed by: INTERNAL MEDICINE

## 2025-08-05 PROCEDURE — 85610 PROTHROMBIN TIME: CPT | Performed by: INTERNAL MEDICINE

## 2025-08-27 ENCOUNTER — TELEPHONE (OUTPATIENT)
Age: 85
End: 2025-08-27

## 2025-09-04 ENCOUNTER — ANTI-COAG VISIT (OUTPATIENT)
Age: 85
End: 2025-09-04
Payer: MEDICARE

## 2025-09-04 DIAGNOSIS — I48.0 PAROXYSMAL ATRIAL FIBRILLATION (HCC): Primary | ICD-10-CM

## 2025-09-04 DIAGNOSIS — Z79.01 LONG TERM CURRENT USE OF ANTICOAGULANT: ICD-10-CM

## 2025-09-04 LAB
POC INR: 2.7
PROTHROMBIN TIME, POC: NORMAL

## 2025-09-04 PROCEDURE — 85610 PROTHROMBIN TIME: CPT | Performed by: INTERNAL MEDICINE
